# Patient Record
Sex: MALE | Race: WHITE | HISPANIC OR LATINO | Employment: OTHER | URBAN - METROPOLITAN AREA
[De-identification: names, ages, dates, MRNs, and addresses within clinical notes are randomized per-mention and may not be internally consistent; named-entity substitution may affect disease eponyms.]

---

## 2017-03-29 ENCOUNTER — CLINICAL SUPPORT (OUTPATIENT)
Dept: REHABILITATION | Facility: HOSPITAL | Age: 62
End: 2017-03-29
Attending: ORTHOPAEDIC SURGERY
Payer: MEDICARE

## 2017-03-29 DIAGNOSIS — R29.898 DECREASED ROM OF NECK: ICD-10-CM

## 2017-03-29 DIAGNOSIS — M62.89 QUADRICEP TIGHTNESS: ICD-10-CM

## 2017-03-29 DIAGNOSIS — M25.69 DECREASED ROM OF TRUNK AND BACK: ICD-10-CM

## 2017-03-29 DIAGNOSIS — M62.9 HAMSTRING TIGHTNESS OF BOTH LOWER EXTREMITIES: ICD-10-CM

## 2017-03-29 PROCEDURE — 97110 THERAPEUTIC EXERCISES: CPT | Mod: PN

## 2017-03-29 PROCEDURE — 97161 PT EVAL LOW COMPLEX 20 MIN: CPT | Mod: PN

## 2017-03-29 PROCEDURE — G8982 BODY POS GOAL STATUS: HCPCS | Mod: CJ,PN

## 2017-03-29 PROCEDURE — G8981 BODY POS CURRENT STATUS: HCPCS | Mod: CK,PN

## 2017-03-29 NOTE — PLAN OF CARE
TIME RECORD    Date: 03/29/2017    Start Time:  10:00  Stop Time:  10:52    PROCEDURES:    TIMED  Procedure Min.   TE 8                     UNTIMED  Procedure Min.   EVL 43         Total Timed Minutes:  8  Total Timed Units:  1  Total Untimed Units:  1  Charges Billed/# of units:  EVL-1, TE-1    OUTPATIENT PHYSICAL THERAPY   PATIENT EVALUATION  Onset Date: chronic  Primary Diagnosis:   1. Decreased ROM of neck     2. Decreased ROM of trunk and back     3. Quadricep tightness     4. Hamstring tightness of both lower extremities       Treatment Diagnosis: cervical and lumbar pain, B shoulder impingement  No past medical history on file. No Pmhx to report.  Precautions: standard  Prior Therapy: unsure.  Medications: Medhat Nunez has a current medication list which includes the following prescription(s): multivitamin.  History of Present Illness: cervical, lumbar, and shoulder pain.  Prior Level of Function: Independent  Social History: Lives in [facility]. Not employed.  Place of Residence (Steps/Adaptations): 6 steps to enter  Functional Deficits Leading to Referral/Nature of Injury: pain  Patient Therapy Goals: exercise, decrease pain    Subjective     Medhat Nunez states cervical fusion 2013 and R RCR  2014 (no lumbar sx). States pain in constant in B shoulders, cervical, and lumbar spine. Lumbar pain began 6 months ago, and cervical pain since 2013. States he has been exercising all this life, but now he is unable. Pain not dependent on time of day and denies N/T B UE/LE.     Pain:  Location: cervical <> lumbar spine, B shoulders  (B UT)  Description: constant  Activities Which Increase Pain: ambulation, sit <> stand, turning c driving  Activities Which Decrease Pain: ice, pain meds  Pain Scale: 3/10 at best 8/10 now  9/10 at worst    Objective     Posture: B rounded shoulders, R shoulder elevated  Palpation: TTP c CPA's T10-L4/5; TTP R thoracic and lumbar paraspinals, B QL c increased myofascial tone throughout.    Sensation: SILT B UE/LE  DTRs: NT  Range of Motion/Strength:     AROM: CERVICAL LUMBAR   Flexion 40 70 c pain   Extension 20 c pain 15 c pain   Right side bending 30 8 c pain   Left side bending 30 10 c pain   Right rotation 46 c pain 25% WNL   Left rotation 33 c pain 25% WNL     Shoulder  Right   Left  Pain/Dysfunction with Movement    AROM PROM MMT AROM PROM MMT    flexion WFL NT 4/5 WFL NT 4/5    abduction WFL NT 4-/5 WFL NT 4/5    Internal rotation WFL NT 5/5 WFL NT 5/5    ER at 0° abd WFL NT 4/5 WFL NT 4/5      Hip  Right   Left  Pain/Dysfunction with Movement    AROM PROM MMT AROM PROM MMT    Flexion * WFL 3/5 * WFL 3+/5 *limited by quad pain   Extension * NT 3/5 * NT 3/5 * AROM and MMT limited by muscle tightness   Abduction WFL WFL 4+/5 WFL WFL 4+/5 Quad pain c MMT B   Internal rotation WFL WFL 5/5 WFL WFL 5/5    External rotation WFL WFL 4+/5 WFL WFL 4+/5       Knee  Right   Left  Pain/Dysfunction with Movement    AROM PROM MMT AROM PROM MMT    Flexion WFL WFL 5/5 WFL WFL 5/5    Extension WFL WFL 5/5 WFL WFL 5/5      Ankle Right  Left  Pain/Dysfunction with Movement    AROM MMT AROM MMT    Plantarflexion WFL 5/5 WFL 5/5    Dorsiflexion WFL 5/5 WFL 5/5    Inversion WFL 5/5 WFL 5/5    Eversion WFL 5/5 WFL 5/5            Flexibility: Active SLR: R 10 L 20 (limited by B quad pain, - for LBP)           HS 90/90: R 25  L 20  Gait: Without AD  Analysis: Ambulates c R shoulder elevation, slight L lateral trunk lean, and decreased trunk rotation   Bed Mobility:Independent  Transfers: Independent  Special Tests:  Neer's: + B      Hawkin's Fredy: + L      Vert aa test: ODALIS due to pain/ cervical fusion      Ely's test: + B (rectus femoris tightness)  Other: na  Treatment: Issued HEP for LTR, HS stretches, and pec doorway stretch.     Assessment       Initial Assessment (Pertinent finding, problem list and factors affecting outcome): Pt is a 61 y/o M referred to PT for cervicalgia, B shoulder impingement, and LBP.  Reports h/o cervical fusion.Demonstrates decreased lumbar/cervical ROM, decreased hamstring and quad muscle length B, B hip weakness, and myofascial tightness. Pt will benefit from skilled PT to meet the above mentioned functional deficits and address the goals listed under plan.     History  Co-morbidities and personal factors that may impact the plan of care Examination  Body Structures and Functions, activity limitations and participation restrictions that may impact the plan of care Clinical Presentation   Decision Making/ Complexity Score   Co-morbidities:  none                Personal Factors: cervical fusion in 2013       Body Regions: cervical <> lumbar spine, B UE/LE    Body Systems: neuromuscular, musculoskeletal, gait/ mobility          Activity limitations: ambulation, bending, stairs/steps, carrying/lifting      Participation Restrictions: housework, stair negotiation, lifting heavy boxes         Stable c unchanging clinical characteristics FOTO lumbar spine: 58% limitation   [id number] CK  CJ    FOTO cervical and shoulders: TBA    *low complexity*               Rehab Potiential: good  Barriers to Rehab: none  Short Term Goals (4  Weeks): =  Long Term Goals (8 Weeks):   1. Pt will report current pain </= 6/10 to improve quality of sleep.  2. Pt. Will report FOTO lumbar spine </= 20-40% limitation to increase ease of carrying/lifting at home during household chores.  3. Pt will demonstrate >/= 4+/5 B shoulder MMT in above tested areas to increase ease of carrying/lifting during household chores.   4. Pt will demonstrate B ASLR >/= 20 degrees to improve tolerance to stair negotiation.   5. Pt will be I c HEP to promote wellness at home.    Plan     Certification Period: 3/29/17 to 5/29/17  Recommended Treatment Plan: 2 times per week for 6-8 weeks: Electrical Stimulation IFC/pre mod, Manual Therapy, Moist Heat/ Ice, Neuromuscular Re-ed, Patient Education, Self Care, Therapeutic Activites, Therapeutic  Exercise and Ultrasound/Phonophoresis  Other Recommendations: modalities prn, ASTYM prn, kinesiotape prn, Functional Dry Needling prn       Therapist: Minnie Ware PT    I CERTIFY THE NEED FOR THESE SERVICES FURNISHED UNDER THIS PLAN OF TREATMENT AND WHILE UNDER MY CARE    Physician's comments: ________________________________________________________________________________________________________________________________________________      Physician's Name: ___________________________________    I certify the need for these services furnished under this plan of treatment and while under my care.  Marcellus Zavala MD

## 2017-03-31 ENCOUNTER — CLINICAL SUPPORT (OUTPATIENT)
Dept: REHABILITATION | Facility: HOSPITAL | Age: 62
End: 2017-03-31
Attending: ORTHOPAEDIC SURGERY
Payer: MEDICARE

## 2017-03-31 DIAGNOSIS — R29.898 DECREASED ROM OF NECK: ICD-10-CM

## 2017-03-31 DIAGNOSIS — M62.9 HAMSTRING TIGHTNESS OF BOTH LOWER EXTREMITIES: ICD-10-CM

## 2017-03-31 DIAGNOSIS — M25.69 DECREASED ROM OF TRUNK AND BACK: ICD-10-CM

## 2017-03-31 DIAGNOSIS — M62.89 QUADRICEP TIGHTNESS: ICD-10-CM

## 2017-03-31 PROCEDURE — 97140 MANUAL THERAPY 1/> REGIONS: CPT | Mod: PN

## 2017-03-31 PROCEDURE — 97110 THERAPEUTIC EXERCISES: CPT | Mod: PN

## 2017-03-31 NOTE — PROGRESS NOTES
"TIME RECORD    Date:  03/31/2017    Start Time:  1100  Stop Time:  1200    PROCEDURES:    TIMED  Procedure Min.   MT 35   TE 15                 UNTIMED  Procedure Min.   Moist heat 10         Total Timed Minutes:  50  Total Timed Units:  3  Total Untimed Units:  0  Charges Billed/# of units:  2 MT, 1 TE      Progress/Current Status    Subjective:     Patient ID: Medhat Nunez is a 62 y.o. male.  Diagnosis:   1. Decreased ROM of neck     2. Decreased ROM of trunk and back     3. Quadricep tightness     4. Hamstring tightness of both lower extremities       Pain: Patient reports having pain all over.       Patien received moist heat to c-spine and full back x 10 minutes. Patient then received manual therapy consisting of STM to B cervical paraspinals with elongation, gentle suboccipital release, STM/MFR/stretch to B UT's then into alternating sidelying for STM/MFR to B QL's with stretching and STM/MFR to B lumbar paraspinals x 35 minutes.  Patient then instructed in and performed therex as outlined per log x 15 minutes.     Date 3/31/17   FOTO 2/5   Visit   5/9/17 2 of 12   POC 5/29/17   G Code 2 of 10   Cap Visit  Cap total   91.70  167.30   MHP 10'   MT 35'   UT Str. MT   Pec Str. Over half roll 3'    Snow [place] on roll x10   Chin Tuck 5"x10   LTR 5"x10   TA 5"x10   DNF --   Cervical Agusto 5"x5   Rot/sb   Scapular Ret 5"x10   Lats -   Rows -   Horizontal Abd -   Scaption -   Wall Slides -   Initials CS 1/6       Assessment:     Patient required tactile and verbal cues for TA activation.    Patient Education/Response:     Continue with current HEP.    Plans and Goals:   Short Term Goals (4  Weeks): = Long Term Goals (8 Weeks):   1. Pt will report current pain </= 6/10 to improve quality of sleep.  2. Pt. Will report FOTO lumbar spine </= 20-40% limitation to increase ease of carrying/lifting at home during household chores.  3. Pt will demonstrate >/= 4+/5 B shoulder MMT in above tested areas to increase ease of " carrying/lifting during household chores.   4. Pt will demonstrate B ASLR >/= 20 degrees to improve tolerance to stair negotiation.   5. Pt will be I c HEP to promote wellness at home.

## 2017-04-05 ENCOUNTER — CLINICAL SUPPORT (OUTPATIENT)
Dept: REHABILITATION | Facility: HOSPITAL | Age: 62
End: 2017-04-05
Attending: ORTHOPAEDIC SURGERY
Payer: MEDICARE

## 2017-04-05 DIAGNOSIS — R29.898 DECREASED ROM OF NECK: ICD-10-CM

## 2017-04-05 DIAGNOSIS — M62.89 QUADRICEP TIGHTNESS: ICD-10-CM

## 2017-04-05 DIAGNOSIS — M25.69 DECREASED ROM OF TRUNK AND BACK: ICD-10-CM

## 2017-04-05 DIAGNOSIS — M62.9 HAMSTRING TIGHTNESS OF BOTH LOWER EXTREMITIES: ICD-10-CM

## 2017-04-05 PROCEDURE — 97140 MANUAL THERAPY 1/> REGIONS: CPT | Mod: PN

## 2017-04-05 PROCEDURE — 97110 THERAPEUTIC EXERCISES: CPT | Mod: PN

## 2017-04-05 NOTE — PROGRESS NOTES
"TIME RECORD    Date:  04/05/2017    Start Time:  9:00  Stop Time:  10:02    PROCEDURES:    TIMED  Procedure Min.   MT  20'   Te 1:1 23   TE supervised 6 nc             UNTIMED  Procedure Min.    10         Total Timed Minutes:  43  Total Timed Units:  3  Total Untimed Units:  0  Charges Billed/# of units:  MT-1, Te-2      Progress/Current Status    Subjective:     Patient ID: Medhat Nunez is a 62 y.o. male.  Diagnosis:   1. Decreased ROM of neck     2. Decreased ROM of trunk and back     3. Quadricep tightness     4. Hamstring tightness of both lower extremities       Pain: 6 /10 States R lumbar pain > L. Reports he purchased a foam roller for HEP. Reports he performs HEP 2-3 x a day.     Objective:     MT x 20' consisting of  L s/l for R QL/ lumbar paraspinal STM + B hamstring and hip ADD stretching in supine. F/b TE 1:1 c PT x 23' f/b 6' L 5 on sci fit stepper supervised by PT tech. Ended c MH to thoracic and lumbar spine in supine x 10'    Date 4/5/17 3/31/17   FOTO 3/5 2/5   Visit   5/9/17 3/12 2 of 12   POC 5/29/17 5/29/17   G Code 3/10 2 of 10   Cap Visit  Cap total 93.82  261.12     91.70  167.30   MHP 10' 10'   MT 20' 35'   UT Str. 30" x 3 B - self MT   Pec Str. 3' full roll Over half roll 3'    Snow [place] on roll 20 x r x10   Chin Tuck 5" x 10 5"x10   LTR 10" x 5 B 5"x10   TA 5" x 12 5"x10   DNF - --   Cervical Agusto - 5"x5   Rot/sb   pec stretch in door 3 x 30"     Scapular Ret 5" x 12 5"x10   Lats  -   Rows  -   Horizontal Abd  -   Scaption  -   Wall Slides  -   Initials ED CS 1/6     Assessment:     Reported "feeling better" p rx. Required VC to decreased anterior pelvic tilt c seated scapular retraction.     Patient Education/Response:     Continue c HEP.      Plans and Goals:     Continue c POC. Progress HEP next rx.     Short Term Goals (4  Weeks): 4/29/17 = Long Term Goals (8 Weeks): 5/29/17  1. Pt will report current pain </= 6/10 to improve quality of sleep.  2. Pt. Will report FOTO lumbar " spine </= 20-40% limitation to increase ease of carrying/lifting at home during household chores.  3. Pt will demonstrate >/= 4+/5 B shoulder MMT in above tested areas to increase ease of carrying/lifting during household chores.   4. Pt will demonstrate B ASLR >/= 20 degrees to improve tolerance to stair negotiation.   5. Pt will be I c HEP to promote wellness at home.

## 2017-04-07 ENCOUNTER — CLINICAL SUPPORT (OUTPATIENT)
Dept: REHABILITATION | Facility: HOSPITAL | Age: 62
End: 2017-04-07
Attending: ORTHOPAEDIC SURGERY
Payer: MEDICARE

## 2017-04-07 DIAGNOSIS — M25.69 DECREASED ROM OF TRUNK AND BACK: ICD-10-CM

## 2017-04-07 DIAGNOSIS — M62.9 HAMSTRING TIGHTNESS OF BOTH LOWER EXTREMITIES: ICD-10-CM

## 2017-04-07 DIAGNOSIS — R29.898 DECREASED ROM OF NECK: ICD-10-CM

## 2017-04-07 DIAGNOSIS — M62.89 QUADRICEP TIGHTNESS: ICD-10-CM

## 2017-04-07 PROCEDURE — 97110 THERAPEUTIC EXERCISES: CPT | Mod: PN

## 2017-04-07 PROCEDURE — 97140 MANUAL THERAPY 1/> REGIONS: CPT | Mod: PN

## 2017-04-07 NOTE — PROGRESS NOTES
"TIME RECORD    Date:  04/07/2017    Start Time:  8:52  Stop Time:  9:55    PROCEDURES:    TIMED  Procedure Min.   bike 5 nc   MT 25   Te 1:1 23             UNTIMED  Procedure Min.   MH 10         Total Timed Minutes:  48  Total Timed Units:  4  Total Untimed Units:  0  Charges Billed/# of units:  MT-2, TE-2      Progress/Current Status    Subjective:     Patient ID: Medhat Nunez is a 62 y.o. male.  Diagnosis:   1. Decreased ROM of neck     2. Decreased ROM of trunk and back     3. Quadricep tightness     4. Hamstring tightness of both lower extremities       Pain: 5 /10 LBP and posterior cervical pain. States he feels better p performing HEP (from initial evaluation).      Objective:     Warm up x 5' on sci fit stepper L 5. F/b MT x 25' consisting of TRP release to B QL/glute med/min in sidelying each side + supine TRP release to suboccipitals, B SCMs, and myofascial release B UT. F/b Te 1:1 c PT per log below.  VC/TC for form of scapular retraction. Ended c MH to cervical and lumbar spine x 10' in supine.    *AUTH 12 visits 5/9/17*  Date 4/7/17 4/5/17 3/31/17   FOTO 4/5 3/5 2/5   Visit   5/9/17 4/12 3/12 2 of 12   POC 5/29/17 5/29/17 5/29/17   G Code 4/10 3/10 2 of 10   Cap Visit  Cap total 123.68  384.80 93.82  261.12     91.70  167.30   MHP  10' 10'   MT 25 20' 35'   UT Str. Self 30" x 2 B 30" x 3 B - self MT   Pec Str. 3' foam roll 3' full roll Over half roll 3'    Snow [place] on roll 20 x 20 x  x10   Open book AROM 12 B     Chin Tuck - 5" x 10 5"x10   LTR 10" x 5 B 10" x 5 B 5"x10   HS 90/90 flossing 12 B     TA 5" x 12 5" x 12 5"x10   DNF  - --   Cervical Agusto - - 5"x5   Rot/sb   pec stretch in door 3 x 30" 3 x 30"     Scapular Ret 5" x 10 5" x 12 5"x10   Lats   -   Rows   -   Horizontal Abd   -   Scaption   -   Wall Slides   -   Initials ED ED CS 1/6     Assessment:     Increased myofascial tone noted in R QL/glute med/min and Posterior C spine/B UT. Reported feeling "better" p interventions.    Patient " Education/Response:     Added foam roll pec stretch, scapular retraction, open book AROM, and HS 90/90 flossing to HEP. Pt demonstrated and verbalized understanding.       Plans and Goals:     Continue c POC. Progress HEP next rx.     Short Term Goals (4  Weeks): 4/29/17 = Long Term Goals (8 Weeks): 5/29/17  1. Pt will report current pain </= 6/10 to improve quality of sleep.  2. Pt. Will report FOTO lumbar spine </= 20-40% limitation to increase ease of carrying/lifting at home during household chores.  3. Pt will demonstrate >/= 4+/5 B shoulder MMT in above tested areas to increase ease of carrying/lifting during household chores.   4. Pt will demonstrate B ASLR >/= 20 degrees to improve tolerance to stair negotiation.   5. Pt will be I c HEP to promote wellness at home.

## 2017-04-10 ENCOUNTER — CLINICAL SUPPORT (OUTPATIENT)
Dept: REHABILITATION | Facility: HOSPITAL | Age: 62
End: 2017-04-10
Attending: ORTHOPAEDIC SURGERY
Payer: MEDICARE

## 2017-04-10 DIAGNOSIS — M62.9 HAMSTRING TIGHTNESS OF BOTH LOWER EXTREMITIES: ICD-10-CM

## 2017-04-10 DIAGNOSIS — R29.898 DECREASED ROM OF NECK: ICD-10-CM

## 2017-04-10 DIAGNOSIS — M62.89 QUADRICEP TIGHTNESS: ICD-10-CM

## 2017-04-10 DIAGNOSIS — M25.69 DECREASED ROM OF TRUNK AND BACK: ICD-10-CM

## 2017-04-10 PROCEDURE — 97110 THERAPEUTIC EXERCISES: CPT | Mod: PN

## 2017-04-10 PROCEDURE — 97140 MANUAL THERAPY 1/> REGIONS: CPT | Mod: PN

## 2017-04-10 NOTE — PROGRESS NOTES
"TIME RECORD    Date:  04/10/2017    Start Time:  9:55  Stop Time:  11:01    PROCEDURES:    TIMED  Procedure Min.   MT 17   TE  35                 UNTIMED  Procedure Min.   MH 10         Total Timed Minutes:  52  Total Timed Units:  3  Total Untimed Units:  0  Charges Billed/# of units:  MT-1, TE-2      Progress/Current Status    Subjective:     Patient ID: Medhat Nunez is a 62 y.o. male.  Diagnosis:   1. Decreased ROM of neck     2. Decreased ROM of trunk and back     3. Quadricep tightness     4. Hamstring tightness of both lower extremities       Pain: " I feel better, but still have pain" c/o B LBP this AM, but no # given. Reports that when he has pain in neck,shoulders, or back that the pain does not "stay as long" as before he started PT.     Objective:     MT x 17' in sidelying and alternate sidelying for STM lumbar and thoracic paraspinals, B QL + supine suboccipital and B scalene TRP release. F/b TE 1:1 c PT per log below. VC/TC to decrease ant pelvic tilt c scapular retraction in sitting. VC for log rolling vs multi segmental flexion c bed mobility. FOTO lumbar spine = 54%  Limitation. Ended c MH in supine to cervical and lumbar spine x 10'.    *AUTH 12 visits 5/9/17*  Date 4/10/17 4/7/17 4/5/17 3/31/17   FOTO issued 4/5 3/5 2/5   auth Visit   5/9/17 5/12 4/12 3/12 2 of 12   POC 5/29/17 5/29/17 5/29/17 5/29/17   G Code 5/10 4/10 3/10 2 of 10   Cap Visit  Cap total 93.82  478.62 123.68  384.80 93.82  261.12     91.70  167.30   MHP 10' 10' 10' 10'   MT 17' 25 20' 35'   UT Str. NT Self 30" x 2 B 30" x 3 B - self MT   Pec Str. 3'  3' foam roll 3' full roll Over half roll 3'    Snow [place] on roll 20 x 20 x 20 x  x10   Open book AROM 12 B 12 B     Chin Tuck held - 5" x 10 5"x10   EOM hip flexor stretch 1' B      LTR 10" x 5 B 10" x 5 B 10" x 5 B 5"x10   Hip ADD str 2 x 30" B      HS 90/90 flossing 15 B 12 B     TA 5" x 12 5" x 12 5" x 12 5"x10   DNF -  - --   Cervical Agusto held - - 5"x5   Rot/sb   pec stretch " "in door nt 3 x 30" 3 x 30"     Scapular Ret 5" x 10 5" x 10 5" x 12 5"x10   Lats    -   Rows    -   Horizontal Abd    -   Scaption    -   Wall Slides    -   Initials ED ED ED CS 1/6     Assessment:     Improved FOTO lumbar score limitation from 58% at initial evaluation to 54% limitation currently.     Patient Education/Response:     Continue c HEP.      Plans and Goals:     Continue c POC.     Short Term Goals (4  Weeks): 4/29/17 = Long Term Goals (8 Weeks): 5/29/17  1. Pt will report current pain </= 6/10 to improve quality of sleep.  2. Pt. Will report FOTO lumbar spine </= 20-40% limitation to increase ease of carrying/lifting at home during household chores.  3. Pt will demonstrate >/= 4+/5 B shoulder MMT in above tested areas to increase ease of carrying/lifting during household chores.   4. Pt will demonstrate B ASLR >/= 20 degrees to improve tolerance to stair negotiation.   5. Pt will be I c HEP to promote wellness at home.    "

## 2017-04-12 ENCOUNTER — CLINICAL SUPPORT (OUTPATIENT)
Dept: REHABILITATION | Facility: HOSPITAL | Age: 62
End: 2017-04-12
Attending: ORTHOPAEDIC SURGERY
Payer: MEDICARE

## 2017-04-12 DIAGNOSIS — M62.89 QUADRICEP TIGHTNESS: ICD-10-CM

## 2017-04-12 DIAGNOSIS — M25.69 DECREASED ROM OF TRUNK AND BACK: ICD-10-CM

## 2017-04-12 DIAGNOSIS — R29.898 DECREASED ROM OF NECK: ICD-10-CM

## 2017-04-12 DIAGNOSIS — M62.9 HAMSTRING TIGHTNESS OF BOTH LOWER EXTREMITIES: ICD-10-CM

## 2017-04-12 PROCEDURE — 97110 THERAPEUTIC EXERCISES: CPT | Mod: PN

## 2017-04-12 PROCEDURE — 97140 MANUAL THERAPY 1/> REGIONS: CPT | Mod: PN

## 2017-04-12 NOTE — PROGRESS NOTES
"TIME RECORD    Date:  04/12/2017    Start Time:  9:00  Stop Time:  10:05    PROCEDURES:    TIMED  Procedure Min.   bike 5' nc   MT 15   TE 1:1  45             UNTIMED  Procedure Min.             Total Timed Minutes:  60  Total Timed Units:  4  Total Untimed Units:  0  Charges Billed/# of units:  MT-1, TE -3      Progress/Current Status    Subjective:     Patient ID: Medhat Nunez is a 62 y.o. male.  Diagnosis:   1. Decreased ROM of neck     2. Decreased ROM of trunk and back     3. Quadricep tightness     4. Hamstring tightness of both lower extremities       Pain: 8/10 R lateral trunk/ lumbar spine     Objective:     Warm up on bike x 5' L 5.0 f/b MT x 15' in L s/l c cupping for myofascial decompression to R QL and thoracic paraspinals + Kinesiotaping in sitting (c L lateral trunk flexion + R shoulder flexion 180 degrees) c 2 - vertical strips applied c tape off tension to R QL at lateral border + 2 horizontal strips c 100% tension ( taping performed c pt in stretch as mentioned above ). F/b TE 1:1 c PT per log below. Ended c Mh to cervical and lumbar spine x 10' p.     *AUTH 12 visits 5/9/17*  Date 4/12/17 4/10/17 4/7/17 4/5/17 3/31/17   FOTO 6/10 issued 4/5 3/5 2/5   auth Visit   5/9/17 6/12 5/12 4/12 3/12 2 of 12   POC 5/29/17 5/29/17 5/29/17 5/29/17 5/29/17   G Code  5/10 4/10 3/10 2 of 10   Cap Visit  Cap total  93.82  478.62 123.68  384.80 93.82  261.12     91.70  167.30   MHP  10' 10' 10' 10'   MT 15'  17' 25 20' 35'   UT Str.  NT Self 30" x 2 B 30" x 3 B - self MT   Pec Str. 3' 3'  3' foam roll 3' full roll Over half roll 3'    Snow [place] on roll 25 x  20 x 20 x 20 x  x10   Open book AROM 12 B  12 B 12 B     Chin Tuck -  held - 5" x 10 5"x10   EOM hip flexor stretch 1' B  1' B      LTR 10" x 5 B  10" x 5 B 10" x 5 B 10" x 5 B 5"x10   Hip ADD str 1' B  2 x 30" B      HS 90/90 flossing 15 B  15 B 12 B     TA 5" x 15 5" x 12 5" x 12 5" x 12 5"x10   DNF  -  - --   Cervical Agusto - held - - 5"x5   Rot/sb   pec " "stretch in door  nt 3 x 30" 3 x 30"     Scapular Ret 5"x 12 5" x 10 5" x 10 5" x 12 5"x10   Lats     -   Rows     -   Horizontal Abd     -   Scaption     -   Wall Slides     -   Initials ED ED ED ED CS 1/6       Assessment:     Pt. Reported "I feel a little better" p therapy interventions. Educated and verbalized understanding regarding wear use of K tape.     Patient Education/Response:     Continue c HEP.      Plans and Goals:     Continue c POC.     Short Term Goals (4  Weeks): 4/29/17 = Long Term Goals (8 Weeks): 5/29/17  1. Pt will report current pain </= 6/10 to improve quality of sleep.  2. Pt. Will report FOTO lumbar spine </= 20-40% limitation to increase ease of carrying/lifting at home during household chores.  3. Pt will demonstrate >/= 4+/5 B shoulder MMT in above tested areas to increase ease of carrying/lifting during household chores.   4. Pt will demonstrate B ASLR >/= 20 degrees to improve tolerance to stair negotiation.   5. Pt will be I c HEP to promote wellness at home.  "

## 2017-04-19 ENCOUNTER — CLINICAL SUPPORT (OUTPATIENT)
Dept: REHABILITATION | Facility: HOSPITAL | Age: 62
End: 2017-04-19
Attending: ORTHOPAEDIC SURGERY
Payer: MEDICARE

## 2017-04-19 DIAGNOSIS — M62.9 HAMSTRING TIGHTNESS OF BOTH LOWER EXTREMITIES: ICD-10-CM

## 2017-04-19 DIAGNOSIS — M62.89 QUADRICEP TIGHTNESS: ICD-10-CM

## 2017-04-19 DIAGNOSIS — M25.69 DECREASED ROM OF TRUNK AND BACK: ICD-10-CM

## 2017-04-19 DIAGNOSIS — R29.898 DECREASED ROM OF NECK: ICD-10-CM

## 2017-04-19 PROCEDURE — 97140 MANUAL THERAPY 1/> REGIONS: CPT | Mod: PN

## 2017-04-19 PROCEDURE — 97110 THERAPEUTIC EXERCISES: CPT | Mod: PN

## 2017-04-19 NOTE — PROGRESS NOTES
"TIME RECORD    Date:  04/19/2017    Start Time:  11:05 am   Stop Time:  11:59 am     PROCEDURES:    TIMED  Procedure Min.   TE supervised 14' NC   TE 28'   MT 12'             UNTIMED  Procedure Min.             Total Timed Minutes:  40'  Total Timed Units:  3  Total Untimed Units:  0  Charges Billed/# of units:  3 ( 2 TE, 1 MT)       Progress/Current Status    Subjective:     Patient ID: Medhat Nunez is a 62 y.o. male.  Diagnosis:   1. Decreased ROM of neck     2. Decreased ROM of trunk and back     3. Quadricep tightness     4. Hamstring tightness of both lower extremities       Pain: 7 /10  Pt c/o experiencing 7/10 pain in (R) lumbar region prior to therapy session. Pt stated that this past Friday he went to primary care MD was having increased pain so was sent to ER. Pt stated that he received and injection in (B) lumbar region and was given pain medicine at ER.     Objective:     Warm up on bike x 5' supervised by PT. Pt participated in therex per log below supervised by PT x 9' and 1:1 with PT x 28'. Pt received manual therapy consisting of pt in (L) SL for STM to (R) lumbar and thoracic paraspinal muscles x 12'.   *AUTH 12 visits 5/9/17*  Date 4/19/17 4/12/17 4/10/17 4/7/17 4/5/17 3/31/17   FOTO 7/10 6/10 issued 4/5 3/5 2/5   auth Visit   5/9/17 7/12 6/12 5/12 4/12 3/12 2 of 12   POC 5/29/17 5/29/17 5/29/17 5/29/17 5/29/17 5/29/17   G Code 7/10  5/10 4/10 3/10 2 of 10   Cap Visit  Cap total 93.82  698.24 125.80  604.42 93.82  478.62 123.68  384.80 93.82  261.12     91.70  167.30   MHP -  10' 10' 10' 10'   MT 12' 15'  17' 25 20' 35'   UT Str. 30"x3 B  NT Self 30" x 2 B 30" x 3 B - self MT   Pec Str. 3' on foam roll 3' 3'  3' foam roll 3' full roll Over half roll 3'    [place] on roll held 25 x  20 x 20 x 20 x  x10   [place] on roll x10        Open book AROM x15 B 12 B  12 B 12 B     Chin Tuck - -  held - 5" x 10 5"x10   EOM hip flexor stretch 1' 1' B  1' B      LTR 10"x5 B 10" x 5 B  10" x 5 B 10" x 5 B " "10" x 5 B 5"x10   Hip ADD str standing 2x30" B 1' B  2 x 30" B      HS 90/90 flossing - 15 B  15 B 12 B     TA 5"x15 5" x 15 5" x 12 5" x 12 5" x 12 5"x10   DNF -  -  - --   Cervical Agusto - - held - - 5"x5   Rot/sb   pec stretch in door -  nt 3 x 30" 3 x 30"     Scapular Ret 5"x12 5"x 12 5" x 10 5" x 10 5" x 12 5"x10   Lats -     -   Rows -     -   Horizontal Abd -     -   Scaption -     -   Wall Slides -     -   Initials CK ED ED ED ED CS 1/6       Assessment:     Pt was able to tolerate therapy session without any c/o increased pain after therapy session. Pt required VC from therapist to perform stretches within pain free range.     Patient Education/Response:     Continue with HEP     Plans and Goals:     Continue c POC.     Short Term Goals (4  Weeks): 4/29/17 = Long Term Goals (8 Weeks): 5/29/17  1. Pt will report current pain </= 6/10 to improve quality of sleep.  2. Pt. Will report FOTO lumbar spine </= 20-40% limitation to increase ease of carrying/lifting at home during household chores.  3. Pt will demonstrate >/= 4+/5 B shoulder MMT in above tested areas to increase ease of carrying/lifting during household chores.   4. Pt will demonstrate B ASLR >/= 20 degrees to improve tolerance to stair negotiation.   5. Pt will be I c HEP to promote wellness at home.    "

## 2017-04-21 ENCOUNTER — CLINICAL SUPPORT (OUTPATIENT)
Dept: REHABILITATION | Facility: HOSPITAL | Age: 62
End: 2017-04-21
Attending: ORTHOPAEDIC SURGERY
Payer: MEDICARE

## 2017-04-21 DIAGNOSIS — M25.69 DECREASED ROM OF TRUNK AND BACK: ICD-10-CM

## 2017-04-21 DIAGNOSIS — M62.89 QUADRICEP TIGHTNESS: ICD-10-CM

## 2017-04-21 DIAGNOSIS — R29.898 DECREASED ROM OF NECK: ICD-10-CM

## 2017-04-21 DIAGNOSIS — M62.9 HAMSTRING TIGHTNESS OF BOTH LOWER EXTREMITIES: ICD-10-CM

## 2017-04-21 PROCEDURE — 97140 MANUAL THERAPY 1/> REGIONS: CPT | Mod: PN

## 2017-04-21 PROCEDURE — 97110 THERAPEUTIC EXERCISES: CPT | Mod: PN

## 2017-04-21 NOTE — PROGRESS NOTES
"TIME RECORD    Date:  04/21/2017    Start Time:  11:00  Stop Time:  12:00     PROCEDURES:    TIMED  Procedure Min.   TE supervised 5' NC   TE 40'   MT 15'             UNTIMED  Procedure Min.             Total Timed Minutes:  55'  Total Timed Units:  4  Total Untimed Units:  0  Charges Billed/# of units:  4 ( 3 TE, 1 MT)       Progress/Current Status    Subjective:     Patient ID: Medhat Nunez is a 62 y.o. male.  Diagnosis:   1. Decreased ROM of neck     2. Decreased ROM of trunk and back     3. Quadricep tightness     4. Hamstring tightness of both lower extremities       Pain: 5-6 /10  Pt c/o experiencing 5-6/10 pain in R lower back.  Also reports neck pain.  Described pain as pulling/tightness.  Would like to do more exercises for lower back today.    Objective:     Warm up on bike x 5' supervised by PT. Pt participated in therex per log below 1:1 with PT x 40'. Pt received manual therapy consisting of pt in (L) SL for STM to (R) lumbar and thoracic paraspinal muscles and then in supine for STM/MFR to B cervical paraspinals x 15'.   *AUTH 12 visits 5/9/17*  Date 4/21/17 4/19/17 4/12/17 4/10/17 4/7/17 4/5/17 3/31/17   FOTO 8/10 7/10 6/10 issued 4/5 3/5 2/5   auth Visit   5/9/17 8/12 7/12 6/12 5/12 4/12 3/12 2 of 12   POC 5/29/17 5/29/17 5/29/17 5/29/17 5/29/17 5/29/17 5/29/17   G Code 8/10 7/10  5/10 4/10 3/10 2 of 10   Cap Visit  Cap total 125.80  824.04 93.82  698.24 125.80  604.42 93.82  478.62 123.68  384.80 93.82  261.12     91.70  167.30   MHP  -  10' 10' 10' 10'   MT 15' 12' 15'  17' 25 20' 35'   UT Str. NT 30"x3 B  NT Self 30" x 2 B 30" x 3 B - self MT   Pec Str. NT 3' on foam roll 3' 3'  3' foam roll 3' full roll Over half roll 3'    Snow [place] on roll NT held 25 x  20 x 20 x 20 x  x10   Flies on roll NT x10        Open book AROM NT x15 B 12 B  12 B 12 B     Chin Tuck  - -  held - 5" x 10 5"x10   EOM hip flexor stretch 1' B 1' 1' B  1' B      LTR 10x5" B 10"x5 B 10" x 5 B  10" x 5 B 10" x 5 B 10" x 5 B " "5"x10   Hip ADD str 2x30" supine standing 2x30" B 1' B  2 x 30" B      HS 90/90 flossing 20 B - 15 B  15 B 12 B     Seated lumbar flex/rot stretch 3' w/SB         TA 5" x 20 5"x15 5" x 15 5" x 12 5" x 12 5" x 12 5"x10   TA w/ march 2 x 10         DNF  -  -  - --   Cervical Agusto  - - held - - 5"x5   Rot/sb   pec stretch in door  -  nt 3 x 30" 3 x 30"     Scapular Ret NT 5"x12 5"x 12 5" x 10 5" x 10 5" x 12 5"x10   Lats 2x10 rtb -     -   Rows 2x10 rtb -     -   Horizontal Abd  -     -   Scaption  -     -   Wall Slides  -     -   Initials RS CK ED ED ED ED CS 1/6       Assessment:     Pt tolerated treatment well with addition of lumbar flex/rot stretch and core exercises.  After lumbar flexion/rotation stretch, pt reported decreased tightness and improvement in pain.  Held on most neck exercises today secondary to pt's request to work more on lower back.  Significant tightness present in R UT.  After STM to B cervical paraspinals and B UT, decreased c/o pain and improved cervical ROM noted.      Patient Education/Response:     Continue with HEP     Plans and Goals:     Continue c POC.     Short Term Goals (4  Weeks): 4/29/17 = Long Term Goals (8 Weeks): 5/29/17  1. Pt will report current pain </= 6/10 to improve quality of sleep.  2. Pt. Will report FOTO lumbar spine </= 20-40% limitation to increase ease of carrying/lifting at home during household chores.  3. Pt will demonstrate >/= 4+/5 B shoulder MMT in above tested areas to increase ease of carrying/lifting during household chores.   4. Pt will demonstrate B ASLR >/= 20 degrees to improve tolerance to stair negotiation.   5. Pt will be I c HEP to promote wellness at home.    "

## 2017-04-26 ENCOUNTER — CLINICAL SUPPORT (OUTPATIENT)
Dept: REHABILITATION | Facility: HOSPITAL | Age: 62
End: 2017-04-26
Attending: ORTHOPAEDIC SURGERY
Payer: MEDICARE

## 2017-04-26 DIAGNOSIS — M62.9 HAMSTRING TIGHTNESS OF BOTH LOWER EXTREMITIES: ICD-10-CM

## 2017-04-26 DIAGNOSIS — M62.89 QUADRICEP TIGHTNESS: ICD-10-CM

## 2017-04-26 DIAGNOSIS — R29.898 DECREASED ROM OF NECK: ICD-10-CM

## 2017-04-26 DIAGNOSIS — M25.69 DECREASED ROM OF TRUNK AND BACK: ICD-10-CM

## 2017-04-26 PROCEDURE — 97110 THERAPEUTIC EXERCISES: CPT | Mod: PN

## 2017-04-26 PROCEDURE — 97140 MANUAL THERAPY 1/> REGIONS: CPT | Mod: PN

## 2017-04-26 NOTE — PROGRESS NOTES
"TIME RECORD    Date:  04/26/2017    Start Time:  9:00  Stop Time:  10:00     PROCEDURES:    TIMED  Procedure Min.   TE supervised 35' NC   TE 15'   MT 12'             UNTIMED  Procedure Min.             Total Timed Minutes:  27'  Total Timed Units:  2  Total Untimed Units:  0  Charges Billed/# of units:  2 (1 TE, 1 MT)       Progress/Current Status    Subjective:     Patient ID: Medhat Nunez is a 62 y.o. male.  Diagnosis:   1. Decreased ROM of neck     2. Decreased ROM of trunk and back     3. Quadricep tightness     4. Hamstring tightness of both lower extremities       Pain: 5-6 /10  Pt c/o experiencing 5-6/10 pain in R lower back.  Also reports min neck pain.  States back hurts more today.    Objective:     Warm up on bike x 5' supervised by PT. Pt participated in therex per log below 1:1 with PT x 15' and supervised by PT x 30'. Pt received manual therapy consisting of pt in (L) SL for STM to (R) lumbar and thoracic paraspinal muscles x 12'. **FOTO next visit**  *AUTH 12 visits 5/9/17*  Date 4/26/17 4/21/17 4/19/17 4/12/17 4/10/17 4/7/17 4/5/17 3/31/17   FOTO 9/10 8/10 7/10 6/10 issued 4/5 3/5 2/5   auth Visit   5/9/17 9/12 8/12 7/12 6/12 5/12 4/12 3/12 2 of 12   POC 5/29/17 5/29/17 5/29/17 5/29/17 5/29/17 5/29/17 5/29/17 5/29/17   G Code 9/10 8/10 7/10  5/10 4/10 3/10 2 of 10   Cap Visit  Cap total 61.84  885.88 125.80  824.04 93.82  698.24 125.80  604.42 93.82  478.62 123.68  384.80 93.82  261.12     91.70  167.30   MHP   -  10' 10' 10' 10'   MT 12' 15' 12' 15'  17' 25 20' 35'   UT Str. NT NT 30"x3 B  NT Self 30" x 2 B 30" x 3 B - self MT   Pec Str. NT NT 3' on foam roll 3' 3'  3' foam roll 3' full roll Over half roll 3'    Snow [place] on roll NT NT held 25 x  20 x 20 x 20 x  x10   Flies on roll NT NT x10        Open book AROM NT NT x15 B 12 B  12 B 12 B     Chin Tuck   - -  held - 5" x 10 5"x10   EOM hip flexor stretch 1' B 1' B 1' 1' B  1' B      LTR 3x20" B 10x5" B 10"x5 B 10" x 5 B  10" x 5 B 10" x 5 B " "10" x 5 B 5"x10   Hip ADD str 3x30" supine 2x30" supine standing 2x30" B 1' B  2 x 30" B      HS 90/90 flossing 20 B 20 B - 15 B  15 B 12 B     Piriformis stretch 3x30' B          DKTC 30 w/SB          Seated lumbar flex/rot stretch 3' w/SB 3' w/SB         TA 5"x20 5" x 20 5"x15 5" x 15 5" x 12 5" x 12 5" x 12 5"x10   TA w/ march 2 x 10 2 x 10         DNF   -  -  - --   Cervical Agusto   - - held - - 5"x5   Rot/sb   pec stretch in door   -  nt 3 x 30" 3 x 30"     Scapular Ret NT NT 5"x12 5"x 12 5" x 10 5" x 10 5" x 12 5"x10   Lats OOT 2x10 rtb -     -   Rows OOT 2x10 rtb -     -   Horizontal Abd   -     -   Scaption   -     -   Wall Slides   -     -   Initials RS RS CK ED ED ED ED CS 1/6       Assessment:     Pt tolerated treatment well with addition of piriformis stretch and DKTC.  Pt with significant tightness in B piriformis (R worse than L).  After STM/MFR to lower back, sacral crest and piriformis, decreased pain noted.    Patient Education/Response:     Continue with HEP     Plans and Goals:     Continue c POC.     Short Term Goals (4  Weeks): 4/29/17 = Long Term Goals (8 Weeks): 5/29/17  1. Pt will report current pain </= 6/10 to improve quality of sleep.  2. Pt. Will report FOTO lumbar spine </= 20-40% limitation to increase ease of carrying/lifting at home during household chores.  3. Pt will demonstrate >/= 4+/5 B shoulder MMT in above tested areas to increase ease of carrying/lifting during household chores.   4. Pt will demonstrate B ASLR >/= 20 degrees to improve tolerance to stair negotiation.   5. Pt will be I c HEP to promote wellness at home.    "

## 2017-04-28 ENCOUNTER — CLINICAL SUPPORT (OUTPATIENT)
Dept: REHABILITATION | Facility: HOSPITAL | Age: 62
End: 2017-04-28
Attending: ORTHOPAEDIC SURGERY
Payer: MEDICARE

## 2017-04-28 DIAGNOSIS — R29.898 DECREASED ROM OF NECK: ICD-10-CM

## 2017-04-28 DIAGNOSIS — M62.89 QUADRICEP TIGHTNESS: ICD-10-CM

## 2017-04-28 DIAGNOSIS — M62.9 HAMSTRING TIGHTNESS OF BOTH LOWER EXTREMITIES: ICD-10-CM

## 2017-04-28 DIAGNOSIS — M25.69 DECREASED ROM OF TRUNK AND BACK: ICD-10-CM

## 2017-04-28 PROCEDURE — G8979 MOBILITY GOAL STATUS: HCPCS | Mod: CJ,PN

## 2017-04-28 PROCEDURE — 97140 MANUAL THERAPY 1/> REGIONS: CPT | Mod: PN

## 2017-04-28 PROCEDURE — 97110 THERAPEUTIC EXERCISES: CPT | Mod: PN

## 2017-04-28 PROCEDURE — G8978 MOBILITY CURRENT STATUS: HCPCS | Mod: CL,PN

## 2017-04-28 NOTE — PROGRESS NOTES
"TIME RECORD    Date:  04/28/2017    Start Time:  1100  Stop Time:  1205    PROCEDURES:    TIMED  Procedure Min.   Manual therapy 20   Therex 40                 UNTIMED  Procedure Min.   Bike 5         Total Timed Minutes:  60  Total Timed Units:  4  Total Untimed Units:  0  Charges Billed/# of units:  4 MTx1, TEx3      Progress/Current Status    Subjective:     Patient ID: Medhat Nunez is a 62 y.o. male.  Diagnosis:   1. Decreased ROM of neck     2. Decreased ROM of trunk and back     3. Quadricep tightness     4. Hamstring tightness of both lower extremities       Pain: 5-6 /10  Pt c/o experiencing "8/10" pain in R lower back.  Also reports "5-6/10" neck pain.  States back hurts more today.    Objective:     Warm up on bike x 5' supervised. Pt participated in therex per log below 1:1 with PTA x 40'. Pt received manual therapy including STM with elongation cervical paraspinals, and B UT with manual stretch in supine, to  (L) SL for STM to (R) lumbar and thoracic paraspinal muscles and QL x 20'.  Added instruction and performed sitting QL stretch   *AUTH 12 visits 5/9/17*  Date 4/28/17 4/26/17 4/21/17 4/19/17 4/12/17 4/10/17 4/7/17 4/5/17 3/31/17   FOTO 10/10 9/10 8/10 7/10 6/10 issued 4/5 3/5 2/5   auth Visit   5/9/17 10/12 9/12 8/12 7/12 6/12 5/12 4/12 3/12 2 of 12   POC 5/29/17 5/29/17 5/29/17 5/29/17 5/29/17 5/29/17 5/29/17 5/29/17 5/29/17   G Code 10/10 9/10 8/10 7/10  5/10 4/10 3/10 2 of 10   Cap Visit  Cap total   125.80  1011.68 61.84  885.88 125.80  824.04 93.82  698.24 125.80  604.42 93.82  478.62 123.68  384.80 93.82  261.12     91.70  167.30   MHP    -  10' 10' 10' 10'   MT 20' 12' 15' 12' 15'  17' 25 20' 35'   UT Str. 30"x3 B NT NT 30"x3 B  NT Self 30" x 2 B 30" x 3 B - self MT   Pec Str. 3' on foam roll NT NT 3' on foam roll 3' 3'  3' foam roll 3' full roll Over half roll 3'    Snow [place] on roll NT NT NT held 25 x  20 x 20 x 20 x  x10   Flies on roll NT NT NT x10        Open book AROM 2x10 NT NT " "x15 B 12 B  12 B 12 B     Chin Tuck 5"x10   - -  held - 5" x 10 5"x10   EOM hip flexor stretch  1' B 1' B 1' 1' B  1' B      LTR 20"x3 3x20" B 10x5" B 10"x5 B 10" x 5 B  10" x 5 B 10" x 5 B 10" x 5 B 5"x10   Hip ADD str 30"x3 supine 3x30" supine 2x30" supine standing 2x30" B 1' B  2 x 30" B      HS 90/90 flossing 30 B 20 B 20 B - 15 B  15 B 12 B     Piriformis stretch 30"x3 B 3x30' B          DKTC 30X w/SB 30 w/SB          Seated lumbar flex/rot stretch 4' w/SB 3' w/SB 3' w/SB         TA 5"x20 5"x20 5" x 20 5"x15 5" x 15 5" x 12 5" x 12 5" x 12 5"x10   TA w/ march 1' 2 x 10 2 x 10         DNF    -  -  - --   Cervical Agusot    - - held - - 5"x5   Rot/sb   pec stretch in door    -  nt 3 x 30" 3 x 30"     Scapular Ret 5" x12 NT NT 5"x12 5"x 12 5" x 10 5" x 10 5" x 12 5"x10   Lats  OOT 2x10 rtb -     -   Rows  OOT 2x10 rtb -     -   Horizontal Abd    -     -   Scaption    -     -   Wall Slides    -     -   Initials DH 1/6 RS RS CK ED ED ED ED CS 1/6       Assessment:     Significant muscle tension noted R QL compared to left with manual therapy.  Patient able to complete all therex without increased complaint of pain or difficulty.  "Better now"  Not specific to scale.    Patient Education/Response:     Instructed in and patient performed sitting B QL stretch.  Demonstrated understanding.    Plans and Goals:     Continue c POC.     Short Term Goals (4  Weeks): 4/29/17 = Long Term Goals (8 Weeks): 5/29/17  1. Pt will report current pain </= 6/10 to improve quality of sleep.  2. Pt. Will report FOTO lumbar spine </= 20-40% limitation to increase ease of carrying/lifting at home during household chores.  3. Pt will demonstrate >/= 4+/5 B shoulder MMT in above tested areas to increase ease of carrying/lifting during household chores.   4. Pt will demonstrate B ASLR >/= 20 degrees to improve tolerance to stair negotiation.   5. Pt will be I c HEP to promote wellness at home.      "

## 2017-04-28 NOTE — PROGRESS NOTES
Functional Limitation reporting via FOTO lumbar survey:     FOTO lumbar survey score 4/28/17: 67% limited ( 60%< 80% limited)   FOTO lumbar survey score on initial evaluation: 58% limited  FOTO lumbar goal score: 20%<40% limited

## 2017-05-01 ENCOUNTER — CLINICAL SUPPORT (OUTPATIENT)
Dept: REHABILITATION | Facility: HOSPITAL | Age: 62
End: 2017-05-01
Attending: ORTHOPAEDIC SURGERY
Payer: MEDICARE

## 2017-05-01 DIAGNOSIS — M25.69 DECREASED ROM OF TRUNK AND BACK: ICD-10-CM

## 2017-05-01 DIAGNOSIS — R29.898 DECREASED ROM OF NECK: ICD-10-CM

## 2017-05-01 DIAGNOSIS — M62.89 QUADRICEP TIGHTNESS: ICD-10-CM

## 2017-05-01 DIAGNOSIS — M62.9 HAMSTRING TIGHTNESS OF BOTH LOWER EXTREMITIES: ICD-10-CM

## 2017-05-01 PROCEDURE — 97110 THERAPEUTIC EXERCISES: CPT | Mod: PN

## 2017-05-01 PROCEDURE — 97140 MANUAL THERAPY 1/> REGIONS: CPT | Mod: PN

## 2017-05-01 NOTE — PROGRESS NOTES
"TIME RECORD    Date:  05/01/2017    Start Time:  1000  Stop Time:  1105    PROCEDURES:    TIMED  Procedure Min.   Therex supervised 30  NC   Therex 10   MT 20             UNTIMED  Procedure Min.   Bike 5 NC         Total Timed Minutes: 30  Total Timed Units: 2  Total Untimed Units:  0  Charges Billed/# of units:  1 TE, 1 MT    Progress/Current Status    Subjective:     Patient ID: Medhat Nunez is a 62 y.o. male.  Diagnosis:   1. Decreased ROM of neck     2. Decreased ROM of trunk and back     3. Quadricep tightness     4. Hamstring tightness of both lower extremities       Pain: 5-6 /10  Pt c/o experiencing "6/10" pain in R lower back and "5/10" neck pain.  States back hurts more today.    Objective:     Pt participated in therex per log below with supervision x 30 minuters and 1:1 with PTA x 10 minutes . Pt received manual therapy including STM with elongation cervical paraspinals, and B UT with manual stretch in supine, to  (L) SL for STM to (R) lumbar and thoracic paraspinal muscles and QL x 20'.    *AUTH 12 visits 5/9/17*  Date 5/1/17 4/28/17 4/26/17 4/21/17 4/19/17 4/12/17 4/10/17 4/7/17 4/5/17 3/31/17   FOTO Done 10/10 9/10 8/10 7/10 6/10 issued 4/5 3/5 2/5   auth Visit   5/9/17 11/12 10/12 9/12 8/12 7/12 6/12 5/12 4/12 3/12 2 of 12   POC 5/29/17 5/29/17 5/29/17 5/29/17 5/29/17 5/29/17 5/29/17 5/29/17 5/29/17 5/29/17   G Code 1/10 10/10 9/10 8/10 7/10  5/10 4/10 3/10 2 of 10   Cap Visit  Cap total 61.84  1073.52   125.80  1011.68 61.84  885.88 125.80  824.04 93.82  698.24 125.80  604.42 93.82  478.62 123.68  384.80 93.82  261.12     91.70  167.30   MHP     -  10' 10' 10' 10'   MT 20' 20' 12' 15' 12' 15'  17' 25 20' 35'   UT Str. 30"x3 B 30"x3 B NT NT 30"x3 B  NT Self 30" x 2 B 30" x 3 B - self MT   Pec Str. 3' on foam roll 3' on foam roll NT NT 3' on foam roll 3' 3'  3' foam roll 3' full roll Over half roll 3'    Snow [place] on roll NT NT NT NT held 25 x  20 x 20 x 20 x  x10   Flies on roll NT NT NT NT x10  " "      Open book AROM 2x10 B 2x10 NT NT x15 B 12 B  12 B 12 B     Chin Tuck 5"x10 5"x10   - -  held - 5" x 10 5"x10   EOM hip flexor stretch   1' B 1' B 1' 1' B  1' B      LTR 20"x3 B 20"x3 3x20" B 10x5" B 10"x5 B 10" x 5 B  10" x 5 B 10" x 5 B 10" x 5 B 5"x10   Hip ADD str 30"x3  supine 30"x3 supine 3x30" supine 2x30" supine standing 2x30" B 1' B  2 x 30" B      HS 90/90 flossing 30x B 30 B 20 B 20 B - 15 B  15 B 12 B     Piriformis stretch 30"x3 B 30"x3 B 3x30' B          DKTC 30x w/PB 30X w/SB 30 w/SB          Seated lumbar flex/rot stretch 4' w/PB 4' w/SB 3' w/SB 3' w/SB         TA 5"x20 5"x20 5"x20 5" x 20 5"x15 5" x 15 5" x 12 5" x 12 5" x 12 5"x10   TA w/ march 1' 1' 2 x 10 2 x 10         DNF -    -  -  - --   Cervical Agusto -    - - held - - 5"x5   Rot/sb   pec stretch in door -    -  nt 3 x 30" 3 x 30"     Scapular Ret 5"x12 5" x12 NT NT 5"x12 5"x 12 5" x 10 5" x 10 5" x 12 5"x10   Lats RTB 2x10  OOT 2x10 rtb -     -   Rows RTB  2x10  OOT 2x10 rtb -     -   Horizontal Abd -    -     -   Scaption -    -     -   Wall Slides -    -     -   Initials CS 2/6 DH 1/6 RS RS CK ED ED ED ED CS 1/6       Assessment:     Patient reported decreased pain after session.    Patient Education/Response:     Continue with HEP as tolerated.    Plans and Goals:     Continue c POC.     Short Term Goals (4  Weeks): 4/29/17 = Long Term Goals (8 Weeks): 5/29/17  1. Pt will report current pain </= 6/10 to improve quality of sleep.  2. Pt. Will report FOTO lumbar spine </= 20-40% limitation to increase ease of carrying/lifting at home during household chores.  3. Pt will demonstrate >/= 4+/5 B shoulder MMT in above tested areas to increase ease of carrying/lifting during household chores.   4. Pt will demonstrate B ASLR >/= 20 degrees to improve tolerance to stair negotiation.   5. Pt will be I c HEP to promote wellness at home.      "

## 2017-05-03 ENCOUNTER — CLINICAL SUPPORT (OUTPATIENT)
Dept: REHABILITATION | Facility: HOSPITAL | Age: 62
End: 2017-05-03
Attending: ORTHOPAEDIC SURGERY
Payer: MEDICARE

## 2017-05-03 DIAGNOSIS — R29.898 DECREASED ROM OF NECK: ICD-10-CM

## 2017-05-03 DIAGNOSIS — M62.89 QUADRICEP TIGHTNESS: ICD-10-CM

## 2017-05-03 DIAGNOSIS — M62.9 HAMSTRING TIGHTNESS OF BOTH LOWER EXTREMITIES: ICD-10-CM

## 2017-05-03 DIAGNOSIS — M25.69 DECREASED ROM OF TRUNK AND BACK: ICD-10-CM

## 2017-05-03 PROCEDURE — 97140 MANUAL THERAPY 1/> REGIONS: CPT | Mod: PN

## 2017-05-03 PROCEDURE — 97110 THERAPEUTIC EXERCISES: CPT | Mod: PN

## 2017-05-03 NOTE — PROGRESS NOTES
"TIME RECORD    Date:  05/03/2017    Start Time:  0955  Stop Time:  1055    PROCEDURES:    TIMED  Procedure Min.   MT 20   TE 40                 UNTIMED  Procedure Min.             Total Timed Minutes:  60  Total Timed Units:  4  Total Untimed Units:  0  Charges Billed/# of units:  4 (1MT, 3 TE)      Progress/Current Status    Subjective:     Patient ID: Medhat Nunez is a 62 y.o. male.  Diagnosis:   1. Decreased ROM of neck     2. Decreased ROM of trunk and back     3. Quadricep tightness     4. Hamstring tightness of both lower extremities       Pain: 3 /10  Pt stated that his shoulder is much better and he doesn't have problems in his shoulders anymore but his main problems are still neck and back.    Objective:     Session initiated with objective mearsures x x 15'.   Pt received manual therapy including STM with elongation cervical paraspinals, and B UT with manual stretch in supine, to  (L) SL for STM to (R) lumbar and thoracic paraspinal muscles and QL x 20'.  Pt participated in therex per log below with supervision x 25 minuters and 1:1 with PT.    Date 5/3/17 5/1/17 4/28/17 4/26/17 4/21/17 4/19/17 4/12/17 4/10/17 4/7/17 4/5/17 3/31/17   FOTO At d/c Done 10/10 9/10 8/10 7/10 6/10 issued 4/5 3/5 2/5   auth Visit   5/9/17 12/12  auth measures done 11/12 10/12 9/12 8/12 7/12 6/12 5/12 4/12 3/12 2 of 12   POC 5/29/17 5/29/17 5/29/17 5/29/17 5/29/17 5/29/17 5/29/17 5/29/17 5/29/17 5/29/17 5/29/17   G Code 2/10 1/10 10/10 9/10 8/10 7/10  5/10 4/10 3/10 2 of 10   Cap Visit  Cap total  61.84  1073.52   125.80  1011.68 61.84  885.88 125.80  824.04 93.82  698.24 125.80  604.42 93.82  478.62 123.68  384.80 93.82  261.12     91.70  167.30   MHP --     -  10' 10' 10' 10'   MT 20' 20' 20' 12' 15' 12' 15'  17' 25 20' 35'   UT Str. 30"x3 B 30"x3 B 30"x3 B NT NT 30"x3 B  NT Self 30" x 2 B 30" x 3 B - self MT   Pec Str. 3' on foam roll 3' on foam roll 3' on foam roll NT NT 3' on foam roll 3' 3'  3' foam roll 3' full roll " "Over half roll 3'    Snow [place] on roll -- NT NT NT NT held 25 x  20 x 20 x 20 x  x10   Flies on roll -- NT NT NT NT x10        Open book AROM 2x10 B 2x10 B 2x10 NT NT x15 B 12 B  12 B 12 B     Chin Tuck 5"x10 5"x10 5"x10   - -  held - 5" x 10 5"x10   EOM hip flexor stretch --   1' B 1' B 1' 1' B  1' B      LTR 20"x3 B 20"x3 B 20"x3 3x20" B 10x5" B 10"x5 B 10" x 5 B  10" x 5 B 10" x 5 B 10" x 5 B 5"x10   Hip ADD str 30"x3  supine 30"x3  supine 30"x3 supine 3x30" supine 2x30" supine standing 2x30" B 1' B  2 x 30" B      HS 90/90 flossing 30x B 30x B 30 B 20 B 20 B - 15 B  15 B 12 B     Piriformis stretch 30x3 B 30"x3 B 30"x3 B 3x30' B          DKTC 30x w/PB 30x w/PB 30X w/SB 30 w/SB          Seated lumbar flex/rot stretch Next 4' w/PB 4' w/SB 3' w/SB 3' w/SB         TA 5"x20 5"x20 5"x20 5"x20 5" x 20 5"x15 5" x 15 5" x 12 5" x 12 5" x 12 5"x10   TA w/ march 1' 1' 1' 2 x 10 2 x 10         DNF  -    -  -  - --   Cervical Agusto  -    - - held - - 5"x5   Rot/sb   pec stretch in door  -    -  nt 3 x 30" 3 x 30"     Scapular Ret OOT 5"x12 5" x12 NT NT 5"x12 5"x 12 5" x 10 5" x 10 5" x 12 5"x10   Lats OOT RTB 2x10  OOT 2x10 rtb -     -   Rows OOT RTB  2x10  OOT 2x10 rtb -     -   Horizontal Abd -- -    -     -   Scaption -- -    -     -   Wall Slides -- -    -     -   Initials FS CS 2/6 DH 1/6 RS RS CK ED ED ED ED CS 1/6     AROM: CERVICAL LUMBAR   Flexion 50 75 c pain   Extension 25 c pain 10 c pain   Right side bending 25 10 c pain   Left side bending 30 12 c pain   Right rotation 35  25% WNL   Left rotation 45  25% WNL      Shoulder   Right     Left   Pain/Dysfunction with Movement     AROM PROM MMT AROM PROM MMT     flexion WFL NT 4/5 WFL NT 4/5     abduction WFL NT 4-/5 WFL NT 4/5     Internal rotation WFL NT 5/5 WFL NT 5/5     ER at 0° abd WFL NT 4/5 WFL NT 4/5        Hip   Right     Left   Pain/Dysfunction with Movement     AROM PROM MMT AROM PROM MMT     Flexion WFL WFL 4/5 * WFL 4/5 Pain in groin B   Extension * NT " 3+/5 * NT 3+/5 * AROM and MMT limited by muscle tightness   Abduction WFL WFL 4+/5 WFL WFL 4+/5 Quad pain c MMT B   Internal rotation WFL WFL 5/5 WFL WFL 5/5     External rotation WFL WFL 4+/5 WFL WFL 4+/5     Knee   Right     Left   Pain/Dysfunction with Movement     AROM PROM MMT AROM PROM MMT     Flexion WFL WFL 5/5 WFL WFL 5/5     Extension WFL WFL 5/5 WFL WFL 5/5        Ankle Right   Left   Pain/Dysfunction with Movement     AROM MMT AROM MMT     Plantarflexion WFL 5/5 WFL 5/5     Dorsiflexion WFL 5/5 WFL 5/5     Inversion WFL 5/5 WFL 5/5     Eversion WFL 5/5 WFL 5/5            Assessment:     Pt ROM in his neck has improved and strength in B LEs has improved but pain is still present at most end ranges in neck and with most overpressure to low back.     Patient Education/Response:     CONt HEP    Plans and Goals:     Continue c POC.     Short Term Goals (4  Weeks): 4/29/17 = Long Term Goals (8 Weeks): 5/29/17  1. Pt will report current pain </= 6/10 to improve quality of sleep.  2. Pt. Will report FOTO lumbar spine </= 20-40% limitation to increase ease of carrying/lifting at home during household chores.  3. Pt will demonstrate >/= 4+/5 B shoulder MMT in above tested areas to increase ease of carrying/lifting during household chores.   4. Pt will demonstrate B ASLR >/= 20 degrees to improve tolerance to stair negotiation.   5. Pt will be I c HEP to promote wellness at home.

## 2017-05-10 ENCOUNTER — CLINICAL SUPPORT (OUTPATIENT)
Dept: REHABILITATION | Facility: HOSPITAL | Age: 62
End: 2017-05-10
Attending: ORTHOPAEDIC SURGERY
Payer: MEDICARE

## 2017-05-10 DIAGNOSIS — M25.69 DECREASED ROM OF TRUNK AND BACK: ICD-10-CM

## 2017-05-10 DIAGNOSIS — R29.898 DECREASED ROM OF NECK: ICD-10-CM

## 2017-05-10 DIAGNOSIS — M62.89 QUADRICEP TIGHTNESS: ICD-10-CM

## 2017-05-10 DIAGNOSIS — M62.9 HAMSTRING TIGHTNESS OF BOTH LOWER EXTREMITIES: ICD-10-CM

## 2017-05-10 PROCEDURE — 97110 THERAPEUTIC EXERCISES: CPT | Mod: PN

## 2017-05-10 PROCEDURE — 97140 MANUAL THERAPY 1/> REGIONS: CPT | Mod: PN

## 2017-05-10 NOTE — PROGRESS NOTES
"TIME RECORD    Date:  05/10/2017    Start Time:  1100  Stop Time:  1203    PROCEDURES:    TIMED  Procedure Min.   MT 23   TE 40                 UNTIMED  Procedure Min.             Total Timed Minutes:  63  Total Timed Units:  4  Total Untimed Units:  0  Charges Billed/# of units:  4 (1MT, 3 TE)      Progress/Current Status    Subjective:     Patient ID: Medhat Nunez is a 62 y.o. male.  Diagnosis:   1. Decreased ROM of neck     2. Decreased ROM of trunk and back     3. Quadricep tightness     4. Hamstring tightness of both lower extremities       Pain: Patient reports 7/10 pain neck and LB today.    Objective:      Pt received manual therapy including STM with elongation cervical paraspinals, and B UT stm/mfr with manual stretch in supine then to  (L) SL for STM to (R) lumbar and thoracic paraspinal muscles and QL x 23'.  Pt participated in therex per log below 1:1 with PTA x   Date 5/10/17 5/3/17 5/1/17 4/28/17 4/26/17 4/21/17 4/19/17 4/12/17 4/10/17 4/7/17 4/5/17 3/31/17   FOTO - At d/c Done 10/10 9/10 8/10 7/10 6/10 issued 4/5 3/5 2/5   auth Visit   7/9/17 13/24 12/12  auth measures done 11/12 10/12 9/12 8/12 7/12 6/12 5/12 4/12 3/12 2 of 12   POC 5/29/17 5/29/17 5/29/17 5/29/17 5/29/17 5/29/17 5/29/17 5/29/17 5/29/17 5/29/17 5/29/17 5/29/17   G Code 3/10 2/10 1/10 10/10 9/10 8/10 7/10  5/10 4/10 3/10 2 of 10   Cap Visit  Cap total 125.80  1325.12 125.80 61.84  1073.52   125.80  1011.68 61.84  885.88 125.80  824.04 93.82  698.24 125.80  604.42 93.82  478.62 123.68  384.80 93.82  261.12     91.70  167.30   MHP  --     -  10' 10' 10' 10'   MT 23' 20' 20' 20' 12' 15' 12' 15'  17' 25 20' 35'   UT Str. 30"x3 B 30"x3 B 30"x3 B 30"x3 B NT NT 30"x3 B  NT Self 30" x 2 B 30" x 3 B - self MT   Pec Str. 3' on foam roll 3' on foam roll 3' on foam roll 3' on foam roll NT NT 3' on foam roll 3' 3'  3' foam roll 3' full roll Over half roll 3'    Snow [place] on roll - -- NT NT NT NT held 25 x  20 x 20 x 20 x  x10   Flies on roll " "- -- NT NT NT NT x10        Open book AROM 2x10 B 2x10 B 2x10 B 2x10 NT NT x15 B 12 B  12 B 12 B     Chin Tuck 5"x12 5"x10 5"x10 5"x10   - -  held - 5" x 10 5"x10   EOM hip flexor stretch  --   1' B 1' B 1' 1' B  1' B      LTR 20"x3 B 20"x3 B 20"x3 B 20"x3 3x20" B 10x5" B 10"x5 B 10" x 5 B  10" x 5 B 10" x 5 B 10" x 5 B 5"x10   Hip ADD str 30"x3 supine 30"x3  supine 30"x3  supine 30"x3 supine 3x30" supine 2x30" supine standing 2x30" B 1' B  2 x 30" B      HS 90/90 flossing 30x B 30x B 30x B 30 B 20 B 20 B - 15 B  15 B 12 B     Piriformis stretch 30"x3 B 30x3 B 30"x3 B 30"x3 B 3x30' B          DKTC 30x w/PB 30x w/PB 30x w/PB 30X w/SB 30 w/SB          Seated lumbar flex/rot stretch 4' w/PB Next 4' w/PB 4' w/SB 3' w/SB 3' w/SB         TA 5"x20 5"x20 5"x20 5"x20 5"x20 5" x 20 5"x15 5" x 15 5" x 12 5" x 12 5" x 12 5"x10   TA w/ march 1' 1' 1' 1' 2 x 10 2 x 10         DNF -  -    -  -  - --   Cervical Agusto -  -    - - held - - 5"x5   Rot/sb   pec stretch in door -  -    -  nt 3 x 30" 3 x 30"     Scapular Ret 5"x12 OOT 5"x12 5" x12 NT NT 5"x12 5"x 12 5" x 10 5" x 10 5" x 12 5"x10   Lats RTC  2x12 OOT RTB 2x10  OOT 2x10 rtb -     -   Rows RTC  2x12 OOT RTB  2x10  OOT 2x10 rtb -     -   Horizontal Abd - -- -    -     -   Scaption - -- -    -     -   Wall Slides - -- -    -     -   Initials CS 1/6 FS CS 2/6 DH 1/6 RS RS CK ED ED ED ED CS 1/6        Assessment:     Patient reported feeling better after session.    Patient Education/Response:     CONT HEP    Plans and Goals:     Continue c POC.     Short Term Goals (4  Weeks): 4/29/17 = Long Term Goals (8 Weeks): 5/29/17  1. Pt will report current pain </= 6/10 to improve quality of sleep.  2. Pt. Will report FOTO lumbar spine </= 20-40% limitation to increase ease of carrying/lifting at home during household chores.  3. Pt will demonstrate >/= 4+/5 B shoulder MMT in above tested areas to increase ease of carrying/lifting during household chores.   4. Pt will demonstrate B ASLR " >/= 20 degrees to improve tolerance to stair negotiation.   5. Pt will be I c HEP to promote wellness at home.

## 2017-05-12 ENCOUNTER — CLINICAL SUPPORT (OUTPATIENT)
Dept: REHABILITATION | Facility: HOSPITAL | Age: 62
End: 2017-05-12
Attending: ORTHOPAEDIC SURGERY
Payer: MEDICARE

## 2017-05-12 DIAGNOSIS — R29.898 DECREASED ROM OF NECK: ICD-10-CM

## 2017-05-12 DIAGNOSIS — M62.9 HAMSTRING TIGHTNESS OF BOTH LOWER EXTREMITIES: ICD-10-CM

## 2017-05-12 DIAGNOSIS — M25.69 DECREASED ROM OF TRUNK AND BACK: ICD-10-CM

## 2017-05-12 DIAGNOSIS — M62.89 QUADRICEP TIGHTNESS: ICD-10-CM

## 2017-05-12 PROCEDURE — 97140 MANUAL THERAPY 1/> REGIONS: CPT | Mod: PN

## 2017-05-12 PROCEDURE — 97110 THERAPEUTIC EXERCISES: CPT | Mod: PN

## 2017-05-12 NOTE — PROGRESS NOTES
"TIME RECORD    Date:  05/12/2017    Start Time:  1055  Stop Time:  1200  PROCEDURES:    TIMED  Procedure Min.   MT 25   TE 40                 UNTIMED  Procedure Min.             Total Timed Minutes:  65  Total Timed Units:  4  Total Untimed Units:  0  Charges Billed/# of units:  4 (1MT, 3 TE)      Progress/Current Status    Subjective:     Patient ID: Medhat Nunez is a 62 y.o. male.  Diagnosis:   1. Decreased ROM of neck     2. Decreased ROM of trunk and back     3. Quadricep tightness     4. Hamstring tightness of both lower extremities       Pain: Patient reports improvement with neck and LB pain.    Objective:      Pt received manual therapy including STM with elongation cervical paraspinals, TFM to suboccipital mms and B UT stm/mfr with manual stretch in supine then to  (L) SL for STM/MFR to (R) lumbar and thoracic paraspinal muscles and QL, QL stretch x 25'.  Pt participated in therex per log below 1:1 with PTA x 40 minutes.  Date 5/12/17 5/10/17 5/3/17 5/1/17 4/28/17 4/26/17 4/21/17 4/19/17 4/12/17 4/10/17 4/7/17 4/5/17 3/31/17   FOTO - - At d/c Done 10/10 9/10 8/10 7/10 6/10 issued 4/5 3/5 2/5   auth Visit   7/9/17 14/24 13/24 12/12  auth measures done 11/12 10/12 9/12 8/12 7/12 6/12 5/12 4/12 3/12 2 of 12   POC 5/29/17 5/29/17 5/29/17 5/29/17 5/29/17 5/29/17 5/29/17 5/29/17 5/29/17 5/29/17 5/29/17 5/29/17 5/29/17   G Code 4/10 3/10 2/10 1/10 10/10 9/10 8/10 7/10  5/10 4/10 3/10 2 of 10   Cap Visit  Cap total 125.80  1450.92 125.80  1325.12 125.80 61.84  1073.52   125.80  1011.68 61.84  885.88 125.80  824.04 93.82  698.24 125.80  604.42 93.82  478.62 123.68  384.80 93.82  261.12     91.70  167.30   MHP -  --     -  10' 10' 10' 10'   MT 25' 23' 20' 20' 20' 12' 15' 12' 15'  17' 25 20' 35'   UT Str. 30"x3 B 30"x3 B 30"x3 B 30"x3 B 30"x3 B NT NT 30"x3 B  NT Self 30" x 2 B 30" x 3 B - self MT   Pec Str. 3' on    Foam roll 3' on foam roll 3' on foam roll 3' on foam roll 3' on foam roll NT NT 3' on foam roll 3' " "3'  3' foam roll 3' full roll Over half roll 3'    Snow [place] on roll - - -- NT NT NT NT held 25 x  20 x 20 x 20 x  x10   Flies on roll  - -- NT NT NT NT x10        Open book AROM 2x10 B 2x10 B 2x10 B 2x10 B 2x10 NT NT x15 B 12 B  12 B 12 B     Chin Tuck 5"x12 5"x12 5"x10 5"x10 5"x10   - -  held - 5" x 10 5"x10   EOM hip flexor stretch -  --   1' B 1' B 1' 1' B  1' B      LTR 30"x3 B 20"x3 B 20"x3 B 20"x3 B 20"x3 3x20" B 10x5" B 10"x5 B 10" x 5 B  10" x 5 B 10" x 5 B 10" x 5 B 5"x10   Hip ADD str 30"x3  supine 30"x3 supine 30"x3  supine 30"x3  supine 30"x3 supine 3x30" supine 2x30" supine standing 2x30" B 1' B  2 x 30" B      HS 90/90 flossing 30x B 30x B 30x B 30x B 30 B 20 B 20 B - 15 B  15 B 12 B     Piriformis stretch 30"x3 B 30"x3 B 30x3 B 30"x3 B 30"x3 B 3x30' B          DKTC 30x w/PB 30x w/PB 30x w/PB 30x w/PB 30X w/SB 30 w/SB          Seated lumbar flex/rot stretch 4' w/PB 4' w/PB Next 4' w/PB 4' w/SB 3' w/SB 3' w/SB         TA 5"x20 5"x20 5"x20 5"x20 5"x20 5"x20 5" x 20 5"x15 5" x 15 5" x 12 5" x 12 5" x 12 5"x10   TA w/ march 1' 1' 1' 1' 1' 2 x 10 2 x 10         DNF - -  -    -  -  - --   Cervical Agusto - -  -    - - held - - 5"x5   Rot/sb   pec stretch in door - -  -    -  nt 3 x 30" 3 x 30"     Scapular Ret 5"x12 5"x12 OOT 5"x12 5" x12 NT NT 5"x12 5"x 12 5" x 10 5" x 10 5" x 12 5"x10   Lats RTC  2x12 RTC  2x12 OOT RTB 2x10  OOT 2x10 rtb -     -   Rows RTC  2x12 RTC  2x12 OOT RTB  2x10  OOT 2x10 rtb -     -   Horizontal Abd - - -- -    -     -   Scaption - - -- -    -     -   Wall Slides - - -- -    -     -   Initials CS 2/6 CS 1/6 FS CS 2/6 DH 1/6 RS RS CK ED ED ED ED CS 1/6        Assessment:     Patient reported experiencing numbness in hands with Lats and rows but dissipated once completed.    Patient Education/Response:     CONT HEP    Plans and Goals:     Continue c POC.     Short Term Goals (4  Weeks): 4/29/17 = Long Term Goals (8 Weeks): 5/29/17  1. Pt will report current pain </= 6/10 to improve " quality of sleep.  2. Pt. Will report FOTO lumbar spine </= 20-40% limitation to increase ease of carrying/lifting at home during household chores.  3. Pt will demonstrate >/= 4+/5 B shoulder MMT in above tested areas to increase ease of carrying/lifting during household chores.   4. Pt will demonstrate B ASLR >/= 20 degrees to improve tolerance to stair negotiation.   5. Pt will be I c HEP to promote wellness at home.

## 2017-05-17 ENCOUNTER — CLINICAL SUPPORT (OUTPATIENT)
Dept: REHABILITATION | Facility: HOSPITAL | Age: 62
End: 2017-05-17
Attending: ORTHOPAEDIC SURGERY
Payer: MEDICARE

## 2017-05-17 DIAGNOSIS — R29.898 DECREASED ROM OF NECK: ICD-10-CM

## 2017-05-17 DIAGNOSIS — M62.89 QUADRICEP TIGHTNESS: ICD-10-CM

## 2017-05-17 DIAGNOSIS — M25.69 DECREASED ROM OF TRUNK AND BACK: ICD-10-CM

## 2017-05-17 DIAGNOSIS — M62.9 HAMSTRING TIGHTNESS OF BOTH LOWER EXTREMITIES: ICD-10-CM

## 2017-05-17 PROCEDURE — 97110 THERAPEUTIC EXERCISES: CPT | Mod: PN

## 2017-05-17 PROCEDURE — 97140 MANUAL THERAPY 1/> REGIONS: CPT | Mod: PN

## 2017-05-17 NOTE — PROGRESS NOTES
"TIME RECORD    Date:  05/17/2017    Start Time:  1000  Stop Time:  1100  PROCEDURES:    TIMED  Procedure Min.   MT 15   TE 20   TE supervised 25             UNTIMED  Procedure Min.             Total Timed Minutes:  35  Total Timed Units:  2  Total Untimed Units:  0  Charges Billed/# of units:  2 (1MT, 1 TE)      Progress/Current Status    Subjective:     Patient ID: Medhat Nunez is a 62 y.o. male.  Diagnosis:   1. Decreased ROM of neck     2. Decreased ROM of trunk and back     3. Quadricep tightness     4. Hamstring tightness of both lower extremities       Pain: Patient reports LBP > neck pain today.      Objective:      Pt received manual therapy in (L) SL for STM/MFR to (R) lumbar and thoracic paraspinal muscles and QL, QL stretch x 15'.  Pt participated in therex per log below 1:1 with PT x 20 minutes then supervised x 25'.  .   Date 5/17/17 5/12/17 5/10/17 5/3/17 5/1/17 4/28/17 4/26/17 4/21/17 4/19/17 4/12/17 4/10/17 4/7/17 4/5/17 3/31/17   FOTO  - - At d/c Done 10/10 9/10 8/10 7/10 6/10 issued 4/5 3/5 2/5   auth Visit   7/9/17 15/24 14/24 13/24 12/12  auth measures done 11/12 10/12 9/12 8/12 7/12 6/12 5/12 4/12 3/12 2 of 12   POC 5/29/17 5/29/17 5/29/17 5/29/17 5/29/17 5/29/17 5/29/17 5/29/17 5/29/17 5/29/17 5/29/17 5/29/17 5/29/17 5/29/17   G Code 5/10 4/10 3/10 2/10 1/10 10/10 9/10 8/10 7/10  5/10 4/10 3/10 2 of 10   Cap Visit  Cap total 61.84  1512.76 125.80  1450.92 125.80  1325.12 125.80 61.84  1073.52   125.80  1011.68 61.84  885.88 125.80  824.04 93.82  698.24 125.80  604.42 93.82  478.62 123.68  384.80 93.82  261.12     91.70  167.30   MHP  -  --     -  10' 10' 10' 10'   MT 15' 25' 23' 20' 20' 20' 12' 15' 12' 15'  17' 25 20' 35'   UT Str. 30"x3 B 30"x3 B 30"x3 B 30"x3 B 30"x3 B 30"x3 B NT NT 30"x3 B  NT Self 30" x 2 B 30" x 3 B - self MT   Levator Str 30"x3 B                Pec Str. oot 3' on    Foam roll 3' on foam roll 3' on foam roll 3' on foam roll 3' on foam roll NT NT 3' on foam roll 3' 3'  " "3' foam roll 3' full roll Over half roll 3'    [place] on roll  - - -- NT NT NT NT held 25 x  20 x 20 x 20 x  x10   Flies on roll   - -- NT NT NT NT x10        Open book AROM oot 2x10 B 2x10 B 2x10 B 2x10 B 2x10 NT NT x15 B 12 B  12 B 12 B     Chin Tuck oot 5"x12 5"x12 5"x10 5"x10 5"x10   - -  held - 5" x 10 5"x10   EOM hip flexor stretch  -  --   1' B 1' B 1' 1' B  1' B      LTR 30"x3 L only 30"x3 B 20"x3 B 20"x3 B 20"x3 B 20"x3 3x20" B 10x5" B 10"x5 B 10" x 5 B  10" x 5 B 10" x 5 B 10" x 5 B 5"x10   Hip ADD str 30"x3 supine 30"x3  supine 30"x3 supine 30"x3  supine 30"x3  supine 30"x3 supine 3x30" supine 2x30" supine standing 2x30" B 1' B  2 x 30" B      HS 90/90 flossing 30x B 30x B 30x B 30x B 30x B 30 B 20 B 20 B - 15 B  15 B 12 B     Piriformis stretch 30"x3 B 30"x3 B 30"x3 B 30x3 B 30"x3 B 30"x3 B 3x30' B          DKTC 30x w/SB 30x w/PB 30x w/PB 30x w/PB 30x w/PB 30X w/SB 30 w/SB          Seated lumbar flex/rot stretch 4' w/SB 4' w/PB 4' w/PB Next 4' w/PB 4' w/SB 3' w/SB 3' w/SB         TA 5"x20  5"x20 5"x20 5"x20 5"x20 5"x20 5"x20 5" x 20 5"x15 5" x 15 5" x 12 5" x 12 5" x 12 5"x10   TA w/ march 1' 1' 1' 1' 1' 1' 2 x 10 2 x 10         Bridges  3"x15 - -  -    -  -  - --   Prone press up  X 10 with DAT x1'                Prone glut set 5"x20                Cat/cow 5"x10                Cervical Agusto    - -  -    - - held - - 5"x5   Rot/sb   pec stretch in door  - -  -    -  nt 3 x 30" 3 x 30"     Scapular Ret oot 5"x12 5"x12 OOT 5"x12 5" x12 NT NT 5"x12 5"x 12 5" x 10 5" x 10 5" x 12 5"x10   Lats RTC 2x15 sit on SB RTC  2x12 RTC  2x12 OOT RTB 2x10  OOT 2x10 rtb -     -   Rows RTC 2x15   Sit on SB RTC  2x12 RTC  2x12 OOT RTB  2x10  OOT 2x10 rtb -     -   Horizontal Abd  - - -- -    -     -   Scaption  - - -- -    -     -   Wall Slides  - - -- -    -     -   Initials RB CS 2/6 CS 1/6 FS CS 2/6 DH 1/6 RS RS CK ED ED ED ED CS 1/6        Assessment:     Worked more on lower back today secondary to pt's " "complaints.  Patient tolerated treatment well but increased R lower back pain during LTR to the R.  Discontinued exercise, and instructed pt to only perform LTR to the L.  Pt "felt good" with DAT, however increased pain with prone press ups.  Added cat/cow to improve mobility.  After treatment, decreased c/o pain.    Patient Education/Response:     CONT HEP    Plans and Goals:     Continue c POC.     Short Term Goals (4  Weeks): 4/29/17 = Long Term Goals (8 Weeks): 5/29/17  1. Pt will report current pain </= 6/10 to improve quality of sleep.  2. Pt. Will report FOTO lumbar spine </= 20-40% limitation to increase ease of carrying/lifting at home during household chores.  3. Pt will demonstrate >/= 4+/5 B shoulder MMT in above tested areas to increase ease of carrying/lifting during household chores.   4. Pt will demonstrate B ASLR >/= 20 degrees to improve tolerance to stair negotiation.   5. Pt will be I c HEP to promote wellness at home.        "

## 2017-05-24 ENCOUNTER — CLINICAL SUPPORT (OUTPATIENT)
Dept: REHABILITATION | Facility: HOSPITAL | Age: 62
End: 2017-05-24
Attending: ORTHOPAEDIC SURGERY
Payer: MEDICARE

## 2017-05-24 DIAGNOSIS — M62.89 QUADRICEP TIGHTNESS: ICD-10-CM

## 2017-05-24 DIAGNOSIS — R29.898 DECREASED ROM OF NECK: ICD-10-CM

## 2017-05-24 DIAGNOSIS — M25.69 DECREASED ROM OF TRUNK AND BACK: ICD-10-CM

## 2017-05-24 DIAGNOSIS — M62.9 HAMSTRING TIGHTNESS OF BOTH LOWER EXTREMITIES: ICD-10-CM

## 2017-05-24 PROCEDURE — 97140 MANUAL THERAPY 1/> REGIONS: CPT | Mod: PN

## 2017-05-24 PROCEDURE — 97110 THERAPEUTIC EXERCISES: CPT | Mod: PN

## 2017-05-26 ENCOUNTER — CLINICAL SUPPORT (OUTPATIENT)
Dept: REHABILITATION | Facility: HOSPITAL | Age: 62
End: 2017-05-26
Attending: ORTHOPAEDIC SURGERY
Payer: MEDICARE

## 2017-05-26 DIAGNOSIS — M62.9 HAMSTRING TIGHTNESS OF BOTH LOWER EXTREMITIES: ICD-10-CM

## 2017-05-26 DIAGNOSIS — M62.89 QUADRICEP TIGHTNESS: ICD-10-CM

## 2017-05-26 DIAGNOSIS — M25.69 DECREASED ROM OF TRUNK AND BACK: ICD-10-CM

## 2017-05-26 DIAGNOSIS — R29.898 DECREASED ROM OF NECK: ICD-10-CM

## 2017-05-26 PROCEDURE — 97110 THERAPEUTIC EXERCISES: CPT | Mod: PN

## 2017-05-26 PROCEDURE — 97140 MANUAL THERAPY 1/> REGIONS: CPT | Mod: PN

## 2017-05-26 NOTE — PROGRESS NOTES
"TIME RECORD    Date:  05/26/2017    Start Time:  1005  Stop Time:  1100    PROCEDURES:    TIMED  Procedure Min.   Manual therapy 25   Therex 30         Total Timed Minutes:  55  Total Timed Units:  4  Total Untimed Units:  0  Charges Billed/# of units:  4  MTx2, TEx2      Progress/Current Status    Subjective:     Patient ID: Medhat Nunez is a 62 y.o. male.  Diagnosis:   1. Decreased ROM of neck     2. Decreased ROM of trunk and back     3. Quadricep tightness     4. Hamstring tightness of both lower extremities       Pain: 7-8 /10  Patient reports today his neck and his back are really bothering him.    Objective:     Pt received manual therapy in (L) SL for STM/MFR to (R) lumbar and thoracic paraspinal muscles and QL, QL stretch, to supine for STM with elongation to B Cervical paraspinals, gentle sub occipital release, manual stretch to B UT with STM/MFR to same, B upper thoracic release x 25'.  Pt participated in therex per log below 1:1 with PTA x 30 minutes  .   Date 5/26/17 5/17/17 5/12/17 5/10/17 5/3/17 5/1/17 4/28/17 4/26/17 4/21/17 4/19/17 4/12/17 4/10/17 4/7/17 4/5/17 3/31/17   FOTO   - - At d/c Done 10/10 9/10 8/10 7/10 6/10 issued 4/5 3/5 2/5   auth Visit   7/9/17 16/24 15/24 14/24 13/24 12/12  auth measures done 11/12 10/12 9/12 8/12 7/12 6/12 5/12 4/12 3/12 2 of 12   POC 5/29/17 5/29/17 5/29/17 5/29/17 5/29/17 5/29/17 5/29/17 5/29/17 5/29/17 5/29/17 5/29/17 5/29/17 5/29/17 5/29/17 5/29/17   G Code 6/10 5/10 4/10 3/10 2/10 1/10 10/10 9/10 8/10 7/10  5/10 4/10 3/10 2 of 10   Cap Visit  Cap total   123.68  1636.44 61.84  1512.76 125.80  1450.92 125.80  1325.12 125.80 61.84  1073.52   125.80  1011.68 61.84  885.88 125.80  824.04 93.82  698.24 125.80  604.42 93.82  478.62 123.68  384.80 93.82  261.12     91.70  167.30   [facility]   -  --     -  10' 10' 10' 10'   MT 25' 15' 25' 23' 20' 20' 20' 12' 15' 12' 15'  17' 25 20' 35'   UT Str. 30"x3 B 30"x3 B 30"x3 B 30"x3 B 30"x3 B 30"x3 B 30"x3 B NT NT 30"x3 B  NT " "Self 30" x 2 B 30" x 3 B - self MT   Levator Str 30"x3 B 30"x3 B                Pec Str.  oot 3' on    Foam roll 3' on foam roll 3' on foam roll 3' on foam roll 3' on foam roll NT NT 3' on foam roll 3' 3'  3' foam roll 3' full roll Over half roll 3'    Snow [place] on roll   - - -- NT NT NT NT held 25 x  20 x 20 x 20 x  x10   Flies on roll    - -- NT NT NT NT x10        Open book AROM 2x10 B oot 2x10 B 2x10 B 2x10 B 2x10 B 2x10 NT NT x15 B 12 B  12 B 12 B     Chin Tuck 5"x10 oot 5"x12 5"x12 5"x10 5"x10 5"x10   - -  held - 5" x 10 5"x10   EOM hip flexor stretch   -  --   1' B 1' B 1' 1' B  1' B      LTR 30"x3 B 30"x3 L only 30"x3 B 20"x3 B 20"x3 B 20"x3 B 20"x3 3x20" B 10x5" B 10"x5 B 10" x 5 B  10" x 5 B 10" x 5 B 10" x 5 B 5"x10   Hip ADD str 30"x3   supine 30"x3 supine 30"x3  supine 30"x3 supine 30"x3  supine 30"x3  supine 30"x3 supine 3x30" supine 2x30" supine standing 2x30" B 1' B  2 x 30" B      HS 90/90 flossing oot 30x B 30x B 30x B 30x B 30x B 30 B 20 B 20 B - 15 B  15 B 12 B     Piriformis stretch 30"x3 B 30"x3 B 30"x3 B 30"x3 B 30x3 B 30"x3 B 30"x3 B 3x30' B          DKTC 30x w/RPB 30x w/SB 30x w/PB 30x w/PB 30x w/PB 30x w/PB 30X w/SB 30 w/SB          Seated lumbar flex/rot stretch oot 4' w/SB 4' w/PB 4' w/PB Next 4' w/PB 4' w/SB 3' w/SB 3' w/SB         TA 10"x10 5"x20  5"x20 5"x20 5"x20 5"x20 5"x20 5"x20 5" x 20 5"x15 5" x 15 5" x 12 5" x 12 5" x 12 5"x10   TA w/ march 1' 1' 1' 1' 1' 1' 2 x 10 2 x 10         Bridges  3"x15 3"x15 - -  -    -  -  - --   Prone press up  x10 then  DAT x 1' X 10 with DAT x1'                Prone glut set 5"x20 5"x20                Cat/cow 5"x10 5"x10                Cervical Agusto   --  - -  -    - - held - - 5"x5   Rot/sb   pec stretch in door --  - -  -    -  nt 3 x 30" 3 x 30"     Scapular Ret -- oot 5"x12 5"x12 OOT 5"x12 5" x12 NT NT 5"x12 5"x 12 5" x 10 5" x 10 5" x 12 5"x10   Lats oot RTC 2x15 sit on SB RTC  2x12 RTC  2x12 OOT RTB 2x10  OOT 2x10 rtb -     -   Rows oot RTC " 2x15   Sit on SB RTC  2x12 RTC  2x12 OOT RTB  2x10  OOT 2x10 rtb -     -   Horizontal Abd --  - - -- -    -     -   Scaption --  - - -- -    -     -   Wall Slides --  - - -- -    -     -   Initials DH 1/6 RB CS 2/6 CS 1/6 FS CS 2/6 DH 1/6 RS RS CK ED ED ED ED CS 1/6          Assessment:     Patient required verbal instruction to perform therex in pain free range.  Able to complete all as noted. TE slightly limited due to increased time with MT today.  Reports some relief after treatment, not specific to scale.    Patient Education/Response:     Patient educated to continue HEP.  Verbalized understanding.    Plans and Goals:     Continue PT per POC.     Short Term Goals (4  Weeks): 4/29/17 = Long Term Goals (8 Weeks): 5/29/17  1. Pt will report current pain </= 6/10 to improve quality of sleep.  2. Pt. Will report FOTO lumbar spine </= 20-40% limitation to increase ease of carrying/lifting at home during household chores.  3. Pt will demonstrate >/= 4+/5 B shoulder MMT in above tested areas to increase ease of carrying/lifting during household chores.   4. Pt will demonstrate B ASLR >/= 20 degrees to improve tolerance to stair negotiation.   5. Pt will be I c HEP to promote wellness at home.

## 2017-06-02 ENCOUNTER — CLINICAL SUPPORT (OUTPATIENT)
Dept: REHABILITATION | Facility: HOSPITAL | Age: 62
End: 2017-06-02
Attending: ORTHOPAEDIC SURGERY
Payer: MEDICARE

## 2017-06-02 DIAGNOSIS — M62.9 HAMSTRING TIGHTNESS OF BOTH LOWER EXTREMITIES: ICD-10-CM

## 2017-06-02 DIAGNOSIS — M62.89 QUADRICEP TIGHTNESS: ICD-10-CM

## 2017-06-02 DIAGNOSIS — R29.898 DECREASED ROM OF NECK: ICD-10-CM

## 2017-06-02 DIAGNOSIS — M25.69 DECREASED ROM OF TRUNK AND BACK: ICD-10-CM

## 2017-06-02 NOTE — PROGRESS NOTES
TIME RECORD    Date:  06/06/2017    Start Time:  900  Stop Time:  1000    PROCEDURES:    TIMED  Procedure Min.   Manual therpay 20   Therex 10   Therex (supervised) 25         Total Timed Minutes:  30  Total Timed Units:  2  Total Untimed Units:  0  Charges Billed/# of units:  0 No charge      Progress/Current Status    Subjective:     Patient ID: Medhat Nunez is a 62 y.o. male.  Diagnosis:   1. Decreased ROM of neck     2. Decreased ROM of trunk and back     3. Quadricep tightness     4. Hamstring tightness of both lower extremities       Pain: 3-4/10  Patient states complaint of low back and neck pain today.    Objective:     Pt received manual therapy in (L) SL for STM/MFR to (R) lumbar and thoracic paraspinal muscles and QL, QL stretch, to supine for STM with elongation to B Cervical paraspinals, gentle sub occipital release, manual stretch to B UT with STM/MFR to same, B upper thoracic release x 20'.  Pt participated in therex per log below 1:1 with PTA x 10 minutes, additional 25 minutes with supervision.  .   Date 6/2/17 5/26/17 5/17/17 5/12/17 5/10/17 5/3/17 5/1/17 4/28/17 4/26/17 4/21/17 4/19/17 4/12/17 4/10/17 4/7/17 4/5/17 3/31/17   FOTO    - - At d/c Done 10/10 9/10 8/10 7/10 6/10 issued 4/5 3/5 2/5   auth Visit   7/9/17 17/24 16/24 15/24 14/24 13/24 12/12  auth measures done 11/12 10/12 9/12 8/12 7/12 6/12 5/12 4/12 3/12 2 of 12   POC  5/29/17 5/29/17 5/29/17 5/29/17 5/29/17 5/29/17 5/29/17 5/29/17 5/29/17 5/29/17 5/29/17 5/29/17 5/29/17 5/29/17 5/29/17   G Code 7/10 6/10 5/10 4/10 3/10 2/10 1/10 10/10 9/10 8/10 7/10  5/10 4/10 3/10 2 of 10   Cap Visit  Cap total --  1636.44   123.68  1636.44 61.84  1512.76 125.80  1450.92 125.80  1325.12 125.80 61.84  1073.52   125.80  1011.68 61.84  885.88 125.80  824.04 93.82  698.24 125.80  604.42 93.82  478.62 123.68  384.80 93.82  261.12     91.70  167.30   [facility]    -  --     -  10' 10' 10' 10'   MT 20 25' 15' 25' 23' 20' 20' 20' 12' 15' 12' 15'  17' 25 20' 35'  "  UT Str. 30"x3 B 30"x3 B 30"x3 B 30"x3 B 30"x3 B 30"x3 B 30"x3 B 30"x3 B NT NT 30"x3 B  NT Self 30" x 2 B 30" x 3 B - self MT   Levator Str 30"x3 B 30"x3 B 30"x3 B                Pec Str. --  oot 3' on    Foam roll 3' on foam roll 3' on foam roll 3' on foam roll 3' on foam roll NT NT 3' on foam roll 3' 3'  3' foam roll 3' full roll Over half roll 3'    [place] on roll --   - - -- NT NT NT NT held 25 x  20 x 20 x 20 x  x10   Flies on roll --    - -- NT NT NT NT x10        Open book AROM 2x10 B 2x10 B oot 2x10 B 2x10 B 2x10 B 2x10 B 2x10 NT NT x15 B 12 B  12 B 12 B     [place] Sit 5"x10 5"x10 oot 5"x12 5"x12 5"x10 5"x10 5"x10   - -  held - 5" x 10 5"x10   EOM hip flexor stretch    -  --   1' B 1' B 1' 1' B  1' B      LTR 30"x3 B 30"x3 B 30"x3 L only 30"x3 B 20"x3 B 20"x3 B 20"x3 B 20"x3 3x20" B 10x5" B 10"x5 B 10" x 5 B  10" x 5 B 10" x 5 B 10" x 5 B 5"x10   Hip ADD str NT 30"x3   supine 30"x3 supine 30"x3  supine 30"x3 supine 30"x3  supine 30"x3  supine 30"x3 supine 3x30" supine 2x30" supine standing 2x30" B 1' B  2 x 30" B      HS 90/90 flossing oot oot 30x B 30x B 30x B 30x B 30x B 30 B 20 B 20 B - 15 B  15 B 12 B     Piriformis stretch 30"x3 30"x3 B 30"x3 B 30"x3 B 30"x3 B 30x3 B 30"x3 B 30"x3 B 3x30' B          DKTC RPB 30x w/RPB 30x w/SB 30x w/PB 30x w/PB 30x w/PB 30x w/PB 30X w/SB 30 w/SB          Seated lumbar flex/rot stretch oot oot 4' w/SB 4' w/PB 4' w/PB Next 4' w/PB 4' w/SB 3' w/SB 3' w/SB         TA 10"x10 10"x10 5"x20  5"x20 5"x20 5"x20 5"x20 5"x20 5"x20 5" x 20 5"x15 5" x 15 5" x 12 5" x 12 5" x 12 5"x10   TA w/ march 1' 1' 1' 1' 1' 1' 2 x 10 2 x 10         Bridges  3"x15 3"x15 3"x15 - -  -    -  -  - --   Prone press up  x10 then  DAT x 1' x10 then  DAT x 1' X 10 with DAT x1'                Prone glut set 5"x20 5"x20 5"x20                Cat/cow 5"X10 5"x10 5"x10                Cervical Agusto   --- --  - -  -    - - held - - 5"x5   Rot/sb   pec stretch in door -- --  - -  -    -  nt 3 x 30" 3 " "x 30"     Scapular Ret  -- oot 5"x12 5"x12 OOT 5"x12 5" x12 NT NT 5"x12 5"x 12 5" x 10 5" x 10 5" x 12 5"x10   Lats rtc 2X15 0N sb oot RTC 2x15 sit on SB RTC  2x12 RTC  2x12 OOT RTB 2x10  OOT 2x10 rtb -     -   Rows RTC 2x15  On SB oot RTC 2x15   Sit on SB RTC  2x12 RTC  2x12 OOT RTB  2x10  OOT 2x10 rtb -     -   Horizontal Abd -- --  - - -- -    -     -   Scaption -- --  - - -- -    -     -   Wall Slides -- --  - - -- -    -     -   Initials DH 2/6 DH 1/6 RB CS 2/6 CS 1/6 FS CS 2/6 DH 1/6 RS RS CK ED ED ED ED CS 1/6     Assessment:    Patient able to tolerate manual therapy with minimal facial grimaces, myofascial restrictions noted R sacral border.  Able to tolerate therex with reports of "better now" after treatment.    Patient Education/Response:     Patient educated to continue HEP.  Verbalized understanding.    Plans and Goals:     Continue PT per POC, progress as able.    Short Term Goals (4  Weeks): 4/29/17 = Long Term Goals (8 Weeks): 5/29/17  1. Pt will report current pain </= 6/10 to improve quality of sleep.  2. Pt. Will report FOTO lumbar spine </= 20-40% limitation to increase ease of carrying/lifting at home during household chores.  3. Pt will demonstrate >/= 4+/5 B shoulder MMT in above tested areas to increase ease of carrying/lifting during household chores.   4. Pt will demonstrate B ASLR >/= 20 degrees to improve tolerance to stair negotiation.   5. Pt will be I c HEP to promote wellness at home.        "

## 2017-06-07 ENCOUNTER — CLINICAL SUPPORT (OUTPATIENT)
Dept: REHABILITATION | Facility: HOSPITAL | Age: 62
End: 2017-06-07
Attending: ORTHOPAEDIC SURGERY
Payer: MEDICARE

## 2017-06-07 DIAGNOSIS — M62.9 HAMSTRING TIGHTNESS OF BOTH LOWER EXTREMITIES: ICD-10-CM

## 2017-06-07 DIAGNOSIS — M25.69 DECREASED ROM OF TRUNK AND BACK: ICD-10-CM

## 2017-06-07 DIAGNOSIS — R29.898 DECREASED ROM OF NECK: ICD-10-CM

## 2017-06-07 DIAGNOSIS — M62.89 QUADRICEP TIGHTNESS: ICD-10-CM

## 2017-06-07 PROCEDURE — 97140 MANUAL THERAPY 1/> REGIONS: CPT | Mod: PN

## 2017-06-07 PROCEDURE — 97110 THERAPEUTIC EXERCISES: CPT | Mod: PN

## 2017-06-07 PROCEDURE — G8978 MOBILITY CURRENT STATUS: HCPCS | Mod: CL,PN

## 2017-06-07 PROCEDURE — G8979 MOBILITY GOAL STATUS: HCPCS | Mod: CJ,PN

## 2017-06-07 NOTE — PLAN OF CARE
"TIME RECORD    Date: 06/07/2017    Start Time:  ***  Stop Time:  ***    PROCEDURES:    TIMED  Procedure Time Min.    Start:  Stop:     Start:  Stop:     Start:  Stop:     Start:  Stop:          UNTIMED  Procedure Time Min.    Start:  Stop:     Start:  Stop:      Total Timed Minutes:  ***  Total Timed Units:  ***  Total Untimed Units:  ***  Charges Billed/# of units:  ***    PHYSICAL THERAPY UPDATED PLAN OF TREATMENT    Patient name: Medhat Nunez  Onset Date:  chronic  SOC Date:  329/2017  Primary Diagnosis:    1. Decreased ROM of neck     2. Decreased ROM of trunk and back     3. Quadricep tightness     4. Hamstring tightness of both lower extremities       Treatment Diagnosis:  Cervical and lumbar spine pain, bilateral shoulder impingement   Certification Period:  5/29/2017 to 6/29/2017  Precautions:  standard  Visits from SOC:  19  Functional Level Prior to SOC:  independent    Subjective: ***    Objective: ***    Date 6/7/17 6/2/17 5/26/17 5/17/17 5/12/17 5/10/17 5/3/17 5/1/17 4/28/17 4/26/17 4/21/17 4/19/17 4/12/17 4/10/17 4/7/17 4/5/17 3/31/17   FOTO     - - At d/c Done 10/10 9/10 8/10 7/10 6/10 issued 4/5 3/5 2/5   auth Visit   7/9/17 18/24 17/24 16/24 15/24 14/24 13/24 12/12  auth measures done 11/12 10/12 9/12 8/12 7/12 6/12 5/12 4/12 3/12 2 of 12   POC 6/29/17 5/29/17 5/29/17 5/29/17 5/29/17 5/29/17 5/29/17 5/29/17 5/29/17 5/29/17 5/29/17 5/29/17 5/29/17 5/29/17 5/29/17 5/29/17   G Code 1/10 7/10 6/10 5/10 4/10 3/10 2/10 1/10 10/10 9/10 8/10 7/10  5/10 4/10 3/10 2 of 10   Cap Visit  Cap total ***  *** --  1636.44   123.68  1636.44 61.84  1512.76 125.80  1450.92 125.80  1325.12 125.80 61.84  1073.52   125.80  1011.68 61.84  885.88 125.80  824.04 93.82  698.24 125.80  604.42 93.82  478.62 123.68  384.80 93.82  261.12     91.70  167.30   [facility]     -  --     -  10' 10' 10' 10'   MT  20 25' 15' 25' 23' 20' 20' 20' 12' 15' 12' 15'  17' 25 20' 35'   UT Str.  30"x3 B 30"x3 B 30"x3 B 30"x3 B 30"x3 B 30"x3 B 30"x3 " "B 30"x3 B NT NT 30"x3 B  NT Self 30" x 2 B 30" x 3 B - self MT   Levator Str  30"x3 B 30"x3 B 30"x3 B                Pec Str.  --  oot 3' on    Foam roll 3' on foam roll 3' on foam roll 3' on foam roll 3' on foam roll NT NT 3' on foam roll 3' 3'  3' foam roll 3' full roll Over half roll 3'    Snow Zinc on roll  --   - - -- NT NT NT NT held 25 x  20 x 20 x 20 x  x10   Flies on roll  --    - -- NT NT NT NT x10        Open book AROM  2x10 B 2x10 B oot 2x10 B 2x10 B 2x10 B 2x10 B 2x10 NT NT x15 B 12 B  12 B 12 B     Chin Tuck  Sit 5"x10 5"x10 oot 5"x12 5"x12 5"x10 5"x10 5"x10   - -  held - 5" x 10 5"x10   EOM hip flexor stretch     -  --   1' B 1' B 1' 1' B  1' B      LTR  30"x3 B 30"x3 B 30"x3 L only 30"x3 B 20"x3 B 20"x3 B 20"x3 B 20"x3 3x20" B 10x5" B 10"x5 B 10" x 5 B  10" x 5 B 10" x 5 B 10" x 5 B 5"x10   Hip ADD str  NT 30"x3   supine 30"x3 supine 30"x3  supine 30"x3 supine 30"x3  supine 30"x3  supine 30"x3 supine 3x30" supine 2x30" supine standing 2x30" B 1' B  2 x 30" B      HS 90/90 flossing  oot oot 30x B 30x B 30x B 30x B 30x B 30 B 20 B 20 B - 15 B  15 B 12 B     Piriformis stretch  30"x3 30"x3 B 30"x3 B 30"x3 B 30"x3 B 30x3 B 30"x3 B 30"x3 B 3x30' B          DKTC  RPB 30x w/RPB 30x w/SB 30x w/PB 30x w/PB 30x w/PB 30x w/PB 30X w/SB 30 w/SB          Seated lumbar flex/rot stretch  oot oot 4' w/SB 4' w/PB 4' w/PB Next 4' w/PB 4' w/SB 3' w/SB 3' w/SB         TA  10"x10 10"x10 5"x20  5"x20 5"x20 5"x20 5"x20 5"x20 5"x20 5" x 20 5"x15 5" x 15 5" x 12 5" x 12 5" x 12 5"x10   TA w/ march 1' 1' 1' 1' 1' 1' 2 x 10 2 x 10         Bridges   3"x15 3"x15 3"x15 - -  -    -  -  - --   Prone press up   x10 then  DAT x 1' x10 then  DAT x 1' X 10 with DAT x1'                Prone glut set  5"x20 5"x20 5"x20                Cat/cow  5"X10 5"x10 5"x10                Cervical Agusto    --- --  - -  -    - - held - - 5"x5   Rot/sb   pec stretch in door  -- --  - -  -    -  nt 3 x 30" 3 x 30"     Scapular Ret   -- oot 5"x12 5"x12 " "OOT 5"x12 5" x12 NT NT 5"x12 5"x 12 5" x 10 5" x 10 5" x 12 5"x10   Lats  rtc 2X15 0N sb oot RTC 2x15 sit on SB RTC  2x12 RTC  2x12 OOT RTB 2x10  OOT 2x10 rtb -     -   Rows  RTC 2x15  On SB oot RTC 2x15   Sit on SB RTC  2x12 RTC  2x12 OOT RTB  2x10  OOT 2x10 rtb -     -   Horizontal Abd  -- --  - - -- -    -     -   Scaption  -- --  - - -- -    -     -   Wall Slides  -- --  - - -- -    -     -   Initials MNB DH 2/6 DH 1/6 RB CS 2/6 CS 1/6 FS CS 2/6 DH 1/6 RS RS CK ED ED ED ED CS 1/6       Updated Assessment:  ***        Short Term Goals (4  Weeks): 4/29/17 = Long Term Goals (8 Weeks): 5/29/17  1. Pt will report current pain </= 6/10 to improve quality of sleep.  2. Pt. Will report FOTO lumbar spine </= 20-40% limitation to increase ease of carrying/lifting at home during household chores.  3. Pt will demonstrate >/= 4+/5 B shoulder MMT in above tested areas to increase ease of carrying/lifting during household chores.   4. Pt will demonstrate B ASLR >/= 20 degrees to improve tolerance to stair negotiation.   5. Pt will be I c HEP to promote wellness at home.    Reasons for Recertification of Therapy:   Patient required updated POC    Certification Period: 05/29/2017 to 06/29/2017  Recommended Treatment Plan: *** times per week for *** weeks: {[place] PLAN:21542}  Other Recommendations: ***    Therapist's Name: Reshma Gautam, PT, DPT   Date: 06/07/2017    I CERTIFY THE NEED FOR THESE SERVICES FURNISHED UNDER THIS PLAN OF TREATMENT AND WHILE UNDER MY CARE    Physician's comments: ________________________________________________________________________________________________________________________________________________      Physician's Name: ___________________________________  "

## 2017-06-07 NOTE — PLAN OF CARE
TIME RECORD    Date: 06/07/2017    Start Time:  09:50 am   Stop Time:  10:45 am     PROCEDURES:    TIMED  Procedure Time Min.   MT Start:09:50 am   Stop:10:05 am  15   TE Start:10:05 am   Stop:10:45 am  40      Total Timed Minutes:  55  Total Timed Units:  4  Total Untimed Units:  0  Charges Billed/# of units:  1 MT, 3 TE    PHYSICAL THERAPY UPDATED PLAN OF TREATMENT    Patient name: Medhat Nunez  Onset Date:  chronic  SOC Date:  329/2017  Primary Diagnosis:    1. Decreased ROM of neck     2. Decreased ROM of trunk and back     3. Quadricep tightness     4. Hamstring tightness of both lower extremities       Treatment Diagnosis:  Cervical and lumbar spine pain, bilateral shoulder impingement   Certification Period:  5/29/2017 to 6/29/2017  Precautions:  standard  Visits from SOC:  19  Functional Level Prior to SOC:  independent    Subjective: Patient reports 4/10 pain at right low back primarily with patient stating overall less pain since starting therapy but still having difficulty putting shoes on and when turning to look while driving in car. Patient has no follow up appointment scheduled with MD at this time. Patient would like to finish the approved visits for physical therapy.     Objective: Patient received STM/MFR to right thoracolumbar PSM and QL musculature while lying in left sidelying with pillow between bilateral LE's as well as manual stretching to right QL musculature. Then, patient completed therex per log as below 1:1 with PT x 40 minutes with patient able to complete without any complaints of increase in pain however increase stretch and pulling noticed at right low back with most right trunk rotation therex. PT reviewed patient to complete all exercises in pain free range with patient demonstrating understanding. Reassessment and updated POC completed.     Date 6/7/17 6/2/17 5/26/17 5/17/17 5/12/17 5/10/17 5/3/17 5/1/17 4/28/17 4/26/17 4/21/17 4/19/17 4/12/17 4/10/17 4/7/17 4/5/17 3/31/17  "  FOTO     - - At d/c Done 10/10 9/10 8/10 7/10 6/10 issued 4/5 3/5 2/5   auth Visit   7/9/17 18/24 17/24 16/24 15/24 14/24 13/24 12/12  auth measures done 11/12 10/12 9/12 8/12 7/12 6/12 5/12 4/12 3/12 2 of 12   POC 6/29/17  5/29/17 5/29/17 5/29/17 5/29/17 5/29/17 5/29/17 5/29/17 5/29/17 5/29/17 5/29/17 5/29/17 5/29/17 5/29/17 5/29/17 5/29/17   G Code 1/10 7/10 6/10 5/10 4/10 3/10 2/10 1/10 10/10 9/10 8/10 7/10  5/10 4/10 3/10 2 of 10   Cap Visit  Cap total 125.80  1762.24 --  1636.44   123.68  1636.44 61.84  1512.76 125.80  1450.92 125.80  1325.12 125.80 61.84  1073.52   125.80  1011.68 61.84  885.88 125.80  824.04 93.82  698.24 125.80  604.42 93.82  478.62 123.68  384.80 93.82  261.12     91.70  167.30   [facility] --    -  --     -  10' 10' 10' 10'   MT 15' 20 25' 15' 25' 23' 20' 20' 20' 12' 15' 12' 15'  17' 25 20' 35'   UT Str. 30"x3 B 30"x3 B 30"x3 B 30"x3 B 30"x3 B 30"x3 B 30"x3 B 30"x3 B 30"x3 B NT NT 30"x3 B  NT Self 30" x 2 B 30" x 3 B - self MT   Levator Str 30"x3 B 30"x3 B 30"x3 B 30"x3 B                Open book AROM 2x10 B 2x10 B 2x10 B oot 2x10 B 2x10 B 2x10 B 2x10 B 2x10 NT NT x15 B 12 B  12 B 12 B     Chin Tuck Sit 5"x10 Sit 5"x10 5"x10 oot 5"x12 5"x12 5"x10 5"x10 5"x10   - -  held - 5" x 10 5"x10   LTR 30"x3 B 30"x3 B 30"x3 B 30"x3 L only 30"x3 B 20"x3 B 20"x3 B 20"x3 B 20"x3 3x20" B 10x5" B 10"x5 B 10" x 5 B  10" x 5 B 10" x 5 B 10" x 5 B 5"x10   LTR w/theraball 5"x10                   Hip ADD str -- NT 30"x3   supine 30"x3 supine 30"x3  supine 30"x3 supine 30"x3  supine 30"x3  supine 30"x3 supine 3x30" supine 2x30" supine standing 2x30" B 1' B  2 x 30" B      HS 90/90 flossing -- oot oot 30x B 30x B 30x B 30x B 30x B 30 B 20 B 20 B - 15 B  15 B 12 B     Piriformis stretch 30"x3 30"x3 30"x3 B 30"x3 B 30"x3 B 30"x3 B 30x3 B 30"x3 B 30"x3 B 3x30' B          DKTC -- RPB 30x w/RPB 30x w/SB 30x w/PB 30x w/PB 30x w/PB 30x w/PB 30X w/SB 30 w/SB          Seated lumbar flex/rot stretch -- oot oot 4' w/SB 4' w/PB " "4' w/PB Next 4' w/PB 4' w/SB 3' w/SB 3' w/SB         TA 10"x10 10"x10 10"x10 5"x20  5"x20 5"x20 5"x20 5"x20 5"x20 5"x20 5" x 20 5"x15 5" x 15 5" x 12 5" x 12 5" x 12 5"x10   TA w/ march 1' 1' 1' 1' 1' 1' 1' 2 x 10 2 x 10         Bridges  On theraball  2x10 3"x15 3"x15 3"x15 - -  -    -  -  - --   Prone press up  -- x10 then  DAT x 1' x10 then  DAT x 1' X 10 with DAT x1'                Prone glut set -- 5"x20 5"x20 5"x20                Cat/cow -- 5"X10 5"x10 5"x10                Lats RTB 2x10 Theraball rtc 2X15 0N sb oot RTC 2x15 sit on SB RTC  2x12 RTC  2x12 OOT RTB 2x10  OOT 2x10 rtb -     -   Rows RTB 2x10 Theraball RTC 2x15  On SB oot RTC 2x15   Sit on SB RTC  2x12 RTC  2x12 OOT RTB  2x10  OOT 2x10 rtb -     -   UBE 2'/2' 120  Fwd/bwd                   Horizontal Abd -- -- --  - - -- -    -     -   Scaption -- -- --  - - -- -    -     -   Wall Slides -- -- --  - - -- -    -     -   Initials MNB DH 2/6 DH 1/6 RB CS 2/6 CS 1/6 FS CS 2/6 DH 1/6 RS RS CK ED ED ED ED CS 1/6     Bilateral UE MMT grossly 4+/5     Updated Assessment: Patient has progressed well with physical therapy services with improved lumbar and cervical spine active range of motion however still limited especially with right lumbar spine rotation with increased reports of tightness and some pain. Patient with overall less tissue tension to palpation however still present at right lumbar spine with increased tenderness. Patient with score of 67% on Lumbar spine Foto placing patient in 60-80% impaired, limited, or restricted category placing patient in higher level of disability compared to at initial evaluation. PT to focus on maximizing independence with HEP, decrease manual therapy, and promote independence with strengthening and stretching therex.  This patient would benefit from continued skilled PT services 1-2 visits a week x 4 weeks to maximize patient's independence with HEP, improve patient's ability to complete functional mobility and " ADLs, increase strength, decrease pain, and promote good postural awareness.     Short Term Goals (4  Weeks): 4/29/17 = Long Term Goals (8 Weeks): 5/29/17  1. Pt will report current pain </= 6/10 to improve quality of sleep.  MET 6/7/17  2. Pt. Will report FOTO lumbar spine </= 20-40% limitation to increase ease of carrying/lifting at home during household chores. Ongoing as of 6/7/17  3. Pt will demonstrate >/= 4+/5 B shoulder MMT in above tested areas to increase ease of carrying/lifting during household chores.  Met 6/7/17  4. Pt will demonstrate B ASLR >/= 20 degrees to improve tolerance to stair negotiation. Ongoing as of 6/7/17  5. Pt will be I c HEP to promote wellness at home. Ongoing as of 6/7/17    New Long Term goals (4 weeks) 6/29/17  1. Patient will be able to report improved ability to put shoes/sock on without any complaints of increase in low back pain 75% of the time.   2. Patient will be able to drive and complete all necessary movements with driving including turning without any complaints of increase in pain 75% of the time.   3. Patient will be able to improve FOTO lumbar spine score of </= 20-40% limitation to increase ease of carrying/lifting at home during household chores.   4. There will be less tissue tension upon palpation throughout lumbar spine musculature.     Reasons for Recertification of Therapy:   Patient required updated POC    Certification Period: 05/29/2017 to 06/29/2017  Recommended Treatment Plan: 1-2 times per week for 4 weeks: Cervical/Lumbar Traction, Electrical Stimulation PRN, Group Therapy, Manual Therapy, Moist Heat/ Ice, Neuromuscular Re-ed, Patient Education, Self Care, Therapeutic Activites and Therapeutic Exercise  Other Recommendations: functional dry needling PRN, kinesiotape PRN     Therapist's Name: Reshma Gautam, PT, DPT   Date: 06/07/2017    I CERTIFY THE NEED FOR THESE SERVICES FURNISHED UNDER THIS PLAN OF TREATMENT AND WHILE UNDER MY CARE    Physician's  comments: ________________________________________________________________________________________________________________________________________________      Physician's Name: ___________________________________

## 2017-06-09 ENCOUNTER — CLINICAL SUPPORT (OUTPATIENT)
Dept: REHABILITATION | Facility: HOSPITAL | Age: 62
End: 2017-06-09
Attending: ORTHOPAEDIC SURGERY
Payer: MEDICARE

## 2017-06-09 DIAGNOSIS — M62.9 HAMSTRING TIGHTNESS OF BOTH LOWER EXTREMITIES: ICD-10-CM

## 2017-06-09 DIAGNOSIS — M25.69 DECREASED ROM OF TRUNK AND BACK: ICD-10-CM

## 2017-06-09 DIAGNOSIS — M62.89 QUADRICEP TIGHTNESS: ICD-10-CM

## 2017-06-09 DIAGNOSIS — R29.898 DECREASED ROM OF NECK: ICD-10-CM

## 2017-06-09 PROCEDURE — 97110 THERAPEUTIC EXERCISES: CPT | Mod: PN

## 2017-06-09 PROCEDURE — 97140 MANUAL THERAPY 1/> REGIONS: CPT | Mod: PN

## 2017-06-09 NOTE — PROGRESS NOTES
"TIME RECORD    Date:  06/09/2017    Start Time:  8:05  Stop Time:  9:00    PROCEDURES:    TIMED  Procedure Min.   MT 15   TE 40                 UNTIMED  Procedure Min.             Total Timed Minutes:  55  Total Timed Units:  4  Total Untimed Units:  0  Charges Billed/# of units:  3TE, 1 MT      Progress/Current Status    Subjective:     Patient ID: Medhat Nunez is a 62 y.o. male.  Diagnosis:   1. Decreased ROM of neck     2. Decreased ROM of trunk and back     3. Quadricep tightness     4. Hamstring tightness of both lower extremities       Pain: pt states he has some  Objective:     Patient initiated session with 15 min of STM/MFR to right thoracolumbar PSM and QL musculature in left sidelying with pillow between bilateral LE's as well as manual stretching to right QL musculature. Then, patient completed therex per log as below 1:1 with PTA x 40 minutes total.   Date 6/9/17 6/7/17 6/2/17 5/26/17 5/17/17 5/12/17 5/10/17 5/3/17 5/1/17 4/28/17 4/26/17 4/21/17 4/19/17 4/12/17 4/10/17 4/7/17 4/5/17 3/31/17   FOTO          - - At d/c Done 10/10 9/10 8/10 7/10 6/10 issued 4/5 3/5 2/5   auth Visit   7/9/17 19/24 18/24 17/24 16/24 15/24 14/24 13/24 12/12  auth measures done 11/12 10/12 9/12 8/12 7/12 6/12 5/12 4/12 3/12 2 of 12   POC 6/29/17 6/29/17 5/29/17 5/29/17 5/29/17 5/29/17 5/29/17 5/29/17 5/29/17 5/29/17 5/29/17 5/29/17 5/29/17 5/29/17 5/29/17 5/29/17 5/29/17   G Code 2/10 1/10 7/10 6/10 5/10 4/10 3/10 2/10 1/10 10/10 9/10 8/10 7/10   5/10 4/10 3/10 2 of 10   Cap Visit  Cap total 125.80  1888.04 125.80  1762.24 --  1636.44   123.68  1636.44 61.84  1512.76 125.80  1450.92 125.80  1325.12 125.80 61.84  1073.52   125.80  1011.68 61.84  885.88 125.80  824.04 93.82  698.24 125.80  604.42 93.82  478.62 123.68  384.80 93.82  261.12   91.70  167.30   [facility] - --       -   --         -   10' 10' 10' 10'   MT  15' 20 25' 15' 25' 23' 20' 20' 20' 12' 15' 12' 15'  17' 25 20' 35'   UT Str. MT 30"x3 B 30"x3 B 30"x3 B 30"x3 B " "30"x3 B 30"x3 B 30"x3 B 30"x3 B 30"x3 B NT NT 30"x3 B   NT Self 30" x 2 B 30" x 3 B - self MT   Levator Str MT 30"x3 B 30"x3 B 30"x3 B 30"x3 B                             Open book AROM 2x10 B 2x10 B 2x10 B 2x10 B oot 2x10 B 2x10 B 2x10 B 2x10 B 2x10 NT NT x15 B 12 B  12 B 12 B       Chin Tuck Sit 5"x10 Sit 5"x10 Sit 5"x10 5"x10 oot 5"x12 5"x12 5"x10 5"x10 5"x10     - -  held - 5" x 10 5"x10   LTR 30"x3 B 30"x3 B 30"x3 B 30"x3 B 30"x3 L only 30"x3 B 20"x3 B 20"x3 B 20"x3 B 20"x3 3x20" B 10x5" B 10"x5 B 10" x 5 B  10" x 5 B 10" x 5 B 10" x 5 B 5"x10   LTR w/theraball 5"x10 5"x10                                   Hip ADD str - -- NT 30"x3   supine 30"x3 supine 30"x3  supine 30"x3 supine 30"x3  supine 30"x3  supine 30"x3 supine 3x30" supine 2x30" supine standing 2x30" B 1' B  2 x 30" B         HS 90/90 flossing - -- oot oot 30x B 30x B 30x B 30x B 30x B 30 B 20 B 20 B - 15 B  15 B 12 B       Piriformis stretch 30"x3 30"x3 30"x3 30"x3 B 30"x3 B 30"x3 B 30"x3 B 30x3 B 30"x3 B 30"x3 B 3x30' B                 DKTC - -- RPB 30x w/RPB 30x w/SB 30x w/PB 30x w/PB 30x w/PB 30x w/PB 30X w/SB 30 w/SB                 Seated lumbar flex/rot stretch - -- oot oot 4' w/SB 4' w/PB 4' w/PB Next 4' w/PB 4' w/SB 3' w/SB 3' w/SB               TA 10"x10 10"x10 10"x10 10"x10 5"x20  5"x20 5"x20 5"x20 5"x20 5"x20 5"x20 5" x 20 5"x15 5" x 15 5" x 12 5" x 12 5" x 12 5"x10   TA w/ march 1' 1' 1' 1' 1' 1' 1' 2 x 10 2 x 10               Bridges  On theraball  2x10 On theraball  2x10 3"x15 3"x15 3"x15 - -   -       -   -   - --   Prone press up  - -- x10 then  DAT x 1' x10 then  DAT x 1' X 10 with DAT x1'                             Prone glut set - -- 5"x20 5"x20 5"x20                             Cat/cow - -- 5"X10 5"x10 5"x10                             Lats RTB 2x10 Theraball RTB 2x10 Theraball rtc 2X15 0N sb oot RTC 2x15 sit on SB RTC  2x12 RTC  2x12 OOT RTB 2x10   OOT 2x10 rtb -         -   Rows RTB 2x10 Theraball RTB 2x10 Theraball RTC " 2x15  On SB oot RTC 2x15   Sit on SB RTC  2x12 RTC  2x12 OOT RTB  2x10   OOT 2x10 rtb -         -   UBE NT 2'/2' 120  Fwd/bwd                                   Horizontal Abd - -- -- --   - - -- -       -         -   Scaption - -- -- --   - - -- -       -         -   Wall Slides - -- -- --   - - -- -       -         -   Initials JA 1/6 MNB DH 2/6 DH 1/6 RB CS 2/6 CS 1/6 FS CS 2/6 DH 1/6 RS RS CK ED ED ED ED CS 1/6          Assessment:   Pt reports no new complaints of pain post session today. Minimal manual provided today in attempt decrease.     Patient Education/Response:     Cont HEP    Plans and Goals:     Cont to progress PT as able  Short Term Goals (4  Weeks): 4/29/17 = Long Term Goals (8 Weeks): 5/29/17  1. Pt will report current pain </= 6/10 to improve quality of sleep.  MET 6/7/17  2. Pt. Will report FOTO lumbar spine </= 20-40% limitation to increase ease of carrying/lifting at home during household chores. Ongoing as of 6/7/17  3. Pt will demonstrate >/= 4+/5 B shoulder MMT in above tested areas to increase ease of carrying/lifting during household chores.  Met 6/7/17  4. Pt will demonstrate B ASLR >/= 20 degrees to improve tolerance to stair negotiation. Ongoing as of 6/7/17  5. Pt will be I c HEP to promote wellness at home. Ongoing as of 6/7/17     New Long Term goals (4 weeks) 6/29/17  1. Patient will be able to report improved ability to put shoes/sock on without any complaints of increase in low back pain 75% of the time.   2. Patient will be able to drive and complete all necessary movements with driving including turning without any complaints of increase in pain 75% of the time.   3. Patient will be able to improve FOTO lumbar spine score of </= 20-40% limitation to increase ease of carrying/lifting at home during household chores.   4. There will be less tissue tension upon palpation throughout lumbar spine musculature.

## 2017-06-14 ENCOUNTER — CLINICAL SUPPORT (OUTPATIENT)
Dept: REHABILITATION | Facility: HOSPITAL | Age: 62
End: 2017-06-14
Attending: ORTHOPAEDIC SURGERY
Payer: MEDICARE

## 2017-06-14 DIAGNOSIS — M62.9 HAMSTRING TIGHTNESS OF BOTH LOWER EXTREMITIES: ICD-10-CM

## 2017-06-14 DIAGNOSIS — R29.898 DECREASED ROM OF NECK: ICD-10-CM

## 2017-06-14 DIAGNOSIS — M62.89 QUADRICEP TIGHTNESS: ICD-10-CM

## 2017-06-14 DIAGNOSIS — M25.69 DECREASED ROM OF TRUNK AND BACK: ICD-10-CM

## 2017-06-14 PROCEDURE — 97110 THERAPEUTIC EXERCISES: CPT | Mod: KX,PN

## 2017-06-14 PROCEDURE — 97140 MANUAL THERAPY 1/> REGIONS: CPT | Mod: KX,PN

## 2017-06-14 NOTE — PROGRESS NOTES
"TIME RECORD    Date:  06/14/2017    Start Time:  900  Stop Time:  1000    PROCEDURES:    TIMED  Procedure Min.   Manual therapy 15   Therex 20   Therex (supervised) 25               Total Timed Minutes:  35  Total Timed Units:  2  Total Untimed Units:  0  Charges Billed/# of units:  2  MTx1, TEx1      Progress/Current Status    Subjective:     Patient ID: Medhat Nunez is a 62 y.o. male.  Diagnosis:   1. Decreased ROM of neck     2. Decreased ROM of trunk and back     3. Quadricep tightness     4. Hamstring tightness of both lower extremities       Pain: 4 /10  States pain low back and neck "the same"    Objective:     Patient initiated session with 15 min of STM/MFR to right thoracolumbar PSM and QL musculature in left sidelying with pillow between bilateral LE's as well as manual stretching to right QL musculature.  To supine for STM to cervical paraspinals with elongation, B UT stretch with STM/MFR to same. Patient completed therex per log as below 1:1 with PTA x 20  minutes, additional 25 minutes with supervision.     Date 6/14/17 6/9/17 6/7/17 6/2/17 5/26/17 5/17/17 5/12/17 5/10/17 5/3/17 5/1/17 4/28/17 4/26/17 4/21/17 4/19/17 4/12/17 4/10/17 4/7/17 4/5/17 3/31/17   FOTO           - - At d/c Done 10/10 9/10 8/10 7/10 6/10 issued 4/5 3/5 2/5   auth Visit   7/9/17 20/24 19/24 18/24 17/24 16/24 15/24 14/24 13/24 12/12  auth measures done 11/12 10/12 9/12 8/12 7/12 6/12 5/12 4/12 3/12 2 of 12   POC 6/29/17 6/29/17 6/29/17 5/29/17 5/29/17 5/29/17 5/29/17 5/29/17 5/29/17 5/29/17 5/29/17 5/29/17 5/29/17 5/29/17 5/29/17 5/29/17 5/29/17 5/29/17   G Code 3/10 2/10 1/10 7/10 6/10 5/10 4/10 3/10 2/10 1/10 10/10 9/10 8/10 7/10   5/10 4/10 3/10 2 of 10   Cap Visit  Cap total  61.84KX  1949.88 125.80  1888.04 125.80  1762.24 --  1636.44   123.68  1636.44 61.84  1512.76 125.80  1450.92 125.80  1325.12 125.80 61.84  1073.52   125.80  1011.68 61.84  885.88 125.80  824.04 93.82  698.24 125.80  604.42 93.82  478.62 " "123.68  384.80 93.82  261.12   91.70  167.30   MHP  - --       -   --         -   10' 10' 10' 10'   MT 15'  15' 20 25' 15' 25' 23' 20' 20' 20' 12' 15' 12' 15'  17' 25 20' 35'   UT Str. MT/self MT 30"x3 B 30"x3 B 30"x3 B 30"x3 B 30"x3 B 30"x3 B 30"x3 B 30"x3 B 30"x3 B NT NT 30"x3 B   NT Self 30" x 2 B 30" x 3 B - self MT   Levator Str 30"x3 MT 30"x3 B 30"x3 B 30"x3 B 30"x3 B                             Open book AROM 2x10 B 2x10 B 2x10 B 2x10 B 2x10 B oot 2x10 B 2x10 B 2x10 B 2x10 B 2x10 NT NT x15 B 12 B  12 B 12 B       Chin Tuck Sit 5"x10 Sit 5"x10 Sit 5"x10 Sit 5"x10 5"x10 oot 5"x12 5"x12 5"x10 5"x10 5"x10     - -  held - 5" x 10 5"x10   LTR 30"x3  30"x3 B 30"x3 B 30"x3 B 30"x3 B 30"x3 L only 30"x3 B 20"x3 B 20"x3 B 20"x3 B 20"x3 3x20" B 10x5" B 10"x5 B 10" x 5 B  10" x 5 B 10" x 5 B 10" x 5 B 5"x10   LTR w/theraball 5"x10 5"x10 5"x10                                   Hip ADD str -- - -- NT 30"x3   supine 30"x3 supine 30"x3  supine 30"x3 supine 30"x3  supine 30"x3  supine 30"x3 supine 3x30" supine 2x30" supine standing 2x30" B 1' B  2 x 30" B         HS 90/90 flossing  - -- oot oot 30x B 30x B 30x B 30x B 30x B 30 B 20 B 20 B - 15 B  15 B 12 B       Piriformis stretch 30"x3 30"x3 30"x3 30"x3 30"x3 B 30"x3 B 30"x3 B 30"x3 B 30x3 B 30"x3 B 30"x3 B 3x30' B                 DKTC RPB x 1' - -- RPB 30x w/RPB 30x w/SB 30x w/PB 30x w/PB 30x w/PB 30x w/PB 30X w/SB 30 w/SB                 Seated lumbar flex/rot stretch  - -- oot oot 4' w/SB 4' w/PB 4' w/PB Next 4' w/PB 4' w/SB 3' w/SB 3' w/SB               TA 10"x10 10"x10 10"x10 10"x10 10"x10 5"x20  5"x20 5"x20 5"x20 5"x20 5"x20 5"x20 5" x 20 5"x15 5" x 15 5" x 12 5" x 12 5" x 12 5"x10   TA w/ march --  1'     1' 1' 1' 1' 1' 1' 2 x 10 2 x 10               Bridges  On theraball  2x10 On theraball  2x10 On theraball  2x10 3"x15 3"x15 3"x15 - -   -       -   -   - --   Prone press up   - -- x10 then  ADT x 1' x10 then  DAT x 1' X 10 with DAT x1'                           " "  Prone glut set  - -- 5"x20 5"x20 5"x20                             Cat/cow  - -- 5"X10 5"x10 5"x10                             Lats GTB 2x10 theraball RTB 2x10 Theraball RTB 2x10 Theraball rtc 2X15 0N sb oot RTC 2x15 sit on SB RTC  2x12 RTC  2x12 OOT RTB 2x10   OOT 2x10 rtb -         -   Rows GTB  2x10  theraball RTB 2x10 Theraball RTB 2x10 Theraball RTC 2x15  On SB oot RTC 2x15   Sit on SB RTC  2x12 RTC  2x12 OOT RTB  2x10   OOT 2x10 rtb -         -   UBE 2'/2' 120  Fwd/bkw NT 2'/2' 120  Fwd/bwd                                   Horizontal Abd -- - -- -- --   - - -- -       -         -   Scaption -- - -- -- --   - - -- -       -         -   Wall Slides  - -- -- --   - - -- -       -         -   Initials DH 2/6 JA 1/6 MNB DH 2/6 DH 1/6 RB CS 2/6 CS 1/6 FS CS 2/6 DH 1/6 RS RS CK ED ED ED ED CS 1/6       Assessment:    Patient able to increase activity with added reps as noted.  Continued complaint of neck pain after treatment, not specific to scale.    Patient Education/Response:     Patient educated to continue HEP.  Verbalized understanding.    Plans and Goals:     Cont to progress PT as able.    Short Term Goals (4  Weeks): 4/29/17 = Long Term Goals (8 Weeks): 5/29/17  1. Pt will report current pain </= 6/10 to improve quality of sleep.  MET 6/7/17  2. Pt. Will report FOTO lumbar spine </= 20-40% limitation to increase ease of carrying/lifting at home during household chores. Ongoing as of 6/7/17  3. Pt will demonstrate >/= 4+/5 B shoulder MMT in above tested areas to increase ease of carrying/lifting during household chores.  Met 6/7/17  4. Pt will demonstrate B ASLR >/= 20 degrees to improve tolerance to stair negotiation. Ongoing as of 6/7/17  5. Pt will be I c HEP to promote wellness at home. Ongoing as of 6/7/17     New Long Term goals (4 weeks) 6/29/17  1. Patient will be able to report improved ability to put shoes/sock on without any complaints of increase in low back pain 75% of the time.   2. Patient " will be able to drive and complete all necessary movements with driving including turning without any complaints of increase in pain 75% of the time.   3. Patient will be able to improve FOTO lumbar spine score of </= 20-40% limitation to increase ease of carrying/lifting at home during household chores.   4. There will be less tissue tension upon palpation throughout lumbar spine musculature.

## 2017-06-16 ENCOUNTER — CLINICAL SUPPORT (OUTPATIENT)
Dept: REHABILITATION | Facility: HOSPITAL | Age: 62
End: 2017-06-16
Attending: ORTHOPAEDIC SURGERY
Payer: MEDICARE

## 2017-06-16 DIAGNOSIS — R29.898 DECREASED ROM OF NECK: ICD-10-CM

## 2017-06-16 DIAGNOSIS — M25.69 DECREASED ROM OF TRUNK AND BACK: ICD-10-CM

## 2017-06-16 DIAGNOSIS — M62.89 QUADRICEP TIGHTNESS: ICD-10-CM

## 2017-06-16 DIAGNOSIS — M62.9 HAMSTRING TIGHTNESS OF BOTH LOWER EXTREMITIES: ICD-10-CM

## 2017-06-16 PROCEDURE — 97110 THERAPEUTIC EXERCISES: CPT | Mod: KX,PN

## 2017-06-16 PROCEDURE — 97140 MANUAL THERAPY 1/> REGIONS: CPT | Mod: PN

## 2017-06-16 NOTE — PROGRESS NOTES
TIME RECORD    Date:  06/16/2017    Start Time:  830  Stop Time:  930    PROCEDURES:    TIMED  Procedure Min.   Manual therapy 15   Therex 10   Therex (supervised) 30               Total Timed Minutes:  25  Total Timed Units:  2  Total Untimed Units:  0  Charges Billed/# of units:  2  MTx1, TEx1      Progress/Current Status    Subjective:     Patient ID: Medhat Nunez is a 62 y.o. male.  Diagnosis:   1. Decreased ROM of neck     2. Decreased ROM of trunk and back     3. Quadricep tightness     4. Hamstring tightness of both lower extremities       Pain: 7 /10  Pt states that he woke up this morning with increased pain/stiffness in lower back/glutes and also in B hands.  Reports that he used to have that all the time but since he has been coming to therapy, that pain has gone away.  Isn't sure why he woke up this morning with it.      Objective:     Patient initiated session with 15 min of STM/MFR to B thoracolumbar PSM, QL musculature, piriformis in B sidelying with pillow between bilateral LE's.  Patient completed therex per log as below 1:1 with PT x 10 minutes, additional 30 minutes with supervision, including bike to further decrease LE/back tightness.     Date 6/16/17 6/14/17 6/9/17 6/7/17 6/2/17 5/26/17 5/17/17 5/12/17 5/10/17 5/3/17 5/1/17 4/28/17 4/26/17 4/21/17 4/19/17 4/12/17 4/10/17 4/7/17 4/5/17 3/31/17   FOTO            - - At d/c Done 10/10 9/10 8/10 7/10 6/10 issued 4/5 3/5 2/5   auth Visit   7/9/17 21/24 20/24 19/24 18/24 17/24 16/24 15/24 14/24 13/24 12/12  auth measures done 11/12 10/12 9/12 8/12 7/12 6/12 5/12 4/12 3/12 2 of 12   POC 6/29/17 6/29/17 6/29/17 6/29/17   5/29/17 5/29/17 5/29/17 5/29/17 5/29/17 5/29/17 5/29/17 5/29/17 5/29/17 5/29/17 5/29/17 5/29/17 5/29/17 5/29/17 5/29/17   G Code 4/10 3/10 2/10 1/10 7/10 6/10 5/10 4/10 3/10 2/10 1/10 10/10 9/10 8/10 7/10   5/10 4/10 3/10 2 of 10   Cap Visit  Cap total 61.84 KX  2011.72  61.84KX  1949.88 125.80  1888.04 125.80  1762.24 --  1636.44    "123.68  1636.44 61.84  1512.76 125.80  1450.92 125.80  1325.12 125.80 61.84  1073.52   125.80  1011.68 61.84  885.88 125.80  824.04 93.82  698.24 125.80  604.42 93.82  478.62 123.68  384.80 93.82  261.12   91.70  167.30   MHP   - --       -   --         -   10' 10' 10' 10'   MT 15' 15'  15' 20 25' 15' 25' 23' 20' 20' 20' 12' 15' 12' 15'  17' 25 20' 35'   UT Str. 30"x3 B MT/self MT 30"x3 B 30"x3 B 30"x3 B 30"x3 B 30"x3 B 30"x3 B 30"x3 B 30"x3 B 30"x3 B NT NT 30"x3 B   NT Self 30" x 2 B 30" x 3 B - self MT   Levator Str 30"x3 B 30"x3 MT 30"x3 B 30"x3 B 30"x3 B 30"x3 B                             Open book AROM oot 2x10 B 2x10 B 2x10 B 2x10 B 2x10 B oot 2x10 B 2x10 B 2x10 B 2x10 B 2x10 NT NT x15 B 12 B  12 B 12 B       Chin Tuck oot Sit 5"x10 Sit 5"x10 Sit 5"x10 Sit 5"x10 5"x10 oot 5"x12 5"x12 5"x10 5"x10 5"x10     - -  held - 5" x 10 5"x10   LTR 30"x3 30"x3  30"x3 B 30"x3 B 30"x3 B 30"x3 B 30"x3 L only 30"x3 B 20"x3 B 20"x3 B 20"x3 B 20"x3 3x20" B 10x5" B 10"x5 B 10" x 5 B  10" x 5 B 10" x 5 B 10" x 5 B 5"x10   LTR w/theraball 5"x10 5"x10 5"x10 5"x10                                   Hip ADD str  -- - -- NT 30"x3   supine 30"x3 supine 30"x3  supine 30"x3 supine 30"x3  supine 30"x3  supine 30"x3 supine 3x30" supine 2x30" supine standing 2x30" B 1' B  2 x 30" B         HS 90/90 flossing   - -- oot oot 30x B 30x B 30x B 30x B 30x B 30 B 20 B 20 B - 15 B  15 B 12 B       Piriformis stretch 30"x3 B 30"x3 30"x3 30"x3 30"x3 30"x3 B 30"x3 B 30"x3 B 30"x3 B 30x3 B 30"x3 B 30"x3 B 3x30' B                 DKTC RPBx1' RPB x 1' - -- RPB 30x w/RPB 30x w/SB 30x w/PB 30x w/PB 30x w/PB 30x w/PB 30X w/SB 30 w/SB                 Seated lumbar flex/rot stretch   - -- oot oot 4' w/SB 4' w/PB 4' w/PB Next 4' w/PB 4' w/SB 3' w/SB 3' w/SB               TA 10"x10 10"x10 10"x10 10"x10 10"x10 10"x10 5"x20  5"x20 5"x20 5"x20 5"x20 5"x20 5"x20 5" x 20 5"x15 5" x 15 5" x 12 5" x 12 5" x 12 5"x10   TA w/ march  --  1'     1' 1' 1' 1' 1' 1' 2 x 10 2 " "x 10               Bridges  On theraball  2x12 On theraball  2x10 On theraball  2x10 On theraball  2x10 3"x15 3"x15 3"x15 - -   -       -   -   - --   Prone press up    - -- x10 then  DAT x 1' x10 then  DAT x 1' X 10 with DAT x1'                             Prone glut set   - -- 5"x20 5"x20 5"x20                             Cat/cow   - -- 5"X10 5"x10 5"x10                             Lats GTB   2x10 theraball GTB 2x10 theraball RTB 2x10 Theraball RTB 2x10 Theraball rtc 2X15 0N sb oot RTC 2x15 sit on SB RTC  2x12 RTC  2x12 OOT RTB 2x10   OOT 2x10 rtb -         -   Rows GTB   2x10  theraball GTB  2x10  theraball RTB 2x10 Theraball RTB 2x10 Theraball RTC 2x15  On SB oot RTC 2x15   Sit on SB RTC  2x12 RTC  2x12 OOT RTB  2x10   OOT 2x10 rtb -         -   UBE 2'/2' 120  Fwd/bkw 2'/2' 120  Fwd/bkw NT 2'/2' 120  Fwd/bwd                                   Bike 5'                      Horizontal Abd  -- - -- -- --   - - -- -       -         -   Scaption  -- - -- -- --   - - -- -       -         -   Wall Slides   - -- -- --   - - -- -       -         -   Initials RB DH 2/6 JA 1/6 MNB DH 2/6 DH 1/6 RB CS 2/6 CS 1/6 FS CS 2/6 DH 1/6 RS RS CK ED ED ED ED CS 1/6       Assessment:     Tightness present in B piriformis, QL and lumbar paraspinals.  Worked more on lower back today secondary to pt's increased c/o LBP.  After treatment, significant decrease in pain noted at 3-4/10.  Re-assured pt that sometimes he will have "flare-ups" but that is okay and that pain should go away if he continues with his HEP.      Patient Education/Response:     Patient educated to continue HEP.  Verbalized understanding.    Plans and Goals:     Cont to progress PT as able.    Short Term Goals (4  Weeks): 4/29/17 = Long Term Goals (8 Weeks): 5/29/17  1. Pt will report current pain </= 6/10 to improve quality of sleep.  MET 6/7/17  2. Pt. Will report FOTO lumbar spine </= 20-40% limitation to increase ease of carrying/lifting at home during household " chores. Ongoing as of 6/7/17  3. Pt will demonstrate >/= 4+/5 B shoulder MMT in above tested areas to increase ease of carrying/lifting during household chores.  Met 6/7/17  4. Pt will demonstrate B ASLR >/= 20 degrees to improve tolerance to stair negotiation. Ongoing as of 6/7/17  5. Pt will be I c HEP to promote wellness at home. Ongoing as of 6/7/17     New Long Term goals (4 weeks) 6/29/17  1. Patient will be able to report improved ability to put shoes/sock on without any complaints of increase in low back pain 75% of the time.   2. Patient will be able to drive and complete all necessary movements with driving including turning without any complaints of increase in pain 75% of the time.   3. Patient will be able to improve FOTO lumbar spine score of </= 20-40% limitation to increase ease of carrying/lifting at home during household chores.   4. There will be less tissue tension upon palpation throughout lumbar spine musculature.

## 2017-06-21 ENCOUNTER — CLINICAL SUPPORT (OUTPATIENT)
Dept: REHABILITATION | Facility: HOSPITAL | Age: 62
End: 2017-06-21
Attending: ORTHOPAEDIC SURGERY
Payer: MEDICARE

## 2017-06-21 DIAGNOSIS — M62.9 HAMSTRING TIGHTNESS OF BOTH LOWER EXTREMITIES: ICD-10-CM

## 2017-06-21 DIAGNOSIS — M62.89 QUADRICEP TIGHTNESS: ICD-10-CM

## 2017-06-21 DIAGNOSIS — M25.69 DECREASED ROM OF TRUNK AND BACK: ICD-10-CM

## 2017-06-21 DIAGNOSIS — R29.898 DECREASED ROM OF NECK: ICD-10-CM

## 2017-06-21 PROCEDURE — 97110 THERAPEUTIC EXERCISES: CPT | Mod: KX,PN

## 2017-06-21 PROCEDURE — 97140 MANUAL THERAPY 1/> REGIONS: CPT | Mod: KX,PN

## 2017-06-21 NOTE — PROGRESS NOTES
TIME RECORD    Date:  06/21/2017    Start Time:  1000  Stop Time:  1105    PROCEDURES:    TIMED  Procedure Min.   Manual therapy 20   Therex 15   Therex (supervised) 30               Total Timed Minutes:  35'  Total Timed Units:  2  Total Untimed Units:  0  Charges Billed/# of units:  2  MTx1, TEx1      Progress/Current Status    Subjective:     Patient ID: Medhat Nunez is a 62 y.o. male.  Diagnosis:   1. Decreased ROM of neck     2. Decreased ROM of trunk and back     3. Quadricep tightness     4. Hamstring tightness of both lower extremities       Pain:   Patient reports stiffness and pain mid/lower back.    Objective:     Patient received MT x20 min for STM/MFR to B thoracolumbar PSM and QL musculature, B QL stretching  in B sidelying with pillow between bilateral LE's.  Patient performed therex per log as below 1:1 with PTA x 15 minutes and therex with supervision x 30 minutes.    Date 6/21/17 6/16/17 6/14/17 6/9/17 6/7/17 6/2/17 5/26/17 5/17/17 5/12/17 5/10/17 5/3/17 5/1/17 4/28/17 4/26/17 4/21/17 4/19/17 4/12/17 4/10/17 4/7/17 4/5/17 3/31/17   FOTO             - - At d/c Done 10/10 9/10 8/10 7/10 6/10 issued 4/5 3/5 2/5   auth Visit   7/9/17 22/24 21/24 20/24 19/24 18/24 17/24 16/24 15/24 14/24 13/24 12/12  auth measures done 11/12 10/12 9/12 8/12 7/12 6/12 5/12 4/12 3/12 2 of 12   POC 6/29/17 6/29/17 6/29/17 6/29/17 6/29/17 5/29/17 5/29/17 5/29/17 5/29/17 5/29/17 5/29/17 5/29/17 5/29/17 5/29/17 5/29/17 5/29/17 5/29/17 5/29/17 5/29/17 5/29/17   G Code 5/10 4/10 3/10 2/10 1/10 7/10 6/10 5/10 4/10 3/10 2/10 1/10 10/10 9/10 8/10 7/10   5/10 4/10 3/10 2 of 10   Cap Visit  Cap total 61.84 KX  2073.56  61.84 KX  2011.72  61.84KX  1949.88 125.80  1888.04 125.80  1762.24 --  1636.44   123.68  1636.44 61.84  1512.76 125.80  1450.92 125.80  1325.12 125.80 61.84  1073.52   125.80  1011.68 61.84  885.88 125.80  824.04 93.82  698.24 125.80  604.42 93.82  478.62 123.68  384.80 93.82  261.12   91.70  167.30   MHP    -  "--       -   --         -   10' 10' 10' 10'   MT 20' 15' 15'  15' 20 25' 15' 25' 23' 20' 20' 20' 12' 15' 12' 15'  17' 25 20' 35'   UT Str. 30"x3 B 30"x3 B MT/self MT 30"x3 B 30"x3 B 30"x3 B 30"x3 B 30"x3 B 30"x3 B 30"x3 B 30"x3 B 30"x3 B NT NT 30"x3 B   NT Self 30" x 2 B 30" x 3 B - self MT   Levator Str 30"x3 B 30"x3 B 30"x3 MT 30"x3 B 30"x3 B 30"x3 B 30"x3 B                             Open book AROM  oot 2x10 B 2x10 B 2x10 B 2x10 B 2x10 B oot 2x10 B 2x10 B 2x10 B 2x10 B 2x10 NT NT x15 B 12 B  12 B 12 B       Chin Tuck 5"x10 oot Sit 5"x10 Sit 5"x10 Sit 5"x10 Sit 5"x10 5"x10 oot 5"x12 5"x12 5"x10 5"x10 5"x10     - -  held - 5" x 10 5"x10   LTR 30"x3 30"x3 30"x3  30"x3 B 30"x3 B 30"x3 B 30"x3 B 30"x3 L only 30"x3 B 20"x3 B 20"x3 B 20"x3 B 20"x3 3x20" B 10x5" B 10"x5 B 10" x 5 B  10" x 5 B 10" x 5 B 10" x 5 B 5"x10   LTR w/theraball 5"x10 5"x10 5"x10 5"x10 5"x10                                   Hip ADD str   -- - -- NT 30"x3   supine 30"x3 supine 30"x3  supine 30"x3 supine 30"x3  supine 30"x3  supine 30"x3 supine 3x30" supine 2x30" supine standing 2x30" B 1' B  2 x 30" B         HS 90/90 flossing    - -- oot oot 30x B 30x B 30x B 30x B 30x B 30 B 20 B 20 B - 15 B  15 B 12 B       Piriformis stretch 30"x3 B 30"x3 B 30"x3 30"x3 30"x3 30"x3 30"x3 B 30"x3 B 30"x3 B 30"x3 B 30x3 B 30"x3 B 30"x3 B 3x30' B                 DKTC RPBx1' RPBx1' RPB x 1' - -- RPB 30x w/RPB 30x w/SB 30x w/PB 30x w/PB 30x w/PB 30x w/PB 30X w/SB 30 w/SB                 Seated lumbar flex/rot stretch    - -- oot oot 4' w/SB 4' w/PB 4' w/PB Next 4' w/PB 4' w/SB 3' w/SB 3' w/SB               TA 10"x10 10"x10 10"x10 10"x10 10"x10 10"x10 10"x10 5"x20  5"x20 5"x20 5"x20 5"x20 5"x20 5"x20 5" x 20 5"x15 5" x 15 5" x 12 5" x 12 5" x 12 5"x10   TA w/ march   --  1'     1' 1' 1' 1' 1' 1' 2 x 10 2 x 10               Bridges  On PB  2x12 On theraball  2x12 On theraball  2x10 On theraball  2x10 On theraball  2x10 3"x15 3"x15 3"x15 - -   -       -   -   - -- " "  Prone press up     - -- x10 then  DAT x 1' x10 then  DAT x 1' X 10 with DAT x1'                             Prone glut set    - -- 5"x20 5"x20 5"x20                             Cat/cow    - -- 5"X10 5"x10 5"x10                             Lats GTB  2x15   theraball GTB   2x10 theraball GTB 2x10 theraball RTB 2x10 Theraball RTB 2x10 Theraball rtc 2X15 0N sb oot RTC 2x15 sit on SB RTC  2x12 RTC  2x12 OOT RTB 2x10   OOT 2x10 rtb -         -   Rows GTB  2x15   theraball GTB   2x10  theraball GTB  2x10  theraball RTB 2x10 Theraball RTB 2x10 Theraball RTC 2x15  On SB oot RTC 2x15   Sit on SB RTC  2x12 RTC  2x12 OOT RTB  2x10   OOT 2x10 rtb -         -   UBE 2'/2' 120  Fwd/bkw  2'/2' 120  Fwd/bkw 2'/2' 120  Fwd/bkw NT 2'/2' 120  Fwd/bwd                                   Bike L2  8' 5'                      Horizontal Abd -  -- - -- -- --   - - -- -       -         -   Scaption -  -- - -- -- --   - - -- -       -         -   Wall Slides -   - -- -- --   - - -- -       -         -   Initials CS 1/6 RB DH 2/6 JA 1/6 MNB DH 2/6 DH 1/6 RB CS 2/6 CS 1/6 FS CS 2/6 DH 1/6 RS RS CK ED ED ED ED CS 1/6       Assessment:     Patient reports feeling better after session.    Patient Education/Response:     Patient educated to continue HEP.  Verbalized understanding.    Plans and Goals:     Cont to progress PT as able.    Short Term Goals (4  Weeks): 4/29/17 = Long Term Goals (8 Weeks): 5/29/17  1. Pt will report current pain </= 6/10 to improve quality of sleep.  MET 6/7/17  2. Pt. Will report FOTO lumbar spine </= 20-40% limitation to increase ease of carrying/lifting at home during household chores. Ongoing as of 6/7/17  3. Pt will demonstrate >/= 4+/5 B shoulder MMT in above tested areas to increase ease of carrying/lifting during household chores.  Met 6/7/17  4. Pt will demonstrate B ASLR >/= 20 degrees to improve tolerance to stair negotiation. Ongoing as of 6/7/17  5. Pt will be I c HEP to promote wellness at home. Ongoing as of " 6/7/17     New Long Term goals (4 weeks) 6/29/17  1. Patient will be able to report improved ability to put shoes/sock on without any complaints of increase in low back pain 75% of the time.   2. Patient will be able to drive and complete all necessary movements with driving including turning without any complaints of increase in pain 75% of the time.   3. Patient will be able to improve FOTO lumbar spine score of </= 20-40% limitation to increase ease of carrying/lifting at home during household chores.   4. There will be less tissue tension upon palpation throughout lumbar spine musculature.

## 2017-06-23 ENCOUNTER — CLINICAL SUPPORT (OUTPATIENT)
Dept: REHABILITATION | Facility: HOSPITAL | Age: 62
End: 2017-06-23
Attending: ORTHOPAEDIC SURGERY
Payer: MEDICARE

## 2017-06-23 DIAGNOSIS — M25.69 DECREASED ROM OF TRUNK AND BACK: ICD-10-CM

## 2017-06-23 DIAGNOSIS — M62.9 HAMSTRING TIGHTNESS OF BOTH LOWER EXTREMITIES: ICD-10-CM

## 2017-06-23 DIAGNOSIS — M62.89 QUADRICEP TIGHTNESS: ICD-10-CM

## 2017-06-23 DIAGNOSIS — R29.898 DECREASED ROM OF NECK: ICD-10-CM

## 2017-06-23 PROCEDURE — G8980 MOBILITY D/C STATUS: HCPCS | Mod: CL,PN

## 2017-06-23 PROCEDURE — 97140 MANUAL THERAPY 1/> REGIONS: CPT | Mod: KX,PN

## 2017-06-23 PROCEDURE — G8979 MOBILITY GOAL STATUS: HCPCS | Mod: CK,PN

## 2017-06-23 PROCEDURE — 97110 THERAPEUTIC EXERCISES: CPT | Mod: PN

## 2017-06-23 NOTE — PROGRESS NOTES
TIME RECORD    Date:  06/23/2017    Start Time:  831  Stop Time:  930    PROCEDURES:    TIMED  Procedure Min.   Manual therapy 25   Therex 30   Therex (supervised) 0               Total Timed Minutes:  55'  Total Timed Units:  4  Total Untimed Units:  0  Charges Billed/# of units:  4  MTx2, TEx2      Progress/Current Status    Subjective:     Patient ID: Medhat Nunez is a 62 y.o. male.  Diagnosis:   1. Decreased ROM of neck     2. Decreased ROM of trunk and back     3. Quadricep tightness     4. Hamstring tightness of both lower extremities       Pain:   Pt states that some days are better than others.  Pt reports 3/10 back and neck pain today.  Is I with HEP and does have gym membership.      Objective:     Patient received MT x25 min for STM/MFR to B thoracolumbar PSM and QL musculature, B QL stretching  in B sidelying with pillow between bilateral LE's and then in supine for STM/MFR to cervical paraspinals.  Patient performed therex per log as below 1:1 with PT x 30 minutes.      Date 6/23/17 6/21/17 6/16/17 6/14/17 6/9/17 6/7/17 6/2/17 5/26/17 5/17/17 5/12/17 5/10/17 5/3/17 5/1/17 4/28/17 4/26/17 4/21/17 4/19/17 4/12/17 4/10/17 4/7/17 4/5/17 3/31/17   FOTO              - - At d/c Done 10/10 9/10 8/10 7/10 6/10 issued 4/5 3/5 2/5   auth Visit   7/9/17 23/24 22/24 21/24 20/24 19/24 18/24 17/24 16/24 15/24 14/24 13/24 12/12  auth measures done 11/12 10/12 9/12 8/12 7/12 6/12 5/12 4/12 3/12 2 of 12   POC 6/29/17 6/29/17 6/29/17 6/29/17 6/29/17 6/29/17 5/29/17 5/29/17 5/29/17 5/29/17 5/29/17 5/29/17 5/29/17 5/29/17 5/29/17 5/29/17 5/29/17 5/29/17 5/29/17 5/29/17 5/29/17   G Code 6/10 5/10 4/10 3/10 2/10 1/10 7/10 6/10 5/10 4/10 3/10 2/10 1/10 10/10 9/10 8/10 7/10   5/10 4/10 3/10 2 of 10   Cap Visit  Cap total 123.68KX  2197.24 61.84 KX  2073.56  61.84 KX  2011.72  61.84KX  1949.88 125.80  1888.04 125.80  1762.24 --  1636.44   123.68  1636.44 61.84  1512.76 125.80  1450.92 125.80  1325.12 125.80 61.84  1073.52    "125.80  1011.68 61.84  885.88 125.80  824.04 93.82  698.24 125.80  604.42 93.82  478.62 123.68  384.80 93.82  261.12   91.70  167.30   MHP     - --       -   --         -   10' 10' 10' 10'   MT 25' 20' 15' 15'  15' 20 25' 15' 25' 23' 20' 20' 20' 12' 15' 12' 15'  17' 25 20' 35'   UT Str. 30"x3 B 30"x3 B 30"x3 B MT/self MT 30"x3 B 30"x3 B 30"x3 B 30"x3 B 30"x3 B 30"x3 B 30"x3 B 30"x3 B 30"x3 B NT NT 30"x3 B   NT Self 30" x 2 B 30" x 3 B - self MT   Levator Str 30"x3 B 30"x3 B 30"x3 B 30"x3 MT 30"x3 B 30"x3 B 30"x3 B 30"x3 B                             Open book AROM   oot 2x10 B 2x10 B 2x10 B 2x10 B 2x10 B oot 2x10 B 2x10 B 2x10 B 2x10 B 2x10 NT NT x15 B 12 B  12 B 12 B       [place] 5"x10 sit 5"x10 oot Sit 5"x10 Sit 5"x10 Sit 5"x10 Sit 5"x10 5"x10 oot 5"x12 5"x12 5"x10 5"x10 5"x10     - -  held - 5" x 10 5"x10   LTR 30"x3 B 30"x3 30"x3 30"x3  30"x3 B 30"x3 B 30"x3 B 30"x3 B 30"x3 L only 30"x3 B 20"x3 B 20"x3 B 20"x3 B 20"x3 3x20" B 10x5" B 10"x5 B 10" x 5 B  10" x 5 B 10" x 5 B 10" x 5 B 5"x10   LTR w/theraball 5"x10 5"x10 5"x10 5"x10 5"x10 5"x10                                   Hip ADD str    -- - -- NT 30"x3   supine 30"x3 supine 30"x3  supine 30"x3 supine 30"x3  supine 30"x3  supine 30"x3 supine 3x30" supine 2x30" supine standing 2x30" B 1' B  2 x 30" B         HS 90/90 flossing     - -- oot oot 30x B 30x B 30x B 30x B 30x B 30 B 20 B 20 B - 15 B  15 B 12 B       Piriformis stretch 30"x3 B 30"x3 B 30"x3 B 30"x3 30"x3 30"x3 30"x3 30"x3 B 30"x3 B 30"x3 B 30"x3 B 30x3 B 30"x3 B 30"x3 B 3x30' B                 DKTC RPBx1' RPBx1' RPBx1' RPB x 1' - -- RPB 30x w/RPB 30x w/SB 30x w/PB 30x w/PB 30x w/PB 30x w/PB 30X w/SB 30 w/SB                 Seated lumbar flex/rot stretch     - -- oot oot 4' w/SB 4' w/PB 4' w/PB Next 4' w/PB 4' w/SB 3' w/SB 3' w/SB               TA 10"x10 10"x10 10"x10 10"x10 10"x10 10"x10 10"x10 10"x10 5"x20  5"x20 5"x20 5"x20 5"x20 5"x20 5"x20 5" x 20 5"x15 5" x 15 5" x 12 5" x 12 5" x 12 5"x10 " "  TA w/ march    --  1'     1' 1' 1' 1' 1' 1' 2 x 10 2 x 10               Bridges  On PB 2x12 On PB  2x12 On theraball  2x12 On theraball  2x10 On theraball  2x10 On theraball  2x10 3"x15 3"x15 3"x15 - -   -       -   -   - --   Prone press up      - -- x10 then  DAT x 1' x10 then  DAT x 1' X 10 with DAT x1'                             Prone glut set     - -- 5"x20 5"x20 5"x20                             Cat/cow     - -- 5"X10 5"x10 5"x10                             Lats BTB  2x15 theraball GTB  2x15   theraball GTB   2x10 theraball GTB 2x10 theraball RTB 2x10 Theraball RTB 2x10 Theraball rtc 2X15 0N sb oot RTC 2x15 sit on SB RTC  2x12 RTC  2x12 OOT RTB 2x10   OOT 2x10 rtb -         -   Rows BTB  2x15 theraball GTB  2x15   theraball GTB   2x10  theraball GTB  2x10  theraball RTB 2x10 Theraball RTB 2x10 Theraball RTC 2x15  On SB oot RTC 2x15   Sit on SB RTC  2x12 RTC  2x12 OOT RTB  2x10   OOT 2x10 rtb -         -   UBE oot 2'/2' 120  Fwd/bkw  2'/2' 120  Fwd/bkw 2'/2' 120  Fwd/bkw NT 2'/2' 120  Fwd/bwd                                   Bike oot L2  8' 5'                      Horizontal Abd  -  -- - -- -- --   - - -- -       -         -   Scaption  -  -- - -- -- --   - - -- -       -         -   Wall Slides  -   - -- -- --   - - -- -       -         -   Initials RB CS 1/6 RB DH 2/6 JA 1/6 MNB DH 2/6 DH 1/6 RB CS 2/6 CS 1/6 FS CS 2/6 DH 1/6 RS RS CK ED ED ED ED CS 1/6       Assessment:     Since beginning PT, pt has been seen 23 times since initial evaluation on 4/10. Overall, his progress has been good.   PT treatments and has worked hard towards all of his goals.   PT goals as evidenced by subjective and objective improvements. Since attending PT he has reached most of his PT goals. He will be discharged with an updated HEP and was instructed to contact us with any other questions or concerns. Pt agreeable to d/c. Pt no longer requires skilled physical therapy.      Patient Education/Response:     Patient educated to " continue HEP.  Verbalized understanding.    Plans and Goals:     Discharge pt from PT    Short Term Goals (4  Weeks): 4/29/17 = Long Term Goals (8 Weeks): 5/29/17  1. Pt will report current pain </= 6/10 to improve quality of sleep.  MET 6/7/17  2. Pt will demonstrate >/= 4+/5 B shoulder MMT in above tested areas to increase ease of carrying/lifting during household chores.  MET 6/7/17  3. Pt will demonstrate B ASLR >/= 20 degrees to improve tolerance to stair negotiation. MET 6/23/17  4. Pt will be I c HEP to promote wellness at home. MET 6/23/17     New Long Term goals (4 weeks) 6/29/17  1. Patient will be able to report improved ability to put shoes/sock on without any complaints of increase in low back pain 75% of the time. MET 6/23/17  2. Patient will be able to drive and complete all necessary movements with driving including turning without any complaints of increase in pain 75% of the time. MET 6/23/17  3. Patient will be able to improve FOTO lumbar spine score of </= 20-40% limitation to increase ease of carrying/lifting at home during household chores. Not met 6/23/17  4. There will be less tissue tension upon palpation throughout lumbar spine musculature. Ongoing 6/23/17

## 2018-02-20 ENCOUNTER — HOSPITAL ENCOUNTER (OUTPATIENT)
Dept: RADIOLOGY | Facility: HOSPITAL | Age: 63
Discharge: HOME | End: 2018-02-20
Attending: INTERNAL MEDICINE
Payer: MEDICARE

## 2018-02-20 DIAGNOSIS — M79.2 NEUROGENIC PAIN: Primary | ICD-10-CM

## 2018-02-20 DIAGNOSIS — M79.2 NEUROPATHIC PAIN: Primary | ICD-10-CM

## 2018-02-20 DIAGNOSIS — M79.2 NEUROGENIC PAIN: ICD-10-CM

## 2018-02-20 DIAGNOSIS — M79.2 NEUROPATHIC PAIN: ICD-10-CM

## 2018-02-20 PROCEDURE — 73521 X-RAY EXAM HIPS BI 2 VIEWS: CPT | Mod: TC,FY

## 2018-02-20 PROCEDURE — 73565 X-RAY EXAM OF KNEES: CPT | Mod: 26,,, | Performed by: RADIOLOGY

## 2018-02-20 PROCEDURE — 73521 X-RAY EXAM HIPS BI 2 VIEWS: CPT | Mod: 26,,, | Performed by: RADIOLOGY

## 2018-02-20 PROCEDURE — 73565 X-RAY EXAM OF KNEES: CPT | Mod: TC,FY

## 2018-02-21 DIAGNOSIS — M19.019 PRIMARY OSTEOARTHRITIS OF SHOULDER: ICD-10-CM

## 2018-02-21 DIAGNOSIS — M54.50 LUMBAGO: Primary | ICD-10-CM

## 2018-02-21 DIAGNOSIS — M17.0 PRIMARY OSTEOARTHRITIS OF BOTH KNEES: ICD-10-CM

## 2018-02-21 DIAGNOSIS — M79.2 NEUROPATHIC PAIN: ICD-10-CM

## 2018-03-13 ENCOUNTER — CLINICAL SUPPORT (OUTPATIENT)
Dept: REHABILITATION | Facility: HOSPITAL | Age: 63
End: 2018-03-13
Attending: INTERNAL MEDICINE
Payer: MEDICARE

## 2018-03-13 DIAGNOSIS — M25.562 CHRONIC PAIN OF BOTH KNEES: ICD-10-CM

## 2018-03-13 DIAGNOSIS — G89.29 CHRONIC PAIN OF BOTH KNEES: ICD-10-CM

## 2018-03-13 DIAGNOSIS — R29.3 POOR POSTURE: ICD-10-CM

## 2018-03-13 DIAGNOSIS — M54.50 LUMBAR PAIN: ICD-10-CM

## 2018-03-13 DIAGNOSIS — M25.561 CHRONIC PAIN OF BOTH KNEES: ICD-10-CM

## 2018-03-13 PROCEDURE — 97162 PT EVAL MOD COMPLEX 30 MIN: CPT | Mod: PN

## 2018-03-13 PROCEDURE — G8979 MOBILITY GOAL STATUS: HCPCS | Mod: CK,PN

## 2018-03-13 PROCEDURE — G8978 MOBILITY CURRENT STATUS: HCPCS | Mod: CL,PN

## 2018-03-13 NOTE — PLAN OF CARE
"  TIME RECORD    Date: 3/13/2018    Start Time:  12:16 pm   Stop Time:  12:58 pm     PROCEDURES:    TIMED  Procedure Min.                         UNTIMED  Procedure Min.   IE 42'         Total Timed Minutes:  0  Total Timed Units:  0  Total Untimed Units:  1  Charges Billed/# of units:  1 IE    OUTPATIENT PHYSICAL THERAPY   PATIENT EVALUATION  Onset Date: chronic  Primary Diagnosis: (B) lumbar pain, (B) knee pain, decreased core strength, decreased postural awareness, decreased functional mobility  Treatment Diagnosis:   1. Lumbar pain     2. Chronic pain of both knees     3. Poor posture       No past medical history on file.  Precautions: standard  Prior Therapy: yes  Medications: Medhat Nunez has a current medication list which includes the following prescription(s): multivitamin.  Nutrition:  Obese  History of Present Illness: chronic  Prior Level of Function: Independent  Social History: Pt stated that he is not currently employed.   Place of Residence (Steps/Adaptations): Pt stated that he has 5 steps with handrail to enter his [facility].   Functional Deficits Leading to Referral/Nature of Injury: pt reports difficulty/increased pain performing all activity   Patient Therapy Goals: decrease pain    Subjective     Medhat Nunez states that he has been experiencing (B) low back for approximately 3 years. Pt stated that the pain is constant. Pt reported that he has undergone an xray and MRI on his back, and was told didn't find anything. Pt reported that he has not had any back surgeries. Pt denies radiating pain into (B) LE and denies numbness/tingling in (B) LE. Pt stated that he has been experiencing pain in (B) knees since 2005. Pt stated that he has been diagnosed with arthritis in (R) knee.     Pain:  Location: (B) lumbar region   Description: Aching and Sharp  Activities Which Increase Pain: Standing, Bending, Walking, Lifting and Getting out of bed/chair, "anything I do"  Activities Which Decrease Pain: " change positions frequently, pain medicine  Pain Scale: 8/10 at best 8/10 now  8/10 at worst    Location: (B) knees   Description: Aching  Activities Which Increase Pain: stairs, ladder, getting out of chair  Activities Which Decrease Pain: walking  Pain Scale: 0/10 at best 0/10 now  8/10 at worst    Objective     Posture: rounded shoulders, forward head, (B) genu varus  Palpation: no c/o tenderness to palpation along (B) knees      Range of Motion/Strength:     Lumbar AROM: Pain/Dysfunction with Movement:   Flexion 30 degrees C/o increased pain across (B) lumbar region   Extension 10 degrees C/o increased pain across (B) lumbar region   Right side bending 10 degrees C/o increased pain in (L) lumbar/ QL region   Left side bending 10 degrees C/o increased pain in (L) lumbar/QL region   Right rotation 15 degrees C/o increased pain in (R) lumbar region   Left rotation 10 degrees C/o increased pain in (B) lumbar region (R) > (L)      Hip Right  Left  Pain/Dysfunction with Movement    AROM MMT AROM MMT    Flexion WFL 5/5 WFL 5/5    Extension NT NT NT NT Able to perform fair bridge against gravity, c/o pain with bridge   Abduction WFL 4+/5 WFL 4+/5       Knee  Right   Left  Pain/Dysfunction with Movement    AROM PROM MMT AROM PROM MMT    Flexion 132 NT 5/5 140 NT 5/5    Extension 2 NT 4+/5 2 NT 4+/5      Ankle Right  Left  Pain/Dysfunction with Movement    AROM MMT AROM MMT    Plantarflexion WFL 5/5 WFL 5/5    Dorsiflexion WFL 5/5 WFL 5/5            Flexibility: (R) patella hypomobile in all directions, decreased (B) hamstring, gastroc, and piriformis  flexibility  Gait: Without AD  Analysis: pt demo antalgic gait with decreased (B) heel strike, decreased wayne, decreased (B) step length  Bed Mobility:Independent  Transfers: Independent  Special Tests: 90/90 hamstring test: (R) = 20 degrees, (L) = 15 degrees      Functional Limitation Reports: G codes  Tool: FOTO lumbar  SURVEY  Category: mobility ()  Limitation:  66%  Current: CL = least 60% but < 80% impaired, limited or restricted  Goal: CK = at least 40% but < 60% impaired, limited or restricted    TREATMENT     Time In:   Time Out:     PT Evaluation Completed? Yes  Discussed Plan of Care with patient: Yes    Medhat received 0 minutes of therapeutic exercise & instruction including:    Medhat received 0 minutes of manual therapy including:      Written Home Exercises Provided: no        Assessment       Initial Assessment (Pertinent finding, problem list and factors affecting outcome): Mr. Nunez is a 63 year old male with c/o chronic lumbar pain and (B) knee pain. Pt presents with decreased/painful lumbar AROM, decreased LE strength/flexibility, decreased core strength, decreased postural awareness, and decreased functional mobility. Pt will benefit from skilled PT to address the limitations listed above in order to return pt to her highest level of function with decreased pain and limitation.     History  Co-morbidities and personal factors that may impact the plan of care Examination  Body Structures and Functions, activity limitations and participation restrictions that may impact the plan of care    Clinical Presentation   Co-morbidities:   kidney problems        Personal Factors:   no deficits Body Regions:   back  lower extremities    Body Systems:    ROM  strength  gait  transfers            Participation Restrictions:   n/a     Activity limitations:   Learning and applying knowledge  no deficits    General Tasks and Commands  no deficits    Communication  no deficits    Mobility  walking    Self care  no deficits    Domestic Life  doing house work (cleaning house, washing dishes, laundry)    Interactions/Relationships  no deficits    Life Areas  no deficits    Community and Social Life  no deficits         evolving clinical presentation with changing clinical characteristics                      moderate   low  high Decision Making/ Complexity Score:  moderate  "      Rehab Potiential: fair  Barriers to Rehab: n/a    Short Term Goals (4 Weeks): 4/13/18  1. Pt will be compliant with HEP to supplement PT in decreasing pain with functional mobility.  2. Pt will perform and hold TA contraction for 10x10" in dynamic sitting activities to demonstrate improved core strength  3. Pt will improve lumbar ROM by 10 deg in all planes to improve functional mobility.    Long Term Goals (8 Weeks): 5/13/18  1. Pt will improve FOTO score to </= 50% limited to decrease perceived limitation with maintaining/changing body position  2. Pt will perform and hold TA contraction for 10x10" in dynamic standing activities to demonstrate improved core strength  3. Pt will improve impaired LE MMTs to 5/5 to improve strength for functional tasks.  4. Pt will report lumbar pain </= 5/10 during lifting activities to promote functional QOL.  5. Pt will report lumbar  pain </= 5/10 during lumbar ROM to promote functional mobility.  6. Pt will report</= 4/10 pain in (R) knee when performing ADLs in order to be able to perform ADLs with less difficulty      Plan     Certification Period: 3/13/18 to 5/13/18  Recommended Treatment Plan: 2 times per week for 8 weeks: Electrical Stimulation IFC, Gait Training, Group Therapy, Locomotor Training, Manual Therapy, Moist Heat/ Ice, Neuromuscular Re-ed, Patient Education, Self Care, Therapeutic Activites and Therapeutic Exercise  Other Recommendations: modalities prn, ASTYM prn, kinesiotape prn, Functional Dry Needling prn       Therapist: Cat Daniel, PT    I CERTIFY THE NEED FOR THESE SERVICES FURNISHED UNDER THIS PLAN OF TREATMENT AND WHILE UNDER MY CARE    Physician's comments: ________________________________________________________________________________________________________________________________________________      Physician's Name: ___________________________________  "

## 2018-03-23 ENCOUNTER — CLINICAL SUPPORT (OUTPATIENT)
Dept: REHABILITATION | Facility: HOSPITAL | Age: 63
End: 2018-03-23
Attending: INTERNAL MEDICINE
Payer: MEDICARE

## 2018-03-23 DIAGNOSIS — M54.50 LUMBAR PAIN: ICD-10-CM

## 2018-03-23 DIAGNOSIS — R29.3 POOR POSTURE: ICD-10-CM

## 2018-03-23 DIAGNOSIS — M25.562 CHRONIC PAIN OF BOTH KNEES: ICD-10-CM

## 2018-03-23 DIAGNOSIS — G89.29 CHRONIC PAIN OF BOTH KNEES: ICD-10-CM

## 2018-03-23 DIAGNOSIS — M25.561 CHRONIC PAIN OF BOTH KNEES: ICD-10-CM

## 2018-03-23 PROCEDURE — 97110 THERAPEUTIC EXERCISES: CPT | Mod: PN

## 2018-03-23 PROCEDURE — 97140 MANUAL THERAPY 1/> REGIONS: CPT | Mod: PN

## 2018-03-23 NOTE — PROGRESS NOTES
"DAILY TREATMENT NOTE      Start Time:  10:03 am  Stop Time:  10:50 am    PROCEDURES:    TIMED  Procedure Min.   TE supervised 21' NC   TE 16'    MT 10'              UNTIMED  Procedure Min.             Total Timed Minutes:  26'   Total Timed Units:  2  Total Untimed Units:  0  Charges Billed/# of units:  2 ( 1 TE, 1 MT)       Progress/Current Status    Subjective:     Patient ID: Medhat Nunez is a 63 y.o. male.  Diagnosis:   1. Lumbar pain     2. Chronic pain of both knees     3. Poor posture       Pain: 8 /10 (L) lumbar region prior to therapy session   Pt stated that his lumbar pain alternates from (R) to (L). Pt stated that today his pain is in (L) lumbar region.     Objective:   Pt received MT consisting of pt in (R) SL for STM to (L)  Lumbar paraspinals and QL x10'. Pt participated in therex per log below 1:1 with PT x16' and supervised therex x 21'.     Date   3/23/18   Visit  2/12   POC update  3/13/18   G code  Exp:  2/10   Visit Amt  57.78   Total Amt  140.66   MHP     MT  10'   bike 5'   Stretches     HSS 3x30" B   piriformis 2x30" B   QL 3x30" B   Supine:     TAs  5"x10    LTR  5" hold 2'   March     (D) KTC 5" hold 2' w/PB   Bug     Bridge  2x10   SLR     Prone:     Alt LE     Alt LE/UE     Seated:     TAs March     LAQs     Lats     Rows  GTB 2x15   UBE     Leg Press     Initials  CK         Assessment:     Pt was instructed to notify therapist if any therex causes increased pain. Pt verbalized understanding. Pt was able to tolerate all therex noted per log above without any c/o increased pain. Pt demo increased soft tissue restrictions along (L) QL region. Pt reported experiencing 6/10 pain in (L) lumbar region at the end of therapy session compared to 8/10 pain in (L) lumbar region at the beginning of therapy session.    Patient Education/Response:      GIVE HEP NEXT VISIT    Plans and Goals:   Continue per POC, progress as tolerated    Short Term Goals (4 Weeks): 4/13/18  1. Pt will be " "compliant with HEP to supplement PT in decreasing pain with functional mobility.  2. Pt will perform and hold TA contraction for 10x10" in dynamic sitting activities to demonstrate improved core strength  3. Pt will improve lumbar ROM by 10 deg in all planes to improve functional mobility.     Long Term Goals (8 Weeks): 5/13/18  1. Pt will improve FOTO score to </= 50% limited to decrease perceived limitation with maintaining/changing body position  2. Pt will perform and hold TA contraction for 10x10" in dynamic standing activities to demonstrate improved core strength  3. Pt will improve impaired LE MMTs to 5/5 to improve strength for functional tasks.  4. Pt will report lumbar pain </= 5/10 during lifting activities to promote functional QOL.  5. Pt will report lumbar  pain </= 5/10 during lumbar ROM to promote functional mobility.  6. Pt will report</= 4/10 pain in (R) knee when performing ADLs in order to be able to perform ADLs with less difficulty  "

## 2018-03-27 ENCOUNTER — DOCUMENTATION ONLY (OUTPATIENT)
Dept: REHABILITATION | Facility: HOSPITAL | Age: 63
End: 2018-03-27

## 2018-03-27 ENCOUNTER — CLINICAL SUPPORT (OUTPATIENT)
Dept: REHABILITATION | Facility: HOSPITAL | Age: 63
End: 2018-03-27
Attending: INTERNAL MEDICINE
Payer: MEDICARE

## 2018-03-27 DIAGNOSIS — M25.562 CHRONIC PAIN OF BOTH KNEES: ICD-10-CM

## 2018-03-27 DIAGNOSIS — M25.561 CHRONIC PAIN OF BOTH KNEES: ICD-10-CM

## 2018-03-27 DIAGNOSIS — R29.3 POOR POSTURE: ICD-10-CM

## 2018-03-27 DIAGNOSIS — G89.29 CHRONIC PAIN OF BOTH KNEES: ICD-10-CM

## 2018-03-27 DIAGNOSIS — M54.50 LUMBAR PAIN: ICD-10-CM

## 2018-03-27 PROCEDURE — 97140 MANUAL THERAPY 1/> REGIONS: CPT | Mod: PN

## 2018-03-27 PROCEDURE — 97110 THERAPEUTIC EXERCISES: CPT | Mod: PN

## 2018-03-27 NOTE — PROGRESS NOTES
Face to Face PTA Conference performed with Sara Ray PTA and Evesn Hernandez PTA regarding patient's current status, overall progress, and plan of care.     Cat Daniel, PT    Face to face meeting completed with Cat Daniel  PT regarding current status and progress of   Medhat Nunez .  Evens Hernandez PTA    Face to face meeting completed with Cat Daniel, PT regarding current status and progress of   Medhat Nunez .  Sara Ray, PTA

## 2018-03-27 NOTE — PROGRESS NOTES
"DAILY TREATMENT NOTE    DATE: 3/27/2018    Start Time:  1000  Stop Time:  1100    PROCEDURES:    TIMED  Procedure Min.   Manual therapy 15   Therex supervised 20   therex 20            Total Timed Minutes:  35  Total Timed Units:  2  Total Untimed Units:  0  Charges Billed/# of units:  2 MTx1, TEx1      Progress/Current Status    Subjective:     Patient ID: Medhat Nunez is a 63 y.o. male.  Diagnosis:   1. Lumbar pain     2. Chronic pain of both knees     3. Poor posture       Pain: 8 /10  "About the same all the time" reports relief after last visit for about an hour."    Objective:     Manual therapy provided with patient positioned in (R) SL for STM/MFR to (L)  Lumbar paraspinals to sacral border and QL with manual QL stretching x1 5'. Pt participated in therex per log below 1:1 with PTA x 20' and supervised therex x 20'.     Date  3/27/18  3/23/18   Visit  3/12 2/12   POC update  5/13/18 3/13/18   G code  3/10 2/10   FOTO 3/5    Visit Amt    57.78 57.78   Total Amt  198.44 140.66   MHP      MT  15' 10'   bike  5'   Stretches      HSS 30"x3 B 3x30" B   piriformis 30"x3 B 2x30" B   QL MT/sit B 3x30" B   Supine:      TAs  5"x12 5"x10    LTR  5" hold x 2' 5" hold 2'   March  30"x1    (D) KTC 5"hold x 2'  W/ Grn PB 5" hold 2' w/PB   Bug  --    Bridge  2x10  2x10   SLR  Next?    Prone:      Alt LE      Alt LE/UE      Seated:      TAs  5"x10    March  --    LAQs  --    Lats  GTB 2x15    Rows  GTB 2x15 GTB 2x15   UBE  --    Leg Press  --    Initials  DH 1/6 CK       Assessment:     Patient able to increase activity today with reports of "A little better now"  Not specific to scale.    Patient Education/Response:     Patient edu for postural awareness and TAs with daily activity.  Verbalized understanding. GIVE HEP NEXT VISIT    Plans and Goals:     Continue per POC, progress as tolerated    Short Term Goals (4 Weeks): 4/13/18  1. Pt will be compliant with HEP to supplement PT in decreasing pain with functional " "mobility.  2. Pt will perform and hold TA contraction for 10x10" in dynamic sitting activities to demonstrate improved core strength  3. Pt will improve lumbar ROM by 10 deg in all planes to improve functional mobility.     Long Term Goals (8 Weeks): 5/13/18  1. Pt will improve FOTO score to </= 50% limited to decrease perceived limitation with maintaining/changing body position  2. Pt will perform and hold TA contraction for 10x10" in dynamic standing activities to demonstrate improved core strength  3. Pt will improve impaired LE MMTs to 5/5 to improve strength for functional tasks.  4. Pt will report lumbar pain </= 5/10 during lifting activities to promote functional QOL.  5. Pt will report lumbar  pain </= 5/10 during lumbar ROM to promote functional mobility.  6. Pt will report</= 4/10 pain in (R) knee when performing ADLs in order to be able to perform ADLs with less difficulty  "

## 2018-04-03 ENCOUNTER — CLINICAL SUPPORT (OUTPATIENT)
Dept: REHABILITATION | Facility: HOSPITAL | Age: 63
End: 2018-04-03
Attending: INTERNAL MEDICINE
Payer: MEDICARE

## 2018-04-03 DIAGNOSIS — M54.50 LUMBAR PAIN: ICD-10-CM

## 2018-04-03 DIAGNOSIS — M25.562 CHRONIC PAIN OF BOTH KNEES: ICD-10-CM

## 2018-04-03 DIAGNOSIS — R29.3 POOR POSTURE: ICD-10-CM

## 2018-04-03 DIAGNOSIS — G89.29 CHRONIC PAIN OF BOTH KNEES: ICD-10-CM

## 2018-04-03 DIAGNOSIS — M25.561 CHRONIC PAIN OF BOTH KNEES: ICD-10-CM

## 2018-04-03 PROCEDURE — 97110 THERAPEUTIC EXERCISES: CPT | Mod: PN

## 2018-04-03 PROCEDURE — 97140 MANUAL THERAPY 1/> REGIONS: CPT | Mod: PN

## 2018-04-03 NOTE — PROGRESS NOTES
TIME RECORD    Date:  4/3/2018      Start Time:  0950   Stop Time:  1005    PROCEDURES:    TIMED  Procedure Min.   MT 15                     UNTIMED  Procedure Min.             Total Timed Minutes:  15  Total Timed Units:  1  Total Untimed Units:  0  Charges Billed/# of units:  1 MT       Progress/Current Status    Subjective:     Patient ID: Medhat Nunez is a 63 y.o. male.  Diagnosis:   1. Lumbar pain     2. Chronic pain of both knees     3. Poor posture       Agreeable to FDN    Objective:   Palpation: (+) ttp with increased tissue tension B lumbar paraspinals and multidus.   Patient provided written and verbal consent to FDN. MT x 15 consisting of FDN to B L4 and 5 multifidus with estim to both in prone over pillow.     Assessment:     Patient demonstrated appropriate response to FDN with good contractions noted in treated muscle groups     Patient Education/Response:     Patient educated on FDN initially. Patient then educated on response to FDN with handout issued. See Patient Instructions for further details    Plans and Goals:     Monitor response to FDN. Continue with FDN in POC as tolerated.

## 2018-04-03 NOTE — PROGRESS NOTES
"DAILY TREATMENT NOTE    DATE: 4/3/2018    Start Time:  9:07 am   Stop Time:  9:50 am    PROCEDURES:    TIMED  Procedure Min.   TE supervised 5' NC   TE 38'                  UNTIMED  Procedure Min.             Total Timed Minutes:  38'  Total Timed Units:  3  Total Untimed Units:  0  Charges Billed/# of units:  3 TE      Progress/Current Status    Subjective:     Patient ID: Medhat Nunez is a 63 y.o. male.  Diagnosis:   1. Lumbar pain     2. Chronic pain of both knees     3. Poor posture       Pain: 8 /10 pain in (R) lumbar region prior to therapy session  Pt stated that he feels better after therapy sessions, but then pain comes back later.     Objective:     Pt participated in therex per log below 1:1 with PT x 38' and bike x 5' supervised. FDN performed by Edin Song, PT (see note).     Date  4/3/18 3/27/18  3/23/18   Visit  4/12 3/12 2/12   POC update  5/13/18 5/13/18 3/13/18   G code  4/10 3/10 2/10   FOTO 4/5 3/5    Visit Amt  118.42   57.78 57.78   Total Amt  316.86 198.44 140.66   MHP       MT  FDN 15' 10'   bike 5'  5'   Stretches       HSS 30"x3 B 30"x3 B 3x30" B   piriformis 30"x3 B 30"x3 B 2x30" B   QL 3x30" R MT/sit B 3x30" B   Supine:       TAs  5"x15 5"x12 5"x10    LTR  5" hold x2' w/PB 5" hold x 2' 5" hold 2'   March  held 30"x1    (D) KTC 5" hold x 2' w/PB 5"hold x 2'  W/ Grn PB 5" hold 2' w/PB   Bug   --    Bridge  2x15 2x10  2x10   SLR  - Next?    SL hip abduction 1x10 B     Prone:       Alt LE       Alt LE/UE       Seated:       TAs   5"x10    March   --    LAQs  Next? --    Lats  GTB 2x15  GTB 2x15    Rows  GTB 2x15 GTB 2x15 GTB 2x15   UBE   --    Leg Press  Next? --    Initials  CK DH 1/6 CK       Assessment:     Pt was able to tolerate all therex without any c/o increased pain. Pt reported experiencing decreased lumbar pain at the end of therapy session. Monitor response to FDN.     Patient Education/Response:     Continue with HEP    Plans and Goals:     Continue per POC, progress " "as tolerated    Short Term Goals (4 Weeks): 4/13/18  1. Pt will be compliant with HEP to supplement PT in decreasing pain with functional mobility.  2. Pt will perform and hold TA contraction for 10x10" in dynamic sitting activities to demonstrate improved core strength  3. Pt will improve lumbar ROM by 10 deg in all planes to improve functional mobility.     Long Term Goals (8 Weeks): 5/13/18  1. Pt will improve FOTO score to </= 50% limited to decrease perceived limitation with maintaining/changing body position  2. Pt will perform and hold TA contraction for 10x10" in dynamic standing activities to demonstrate improved core strength  3. Pt will improve impaired LE MMTs to 5/5 to improve strength for functional tasks.  4. Pt will report lumbar pain </= 5/10 during lifting activities to promote functional QOL.  5. Pt will report lumbar  pain </= 5/10 during lumbar ROM to promote functional mobility.  6. Pt will report</= 4/10 pain in (R) knee when performing ADLs in order to be able to perform ADLs with less difficulty  "

## 2018-04-06 ENCOUNTER — CLINICAL SUPPORT (OUTPATIENT)
Dept: REHABILITATION | Facility: HOSPITAL | Age: 63
End: 2018-04-06
Attending: INTERNAL MEDICINE
Payer: MEDICARE

## 2018-04-06 DIAGNOSIS — M25.561 CHRONIC PAIN OF BOTH KNEES: ICD-10-CM

## 2018-04-06 DIAGNOSIS — M25.562 CHRONIC PAIN OF BOTH KNEES: ICD-10-CM

## 2018-04-06 DIAGNOSIS — R29.3 POOR POSTURE: ICD-10-CM

## 2018-04-06 DIAGNOSIS — M54.50 LUMBAR PAIN: ICD-10-CM

## 2018-04-06 DIAGNOSIS — G89.29 CHRONIC PAIN OF BOTH KNEES: ICD-10-CM

## 2018-04-06 PROCEDURE — 97110 THERAPEUTIC EXERCISES: CPT | Mod: PN

## 2018-04-06 PROCEDURE — 97140 MANUAL THERAPY 1/> REGIONS: CPT | Mod: PN

## 2018-04-06 NOTE — PROGRESS NOTES
"DAILY TREATMENT NOTE    DATE: 4/6/2018    Start Time:  900 - 940  Stop Time:  950 - 1005    PROCEDURES:    TIMED  Procedure Min.   Therex 25   Therex supervised 20                 UNTIMED  Procedure Min.   Bike 5         Total Timed Minutes:  25  Total Timed Units:  2  Total Untimed Units:  0  Charges Billed/# of units:  2 TE (Additional chg by CATARINO Song PT, DPT      Progress/Current Status    Subjective:     Patient ID: Medhat Nunez is a 63 y.o. male.  Diagnosis:   1. Lumbar pain     2. Chronic pain of both knees     3. Poor posture       Pain: 8 /10  States 2 1/2 days relief after FDN last visit.  Pain towards sides more than mid back now.    Objective:     Pt participated in therex per log below 1:1 with PTA x 25' and bike x 5' supervised. FDN performed x 10 min by Edin Song PT (see note).   Patient performed additional 20 minutes therex with supervision.  Completed FOTO at end of session.    Date  4/6/18 4/3/18 3/27/18  3/23/18   Visit  5/12 4/12 3/12 2/12   POC update  5/13/18 5/13/18 5/13/18 3/13/18   G code  5/10 4/10 3/10 2/10   FOTO 5/5 DONE 4/5 3/5    Visit Amt    88.10 118.42   57.78 57.78   Total Amt  404.96 316.86 198.44 140.66   MHP        MT  FDN x 10' FDN 15' 10'   bike 5' 5'  5'   Stretches        HSS 30"x3 B 30"x3 B 30"x3 B 3x30" B   piriformis 30"x3 B 30"x3 B 30"x3 B 2x30" B   QL  3x30" R MT/sit B 3x30" B   Supine:        TAs  5"x1`5 5"x15 5"x12 5"x10    LTR  5" hold x 2'  W/grn PB 5" hold x2' w/PB 5" hold x 2' 5" hold 2'   March   held 30"x1    (D) KTC 5" hold x 2' w/grn PB 5" hold x 2' w/PB 5"hold x 2'  W/ Grn PB 5" hold 2' w/PB   Bug    --    Bridge  2x15 2x15 2x10  2x10   SLR  1x10 B - Next?    SL hip abduction 2x10 B 1x10 B     Prone:        Alt LE        Alt LE/UE        Seated:        TAs  5"x12  5"x10    March  2x10  --    LAQs  2x10 Next? --    Lats  GTB 2x15 GTB 2x15  GTB 2x15    Rows  GTB 2x15 GTB 2x15 GTB 2x15 GTB 2x15   UBE    --    Leg Press  Next Next? --  " "  Initials  DH 1/6 CK DH 1/6 CK       Assessment:     Patient able to increase activity with expanded core stabilization in sitting.  See assessment of FDN by Edin Song, PT, DPT.  Patient reports "I hope I will feel good like this all weekend."    Patient Education/Response:     Patient educated to continue HEP.  Verbalized understanding.  Edu not to overdo activity while getting relief.  Verbalized understanding.    Plans and Goals:     Continue per POC, progress as tolerated    Short Term Goals (4 Weeks): 4/13/18  1. Pt will be compliant with HEP to supplement PT in decreasing pain with functional mobility.  2. Pt will perform and hold TA contraction for 10x10" in dynamic sitting activities to demonstrate improved core strength  3. Pt will improve lumbar ROM by 10 deg in all planes to improve functional mobility.     Long Term Goals (8 Weeks): 5/13/18  1. Pt will improve FOTO score to </= 50% limited to decrease perceived limitation with maintaining/changing body position  2. Pt will perform and hold TA contraction for 10x10" in dynamic standing activities to demonstrate improved core strength  3. Pt will improve impaired LE MMTs to 5/5 to improve strength for functional tasks.  4. Pt will report lumbar pain </= 5/10 during lifting activities to promote functional QOL.  5. Pt will report lumbar  pain </= 5/10 during lumbar ROM to promote functional mobility.  6. Pt will report</= 4/10 pain in (R) knee when performing ADLs in order to be able to perform ADLs with less difficulty  "

## 2018-04-06 NOTE — PROGRESS NOTES
TIME RECORD    Date:  4/6/2018      Start Time:  0940   Stop Time:  0950    PROCEDURES:    TIMED  Procedure Min.   MT 10                     UNTIMED  Procedure Min.             Total Timed Minutes:  10  Total Timed Units:  1  Total Untimed Units:  0  Charges Billed/# of units:  1 MT       Progress/Current Status    Subjective:     Patient ID: Medhat Nunez is a 63 y.o. male.  Diagnosis:   1. Lumbar pain     2. Chronic pain of both knees     3. Poor posture       Reports 2.5 days of relief after the first FDN treatment. Agreeable to FDN    Objective:   Palpation: (+) ttp with increased tissue tension B lumbar paraspinals and multidus.   Patient provided written and verbal consent to FDN. MT x 10 consisting of FDN to B L4 and 5 multifidus with estim to both in prone over pillow.     Assessment:     Patient demonstrated appropriate response to FDN with good contractions noted in treated muscle groups. Patient with reports of decreased pain after fdn not specific to any scale    Patient Education/Response:         Plans and Goals:     Monitor response to FDN. Continue with FDN in POC as tolerated.

## 2018-04-10 ENCOUNTER — CLINICAL SUPPORT (OUTPATIENT)
Dept: REHABILITATION | Facility: HOSPITAL | Age: 63
End: 2018-04-10
Attending: INTERNAL MEDICINE
Payer: MEDICARE

## 2018-04-10 DIAGNOSIS — M54.50 LUMBAR PAIN: ICD-10-CM

## 2018-04-10 DIAGNOSIS — M25.562 CHRONIC PAIN OF BOTH KNEES: ICD-10-CM

## 2018-04-10 DIAGNOSIS — G89.29 CHRONIC PAIN OF BOTH KNEES: ICD-10-CM

## 2018-04-10 DIAGNOSIS — R29.3 POOR POSTURE: ICD-10-CM

## 2018-04-10 DIAGNOSIS — M25.561 CHRONIC PAIN OF BOTH KNEES: ICD-10-CM

## 2018-04-10 PROCEDURE — 97110 THERAPEUTIC EXERCISES: CPT | Mod: PN

## 2018-04-10 PROCEDURE — 97140 MANUAL THERAPY 1/> REGIONS: CPT | Mod: PN

## 2018-04-10 NOTE — PROGRESS NOTES
"DAILY TREATMENT NOTE    DATE: 4/10/2018    Start Time:  9:00 am   Stop Time:  9:54 am     PROCEDURES:    TIMED  Procedure Min.   TE supervised 5' NC   TE 49'                  UNTIMED  Procedure Min.             Total Timed Minutes:  49'  Total Timed Units:  3  Total Untimed Units:  0  Charges Billed/# of units:  3 TE      Progress/Current Status    Subjective:     Patient ID: Medhat Nunez is a 63 y.o. male.  Diagnosis:   1. Lumbar pain     2. Chronic pain of both knees     3. Poor posture       Pain: 7 /10 prior to therapy session   Pt stated that he has noticed great improvement in his pain in center of his back since FDN. Pt stated that he is now experiencing pain in (B) sides of his lower back, (R) > (L), but overall stated his back is feeling much better since FDN. Pt stated that (B) knees have been feeling much better.     Objective:     Pt participated in therex per log below 1:1 with PT x 49' and bike x 5' supervised. FDN performed  by Tina Field PT (see note).      Date  4/10/18 4/6/18 4/3/18 3/27/18  3/23/18   Visit  6/12 5/12 4/12 3/12 2/12   POC update  5/13/18 5/13/18 5/13/18 5/13/18 3/13/18   G code  6/10 5/10 4/10 3/10 2/10   FOTO 6/10 5/5 DONE 4/5 3/5    Visit Amt  118.42   88.10 118.42   57.78 57.78   Total Amt  523.38 404.96 316.86 198.44 140.66   MHP         MT  FDN see note FDN x 10' FDN 15' 10'   bike 5'  5' 5'  5'   Stretches         HSS 30"X3 B 30"x3 B 30"x3 B 30"x3 B 3x30" B   piriformis 30"x3 B 30"x3 B 30"x3 B 30"x3 B 2x30" B   QL 3x30" B  3x30" R MT/sit B 3x30" B   Supine:         TAs  5"x20 5"x1`5 5"x15 5"x12 5"x10    LTR  5" hold x 2'  W/grn PB L only 5" hold x 2'  W/grn PB 5" hold x2' w/PB 5" hold x 2' 5" hold 2'   March    held 30"x1    (D) KTC 5" hold x 2' w/grn PB 5" hold x 2' w/grn PB 5" hold x 2' w/PB 5"hold x 2'  W/ Grn PB 5" hold 2' w/PB   Bug     --    Bridge  2x15  2x15 2x15 2x10  2x10   SLR  2x10 B 1x10 B - Next?    SL hip abduction 2x10 B 2x10 B 1x10 B     Prone:  " "       Alt LE         Alt LE/UE         Seated:         TAs  - 5"x12  5"x10    March  2x15  2x10  --    LAQs  - 2x10 Next? --    Lats  GTB 2x15 GTB 2x15 GTB 2x15  GTB 2x15    Rows  GTB 2x15  GTB 2x15 GTB 2x15 GTB 2x15 GTB 2x15   UBE     --    Leg Press  7.0 2x10  Next Next? --    Initials  CK  DH 1/6 CK DH 1/6 CK       Assessment:     LTR to (R) held 2/2 to c/o increased pain when rotating to (R). Pt was able to tolerate all other therex without c/o increased pain. Pt reported decreased pain at the end of therapy session after FDN performed.     Patient Education/Response:     Continue with HEP    Plans and Goals:     Continue per POC, progress as tolerated    Short Term Goals (4 Weeks): 4/13/18  1. Pt will be compliant with HEP to supplement PT in decreasing pain with functional mobility.  2. Pt will perform and hold TA contraction for 10x10" in dynamic sitting activities to demonstrate improved core strength  3. Pt will improve lumbar ROM by 10 deg in all planes to improve functional mobility.     Long Term Goals (8 Weeks): 5/13/18  1. Pt will improve FOTO score to </= 50% limited to decrease perceived limitation with maintaining/changing body position  2. Pt will perform and hold TA contraction for 10x10" in dynamic standing activities to demonstrate improved core strength  3. Pt will improve impaired LE MMTs to 5/5 to improve strength for functional tasks.  4. Pt will report lumbar pain </= 5/10 during lifting activities to promote functional QOL.  5. Pt will report lumbar  pain </= 5/10 during lumbar ROM to promote functional mobility.  6. Pt will report</= 4/10 pain in (R) knee when performing ADLs in order to be able to perform ADLs with less difficulty  "

## 2018-04-13 ENCOUNTER — CLINICAL SUPPORT (OUTPATIENT)
Dept: REHABILITATION | Facility: HOSPITAL | Age: 63
End: 2018-04-13
Attending: INTERNAL MEDICINE
Payer: MEDICARE

## 2018-04-13 DIAGNOSIS — R29.3 POOR POSTURE: ICD-10-CM

## 2018-04-13 DIAGNOSIS — G89.29 CHRONIC PAIN OF BOTH KNEES: ICD-10-CM

## 2018-04-13 DIAGNOSIS — M25.561 CHRONIC PAIN OF BOTH KNEES: ICD-10-CM

## 2018-04-13 DIAGNOSIS — M54.50 LUMBAR PAIN: ICD-10-CM

## 2018-04-13 DIAGNOSIS — M25.562 CHRONIC PAIN OF BOTH KNEES: ICD-10-CM

## 2018-04-13 PROCEDURE — 97110 THERAPEUTIC EXERCISES: CPT | Mod: PN

## 2018-04-13 PROCEDURE — 97140 MANUAL THERAPY 1/> REGIONS: CPT | Mod: PN

## 2018-04-13 NOTE — PROGRESS NOTES
"DAILY TREATMENT NOTE    DATE: 4/13/2018    Start Time:  9:05 am   Stop Time:  9:44 am     PROCEDURES:    TIMED  Procedure Min.   TE supervised 25' NC   TE 14'                  UNTIMED  Procedure Min.             Total Timed Minutes:  14'  Total Timed Units:  1  Total Untimed Units:  0  Charges Billed/# of units:  1 TE      Progress/Current Status    Subjective:     Patient ID: Medhat Nunez is a 63 y.o. male.  Diagnosis:   1. Lumbar pain     2. Chronic pain of both knees     3. Poor posture       Pain: 5 /10 (L), 7/10 (R) lumbar region prior to therapy session  Pt stated that his back is continuing to feel better from FDN. Pt stated that he was still experiencing pain in (R) > (L) lumbar region, but the pain is less.     Objective:     Pt participated in therex per log below supervised x 25' and 1:1 with PT x 14'.  FDN performed by Edin Song, PT (see note).      Date  4/13/18 4/10/18 4/6/18 4/3/18 3/27/18  3/23/18   Visit  7/12 6/12 5/12 4/12 3/12 2/12   POC update  5/13/18 5/13/18 5/13/18 5/13/18 5/13/18 3/13/18   G code  7/10 6/10 5/10 4/10 3/10 2/10   FOTO 7/10 6/10 5/5 DONE 4/5 3/5    Visit Amt  57.78 118.42   88.10 118.42   57.78 57.78   Total Amt  581.16 523.38 404.96 316.86 198.44 140.66   MHP          MT  FDN see note FDN see note FDN x 10' FDN 15' 10'   bike 7' 5'  5' 5'  5'   Stretches          HSS 30"x3 B 30"X3 B 30"x3 B 30"x3 B 30"x3 B 3x30" B   piriformis 30"x3 B 30"x3 B 30"x3 B 30"x3 B 30"x3 B 2x30" B   QL  3x30" B  3x30" R MT/sit B 3x30" B   Supine:          TAs  5"x20 5"x20 5"x1`5 5"x15 5"x12 5"x10    LTR  5" hold x 2'  W/grn PB  5" hold x 2'  W/grn PB L only 5" hold x 2'  W/grn PB 5" hold x2' w/PB 5" hold x 2' 5" hold 2'   March     held 30"x1    (D) KTC 1.5 ' then held 2/2 to (R) groin pain 5" hold x 2' w/grn PB 5" hold x 2' w/grn PB 5" hold x 2' w/PB 5"hold x 2'  W/ Grn PB 5" hold 2' w/PB   Bug      --    Bridge  2x15  2x15  2x15 2x15 2x10  2x10   SLR  1# 2x10  2x10 B 1x10 B - Next?  " "  SL hip abduction 1# 2x10 B 2x10 B 2x10 B 1x10 B     Prone:          Alt LE          Alt LE/UE          Seated:          TAs   - 5"x12  5"x10    March   2x15  2x10  --    LAQs   - 2x10 Next? --    Lats   GTB 2x15 GTB 2x15 GTB 2x15  GTB 2x15    Rows   GTB 2x15  GTB 2x15 GTB 2x15 GTB 2x15 GTB 2x15   UBE      --    Leg Press   7.0 2x10  Next Next? --    Initials  CK CK  DH 1/6 CK DH 1/6 CK       Assessment:     Pt continues to report gradual improvement in his lumbar pain. Pt was able to tolerate all therex without c/o increased pain. Pt reported decreased lumbar pain at the end of therapy session.     Patient Education/Response:     Continue with HEP     Plans and Goals:     Continue per POC, progress as tolerated    Short Term Goals (4 Weeks): 4/13/18  1. Pt will be compliant with HEP to supplement PT in decreasing pain with functional mobility.  2. Pt will perform and hold TA contraction for 10x10" in dynamic sitting activities to demonstrate improved core strength  3. Pt will improve lumbar ROM by 10 deg in all planes to improve functional mobility.     Long Term Goals (8 Weeks): 5/13/18  1. Pt will improve FOTO score to </= 50% limited to decrease perceived limitation with maintaining/changing body position  2. Pt will perform and hold TA contraction for 10x10" in dynamic standing activities to demonstrate improved core strength  3. Pt will improve impaired LE MMTs to 5/5 to improve strength for functional tasks.  4. Pt will report lumbar pain </= 5/10 during lifting activities to promote functional QOL.  5. Pt will report lumbar  pain </= 5/10 during lumbar ROM to promote functional mobility.  6. Pt will report</= 4/10 pain in (R) knee when performing ADLs in order to be able to perform ADLs with less difficulty  "

## 2018-04-13 NOTE — PROGRESS NOTES
TIME RECORD    Date:  4/13/2018      Start Time:  0945   Stop Time:  0955    PROCEDURES:    TIMED  Procedure Min.   MT 10                     UNTIMED  Procedure Min.             Total Timed Minutes:  10  Total Timed Units:  1  Total Untimed Units:  0  Charges Billed/# of units:  1 MT       Progress/Current Status    Subjective:     Patient ID: Medhat Nunez is a 63 y.o. male.  Diagnosis:   1. Lumbar pain     2. Chronic pain of both knees     3. Poor posture       Reports continued relief from the needling. Reports more right sided pain than left sided pain . Agreeable to FDN    Objective:   Palpation: (+) ttp with increased tissue tension R>L lumbar paraspinals, multifidus, and QL  Patient provided verbal consent to FDN. MT x 10 consisting of FDN to B L4  multifidus with estim to both in prone over pillow, L  QL with estim in prone over pillow.     Assessment:     Patient demonstrated appropriate response to FDN with good contractions noted in treated muscle groups. Patient with reports of decreased pain after fdn not specific to any scale    Patient Education/Response:         Plans and Goals:     Monitor response to FDN. Continue with FDN in POC as tolerated.

## 2018-04-17 ENCOUNTER — CLINICAL SUPPORT (OUTPATIENT)
Dept: REHABILITATION | Facility: HOSPITAL | Age: 63
End: 2018-04-17
Attending: INTERNAL MEDICINE
Payer: MEDICARE

## 2018-04-17 DIAGNOSIS — R29.3 POOR POSTURE: ICD-10-CM

## 2018-04-17 DIAGNOSIS — M54.50 LUMBAR PAIN: ICD-10-CM

## 2018-04-17 DIAGNOSIS — M25.562 CHRONIC PAIN OF BOTH KNEES: ICD-10-CM

## 2018-04-17 DIAGNOSIS — M25.561 CHRONIC PAIN OF BOTH KNEES: ICD-10-CM

## 2018-04-17 DIAGNOSIS — G89.29 CHRONIC PAIN OF BOTH KNEES: ICD-10-CM

## 2018-04-17 PROCEDURE — 97140 MANUAL THERAPY 1/> REGIONS: CPT | Mod: PN

## 2018-04-17 PROCEDURE — 97110 THERAPEUTIC EXERCISES: CPT | Mod: PN

## 2018-04-17 NOTE — PROGRESS NOTES
"TIME RECORD    Date:  04/17/2018    Start Time:  10:00 AM  Stop Time:  11:00 AM    PROCEDURES:    TIMED  Procedure Min.   FDN 25'   therex 20   therex supervised 15' NC             UNTIMED  Procedure Time Min.    Start:  Stop:     Start:  Stop:      Total Timed Minutes:  45  Total Timed Units:  3  Total Untimed Units:  0  Charges Billed/# of units:  3 (MT=2, TE=1)      Progress/Current Status    Subjective:     Patient ID: Medhat Nunez is a 63 y.o. male.  Diagnosis:   1. Lumbar pain     2. Chronic pain of both knees     3. Poor posture       Pain: Patient reports that his pain about a 7/10 in his back coming in. Patent states that the FDN helped, but     Objective:     Patient gave verbal consent for FDN. Provided FDN to B L3 & L4 multifidus coupled with estim for multiple bouts. Also, FDN to B erector spinae for multiple bouts. Patient then performed therex per exercise log 1:1 with PT for 20 minutes followed by 15 minutes of supervised therex with PT tech.      Date  4/17/18 4/13/18 4/10/18 4/6/18 4/3/18 3/27/18  3/23/18   Visit  8/12 7/12 6/12 5/12 4/12 3/12 2/12   POC update  5/13/18 5/13/18 5/13/18 5/13/18 5/13/18 5/13/18 3/13/18   G code  8/10 7/10 6/10 5/10 4/10 3/10 2/10   FOTO 8/10 7/10 6/10 5/5 DONE 4/5 3/5    Visit Amt  91.70 57.78 118.42   88.10 118.42   57.78 57.78   Total Amt  672.86 581.16 523.38 404.96 316.86 198.44 140.66   MHP           MT  FDN see note FDN see note FDN see note FDN x 10' FDN 15' 10'   bike 7' 7' 5'  5' 5'  5'   Stretches           HSS 3x30" B 30"x3 B 30"X3 B 30"x3 B 30"x3 B 30"x3 B 3x30" B   piriformis 3x30" B 30"x3 B 30"x3 B 30"x3 B 30"x3 B 30"x3 B 2x30" B   QL 3x30" B  3x30" B  3x30" R MT/sit B 3x30" B   Supine:           TAs  10"x10 5"x20 5"x20 5"x1`5 5"x15 5"x12 5"x10    LTR  5" holdx 2 w/ grn PB   5" hold x 2'  W/grn PB  5" hold x 2'  W/grn PB L only 5" hold x 2'  W/grn PB 5" hold x2' w/PB 5" hold x 2' 5" hold 2'   March      held 30"x1    (D) KTC 5" hold x2' w/ grn PB 1.5 " "' then held 2/2 to (R) groin pain 5" hold x 2' w/grn PB 5" hold x 2' w/grn PB 5" hold x 2' w/PB 5"hold x 2'  W/ Grn PB 5" hold 2' w/PB   Bug  --     --    Bridge  2x15 2x15  2x15  2x15 2x15 2x10  2x10   SLR  1# 2x15 1# 2x10  2x10 B 1x10 B - Next?    SL hip abduction 1# 2x15 B 1# 2x10 B 2x10 B 2x10 B 1x10 B     Prone:           Alt LE  x10 B         Alt LE/UE  --         Seated:           TAs    - 5"x12  5"x10    March  2x15   2x15  2x10  --    LAQs  --  - 2x10 Next? --    Lats  GTB 2x15  GTB 2x15 GTB 2x15 GTB 2x15  GTB 2x15    Rows  GTB 2x15  GTB 2x15  GTB 2x15 GTB 2x15 GTB 2x15 GTB 2x15   UBE  --     --    Leg Press  7.0 2x10  7.0 2x10  Next Next? --    Initials  JBF CK CK  DH 1/6 CK DH 1/6 CK         Assessment:     Patient tolerated treatment well. Patient demonstrated good muscle contraction with FDN to lumbar multifidus and erector spinae.     Patient Education/Response:     Continue current HEP.    Plans and Goals:     Continue PT POC as previously established. Progress MT and therex as tolerated.    Short Term Goals (4 Weeks): 4/13/18  1. Pt will be compliant with HEP to supplement PT in decreasing pain with functional mobility.  2. Pt will perform and hold TA contraction for 10x10" in dynamic sitting activities to demonstrate improved core strength  3. Pt will improve lumbar ROM by 10 deg in all planes to improve functional mobility.     Long Term Goals (8 Weeks): 5/13/18  1. Pt will improve FOTO score to </= 50% limited to decrease perceived limitation with maintaining/changing body position  2. Pt will perform and hold TA contraction for 10x10" in dynamic standing activities to demonstrate improved core strength  3. Pt will improve impaired LE MMTs to 5/5 to improve strength for functional tasks.  4. Pt will report lumbar pain </= 5/10 during lifting activities to promote functional QOL.  5. Pt will report lumbar  pain </= 5/10 during lumbar ROM to promote functional mobility.  6. Pt will report</= 4/10 " pain in (R) knee when performing ADLs in order to be able to perform ADLs with less difficulty

## 2018-04-20 ENCOUNTER — CLINICAL SUPPORT (OUTPATIENT)
Dept: REHABILITATION | Facility: HOSPITAL | Age: 63
End: 2018-04-20
Attending: INTERNAL MEDICINE
Payer: MEDICARE

## 2018-04-20 DIAGNOSIS — M25.562 CHRONIC PAIN OF BOTH KNEES: ICD-10-CM

## 2018-04-20 DIAGNOSIS — G89.29 CHRONIC PAIN OF BOTH KNEES: ICD-10-CM

## 2018-04-20 DIAGNOSIS — R29.3 POOR POSTURE: ICD-10-CM

## 2018-04-20 DIAGNOSIS — M54.50 LUMBAR PAIN: ICD-10-CM

## 2018-04-20 DIAGNOSIS — M25.561 CHRONIC PAIN OF BOTH KNEES: ICD-10-CM

## 2018-04-20 PROCEDURE — 97110 THERAPEUTIC EXERCISES: CPT | Mod: PN

## 2018-04-20 NOTE — PROGRESS NOTES
"DAILY TREATMENT NOTE    DATE: 4/20/2018    Start Time:  9:05 am   Stop Time:  10:00 am     PROCEDURES:    TIMED  Procedure Min.   TE supervised 5' NC   TE 50'                 UNTIMED  Procedure Min.             Total Timed Minutes:  50'  Total Timed Units:  3  Total Untimed Units:  0  Charges Billed/# of units:  3 TE      Progress/Current Status    Subjective:     Patient ID: Medhat Nunez is a 63 y.o. male.  Diagnosis:   1. Lumbar pain     2. Chronic pain of both knees     3. Poor posture       Pain: 6 /10 across (B) lumbar region   Pt stated that he has been getting a few days of relief from FDN, but started experiencing pain again when he woke up today.  Pt stated that this pain is still not as bad as it used to be. Pt stated that yesterday he went to the grocery and helped carry groceries.  Pt stated that he has been compliant with HEP and that none of the exercises increase his pain.      Objective:   Pt initiated therapy session on stationary bike x 5' supervised. Objective measurements were taken and pt participated in therex per log below 1:1 with PT x 50'.     Date  4/20/18 4/17/18 4/13/18 4/10/18 4/6/18 4/3/18 3/27/18  3/23/18   Visit  9/12 8/12 7/12 6/12 5/12 4/12 3/12 2/12   POC update  5/13/18 5/13/18 5/13/18 5/13/18 5/13/18 5/13/18 5/13/18 3/13/18   G code  9/10 8/10 7/10 6/10 5/10 4/10 3/10 2/10   FOTO 9/10 8/10 7/10 6/10 5/5 DONE 4/5 3/5    Visit Amt  90.96 91.70 57.78 118.42   88.10 118.42   57.78 57.78   Total Amt  763.82 672.86 581.16 523.38 404.96 316.86 198.44 140.66   MHP            MT   FDN see note FDN see note FDN see note FDN x 10' FDN 15' 10'   bike 5'  7' 7' 5'  5' 5'  5'   Stretches            HSS 3x30" B 3x30" B 30"x3 B 30"X3 B 30"x3 B 30"x3 B 30"x3 B 3x30" B   piriformis 3x30" B 3x30" B 30"x3 B 30"x3 B 30"x3 B 30"x3 B 30"x3 B 2x30" B   QL 3x30" B 3x30" B  3x30" B  3x30" R MT/sit B 3x30" B   Supine:            TAs  10"x10  10"x10 5"x20 5"x20 5"x1`5 5"x15 5"x12 5"x10    LTR  5' hold " "w/PB 5" holdx 2 w/ grn PB   5" hold x 2'  W/grn PB  5" hold x 2'  W/grn PB L only 5" hold x 2'  W/grn PB 5" hold x2' w/PB 5" hold x 2' 5" hold 2'   March       held 30"x1    (D) KTC 5" hold 2' w/ PB 5" hold x2' w/ grn PB 1.5 ' then held 2/2 to (R) groin pain 5" hold x 2' w/grn PB 5" hold x 2' w/grn PB 5" hold x 2' w/PB 5"hold x 2'  W/ Grn PB 5" hold 2' w/PB   Bug   --     --    Bridge  2x15 , 3" hold 2x15 2x15  2x15  2x15 2x15 2x10  2x10   SLR  1# 2x15 1# 2x15 1# 2x10  2x10 B 1x10 B - Next?    SL hip abduction  1# 2x15 B 1# 2x10 B 2x10 B 2x10 B 1x10 B     Prone:            Alt LE  x10 B x10 B         Alt LE/UE   --         Seated:            TAs     - 5"x12  5"x10    March   2x15   2x15  2x10  --    LAQs   --  - 2x10 Next? --    Lats   GTB 2x15  GTB 2x15 GTB 2x15 GTB 2x15  GTB 2x15    Rows   GTB 2x15  GTB 2x15  GTB 2x15 GTB 2x15 GTB 2x15 GTB 2x15   UBE   --     --    Leg Press   7.0 2x10  7.0 2x10  Next Next? --    Initials  CK JBF CK CK  DH 1/6 CK DH 1/6 CK         Range of Motion/Strength:      Lumbar AROM: Pain/Dysfunction with Movement:   Flexion 55 degrees    Extension 10 degrees C/o mild pain in (L) lumbar region   Right side bending 20 degrees C/o mild  increased pain in (L) lumbar/ QL region   Left side bending 20 degrees C/o mild increased pain in (R) lumbar/QL region   Right rotation 30 degrees C/o mild increased pain in (R) lumbar region   Left rotation 30 degrees C/o mild increased pain in (R) lumbar region       Hip Right   Left   Pain/Dysfunction with Movement     AROM MMT AROM MMT     Flexion WFL 5/5 WFL 5/5     Extension NT NT NT NT Able to perform fair bridge against gravity, c/o pain with bridge   Abduction WFL 4+/5 WFL 4+/5        Knee   Right     Left   Pain/Dysfunction with Movement     AROM PROM MMT AROM PROM MMT     Flexion 132 NT 5/5 140 NT 5/5     Extension 2 NT 5/5 2 NT 5/5        Ankle Right   Left   Pain/Dysfunction with Movement     AROM MMT AROM MMT     Plantarflexion WFL 5/5 WFL 5/5   " "  Dorsiflexion WFL 5/5 WFL 5/5         Assessment:     Pt demo improved lumbar AROM with decreased c/o pain when performing lumbar AROM compared to measurements taken on initial evaluation. Pt is making steady progress with his PT treatments, but still presents with c/o lumbar pain. Pt will continue to benefit from skilled PT for core strengthening, lumbar strengthening/stretching/stabilization, postural strengthening/education, LE strengthening/stretching, and FDN when available.     Patient Education/Response:     Continue with HEP     Plans and Goals:     Continue PT POC as previously established. Progress MT and therex as tolerated.    Short Term Goals (4 Weeks): 4/13/18  1. Pt will be compliant with HEP to supplement PT in decreasing pain with functional mobility.  2. Pt will perform and hold TA contraction for 10x10" in dynamic sitting activities to demonstrate improved core strength  3. Pt will improve lumbar ROM by 10 deg in all planes to improve functional mobility.     Long Term Goals (8 Weeks): 5/13/18  1. Pt will improve FOTO score to </= 50% limited to decrease perceived limitation with maintaining/changing body position  2. Pt will perform and hold TA contraction for 10x10" in dynamic standing activities to demonstrate improved core strength  3. Pt will improve impaired LE MMTs to 5/5 to improve strength for functional tasks.  4. Pt will report lumbar pain </= 5/10 during lifting activities to promote functional QOL.  5. Pt will report lumbar  pain </= 5/10 during lumbar ROM to promote functional mobility.  6. Pt will report</= 4/10 pain in (R) knee when performing ADLs in order to be able to perform ADLs with less difficulty    "

## 2018-04-23 ENCOUNTER — CLINICAL SUPPORT (OUTPATIENT)
Dept: REHABILITATION | Facility: HOSPITAL | Age: 63
End: 2018-04-23
Attending: INTERNAL MEDICINE
Payer: MEDICARE

## 2018-04-23 DIAGNOSIS — M54.50 LUMBAR PAIN: ICD-10-CM

## 2018-04-23 DIAGNOSIS — M25.561 CHRONIC PAIN OF BOTH KNEES: ICD-10-CM

## 2018-04-23 DIAGNOSIS — R29.3 POOR POSTURE: ICD-10-CM

## 2018-04-23 DIAGNOSIS — M25.562 CHRONIC PAIN OF BOTH KNEES: ICD-10-CM

## 2018-04-23 DIAGNOSIS — G89.29 CHRONIC PAIN OF BOTH KNEES: ICD-10-CM

## 2018-04-23 PROCEDURE — 97110 THERAPEUTIC EXERCISES: CPT | Mod: PN

## 2018-04-23 PROCEDURE — 97140 MANUAL THERAPY 1/> REGIONS: CPT | Mod: PN

## 2018-04-23 NOTE — PROGRESS NOTES
"DAILY TREATMENT NOTE    DATE: 4/23/2018    Start Time:  8:45 am   Stop Time:  9:30am     PROCEDURES:    TIMED  Procedure Min.   TE 35   MT 10                 UNTIMED  Procedure Min.             Total Timed Minutes:  45  Total Timed Units:  3  Total Untimed Units:  0  Charges Billed/# of units:  2 TE, 1mT      Progress/Current Status    Subjective:     Patient ID: Medhat Nunez is a 63 y.o. male.  Diagnosis:   1. Lumbar pain     2. Chronic pain of both knees     3. Poor posture       Pain: pt reports his last FDN session was successful. He reports minor complaints of soreness to left mid lower back today. Pt agreeable to PT session.     Objective:   Pt completed all therex as per logged below 1:1 w/ PTA. Pt then positioned in right sidelying for manual therapy to Left mid lumbosacral paraspinals, down to superior gluteal musculature.   Date  4/23/18 4/20/18 4/17/18 4/13/18 4/10/18 4/6/18 4/3/18 3/27/18  3/23/18   Visit  10/12 9/12 8/12 7/12 6/12 5/12 4/12 3/12 2/12   POC update  5/13/18 5/13/18 5/13/18 5/13/18 5/13/18 5/13/18 5/13/18 5/13/18 3/13/18   G code  1/10 9/10 8/10 7/10 6/10 5/10 4/10 3/10 2/10   FOTO 1/10 DONE 9/10 8/10 7/10 6/10 5/5 DONE 4/5 3/5    Visit Amt  88.10 90.96 91.70 57.78 118.42   88.10 118.42   57.78 57.78   Total Amt  851.92 763.82 672.86 581.16 523.38 404.96 316.86 198.44 140.66   MHP             MT  10  FDN see note FDN see note FDN see note FDN x 10' FDN 15' 10'   bike NT 5'  7' 7' 5'  5' 5'  5'   Stretches             HSS 30"x3 B 3x30" B 3x30" B 30"x3 B 30"X3 B 30"x3 B 30"x3 B 30"x3 B 3x30" B   piriformis 30"x3 B 3x30" B 3x30" B 30"x3 B 30"x3 B 30"x3 B 30"x3 B 30"x3 B 2x30" B   QL 30"x3 B 3x30" B 3x30" B  3x30" B  3x30" R MT/sit B 3x30" B   Supine:             TAs  10"x10 10"x10  10"x10 5"x20 5"x20 5"x1`5 5"x15 5"x12 5"x10    LTR  5' hold w/PB 5' hold w/PB 5" holdx 2 w/ grn PB   5" hold x 2'  W/grn PB  5" hold x 2'  W/grn PB L only 5" hold x 2'  W/grn PB 5" hold x2' w/PB 5" hold x 2' 5" " "hold 2'   March        held 30"x1    (D) KTC 5' hold w/PB 5" hold 2' w/ PB 5" hold x2' w/ grn PB 1.5 ' then held 2/2 to (R) groin pain 5" hold x 2' w/grn PB 5" hold x 2' w/grn PB 5" hold x 2' w/PB 5"hold x 2'  W/ Grn PB 5" hold 2' w/PB   Bug    --     --    Bridge  5' hold 2x15 , 3" hold 2x15 2x15  2x15  2x15 2x15 2x10  2x10   SLR  1# 2x15 1# 2x15 1# 2x15 1# 2x10  2x10 B 1x10 B - Next?    SL hip abduction 1# 2x15 B  1# 2x15 B 1# 2x10 B 2x10 B 2x10 B 1x10 B     Prone:             Alt LE  x10 B x10 B x10 B         Alt LE/UE    --         Seated:             TAs      - 5"x12  5"x10    March    2x15   2x15  2x10  --    LAQs    --  - 2x10 Next? --    Lats  GTB 2x15  GTB 2x15  GTB 2x15 GTB 2x15 GTB 2x15  GTB 2x15    Rows  GTB 2x15  GTB 2x15  GTB 2x15  GTB 2x15 GTB 2x15 GTB 2x15 GTB 2x15   UBE    --     --    Leg Press    7.0 2x10  7.0 2x10  Next Next? --    Initials  JA 1/6 CK JBF CK CK  DH 1/6 CK DH 1/6 CK             Assessment:     Pt tolerated session with no reports of increased left mid lower back pain today. Responded well to manual therapy today, stating he feels less "tension".     Patient Education/Response:     Continue with HEP     Plans and Goals:     Continue PT as POC    Short Term Goals (4 Weeks): 4/13/18  1. Pt will be compliant with HEP to supplement PT in decreasing pain with functional mobility.  2. Pt will perform and hold TA contraction for 10x10" in dynamic sitting activities to demonstrate improved core strength  3. Pt will improve lumbar ROM by 10 deg in all planes to improve functional mobility.     Long Term Goals (8 Weeks): 5/13/18  1. Pt will improve FOTO score to </= 50% limited to decrease perceived limitation with maintaining/changing body position  2. Pt will perform and hold TA contraction for 10x10" in dynamic standing activities to demonstrate improved core strength  3. Pt will improve impaired LE MMTs to 5/5 to improve strength for functional tasks.  4. Pt will report lumbar pain </= " 5/10 during lifting activities to promote functional QOL.  5. Pt will report lumbar  pain </= 5/10 during lumbar ROM to promote functional mobility.  6. Pt will report</= 4/10 pain in (R) knee when performing ADLs in order to be able to perform ADLs with less difficulty

## 2018-04-27 ENCOUNTER — CLINICAL SUPPORT (OUTPATIENT)
Dept: REHABILITATION | Facility: HOSPITAL | Age: 63
End: 2018-04-27
Attending: INTERNAL MEDICINE
Payer: MEDICARE

## 2018-04-27 DIAGNOSIS — M54.50 LUMBAR PAIN: ICD-10-CM

## 2018-04-27 DIAGNOSIS — M25.562 CHRONIC PAIN OF BOTH KNEES: ICD-10-CM

## 2018-04-27 DIAGNOSIS — R29.3 POOR POSTURE: ICD-10-CM

## 2018-04-27 DIAGNOSIS — G89.29 CHRONIC PAIN OF BOTH KNEES: ICD-10-CM

## 2018-04-27 DIAGNOSIS — M25.561 CHRONIC PAIN OF BOTH KNEES: ICD-10-CM

## 2018-04-27 PROCEDURE — 97140 MANUAL THERAPY 1/> REGIONS: CPT | Mod: PN

## 2018-04-27 PROCEDURE — 97110 THERAPEUTIC EXERCISES: CPT | Mod: PN

## 2018-04-27 NOTE — PROGRESS NOTES
TIME RECORD    Date:  4/27/2018      Start Time:  1305   Stop Time:  1315    PROCEDURES:    TIMED  Procedure Min.   MT 10                     UNTIMED  Procedure Min.             Total Timed Minutes:  10  Total Timed Units:  1  Total Untimed Units:  0  Charges Billed/# of units:  1 MT       Progress/Current Status    Subjective:     Patient ID: Medhat Nunez is a 63 y.o. male.  Diagnosis:   1. Lumbar pain     2. Chronic pain of both knees     3. Poor posture       Reports continued relief from the needling.  Agreeable to FDN    Objective:   Palpation: (+) ttp with increased tissue tension B  lumbar paraspinals, multifidus, and QL  Patient provided verbal consent to FDN. MT x 10 consisting of FDN to B L4  multifidus with estim to both in prone over pillow, L  QL with estim in prone over pillow.     Assessment:     Patient demonstrated appropriate response to FDN with good contractions noted in treated muscle groups. Patient with reports of decreased pain after fdn not specific to any scale    Patient Education/Response:         Plans and Goals:     Monitor response to FDN. Continue with FDN in POC as tolerated.

## 2018-04-27 NOTE — PROGRESS NOTES
"DAILY TREATMENT NOTE    DATE: 4/27/2018    Start Time:  100  Stop Time:  200    PROCEDURES:    TIMED  Procedure Min.   Manual therapy 10   Therex 35                 UNTIMED  Procedure Min.   Bike 5'         Total Timed Minutes:  45  Total Timed Units:  3  Total Untimed Units:  0  Charges Billed/# of units:  3 TE (+1 MT by CATARINO Song, PT, DPT)      Progress/Current Status    Subjective:     Patient ID: Medhat Nunez is a 63 y.o. male.  Diagnosis:   1. Lumbar pain     2. Chronic pain of both knees     3. Poor posture       Patient reports FDN has helped tremendously.  No pain complaints today.    Objective:     Initiated session on bike for active warm up x 5 minutes with supervision.  FDN provided by Edin Song, PT, DPT.  See attached note for details.  Manual therapy provided in right sidelying x 10 minutes to Left mid lumbosacral paraspinals and QL, down to superior gluteal musculature. Performed all therex as per log with 1:1 by PTA x 35 minutes.  Increased reps and weight as noted.    Date  4/27/18 4/23/18 4/20/18 4/17/18 4/13/18 4/10/18 4/6/18 4/3/18 3/27/18  3/23/18   Visit  11/12 10/12 9/12 8/12 7/12 6/12 5/12 4/12 3/12 2/12   POC update  5/31/18 5/13/18 5/13/18 5/13/18 5/13/18 5/13/18 5/13/18 5/13/18 5/13/18 3/13/18   G code  2/10 1/10 9/10 8/10 7/10 6/10 5/10 4/10 3/10 2/10   FOTO 2/10 1/10 DONE 9/10 8/10 7/10 6/10 5/5 DONE 4/5 3/5    Visit Amt  118.42 88.10 90.96 91.70 57.78 118.42   88.10 118.42   57.78 57.78   Total Amt  950.34 851.92 763.82 672.86 581.16 523.38 404.96 316.86 198.44 140.66   MHP              MT  10 10  FDN see note FDN see note FDN see note FDN x 10' FDN 15' 10'   bike 5' NT 5'  7' 7' 5'  5' 5'  5'   Stretches              HSS 30"x3 B 30"x3 B 3x30" B 3x30" B 30"x3 B 30"X3 B 30"x3 B 30"x3 B 30"x3 B 3x30" B   piriformis 30"x3 B 30"x3 B 3x30" B 3x30" B 30"x3 B 30"x3 B 30"x3 B 30"x3 B 30"x3 B 2x30" B   QL 30"x3 B 30"x3 B 3x30" B 3x30" B  3x30" B  3x30" R MT/sit B 3x30" B " "  Supine:              TAs  10"x10 10"x10 10"x10  10"x10 5"x20 5"x20 5"x1`5 5"x15 5"x12 5"x10    LTR  5" hold x2' w/PB 5' hold w/PB 5' hold w/PB 5" holdx 2 w/ grn PB   5" hold x 2'  W/grn PB  5" hold x 2'  W/grn PB L only 5" hold x 2'  W/grn PB 5" hold x2' w/PB 5" hold x 2' 5" hold 2'   March         held 30"x1    (D) KTC 5" hold x2' w/PB 5" hold w/PB 5" hold 2' w/ PB 5" hold x2' w/ grn PB 1.5 ' then held 2/2 to (R) groin pain 5" hold x 2' w/grn PB 5" hold x 2' w/grn PB 5" hold x 2' w/PB 5"hold x 2'  W/ Grn PB 5" hold 2' w/PB   Bug  --   --     --    Bridge  5" hold  2x15 5"  Hold  2x15 2x15 , 3" hold 2x15 2x15  2x15  2x15 2x15 2x10  2x10   SLR  2# 2x10 B 1# 2x15 1# 2x15 1# 2x15 1# 2x10  2x10 B 1x10 B - Next?    SL hip abduction 2# 2x10 B 1# 2x15 B  1# 2x15 B 1# 2x10 B 2x10 B 2x10 B 1x10 B     Prone:              Alt LE  2x10 B x10 B x10 B x10 B         Alt LE/UE     --         Seated:              TAs       - 5"x12  5"x10    March     2x15   2x15  2x10  --    LAQs     --  - 2x10 Next? --    Lats  BTC 2x10 GTB 2x15  GTB 2x15  GTB 2x15 GTB 2x15 GTB 2x15  GTB 2x15    Rows  BTC 2x10 GTB 2x15  GTB 2x15  GTB 2x15  GTB 2x15 GTB 2x15 GTB 2x15 GTB 2x15   UBE     --     --    Leg Press  8.0 2x15 DL   7.0 2x10  7.0 2x10  Next Next? --    Initials  DH 1/6 JA 1/6 CK JBF CK CK  DH 1/6 CK  1/6 CK           Assessment:     Patient able to tolerate all activity including added reps and weight without complaint of pain or difficulty.  "I'm so happy - this is making a big difference.  I'm so happy to exercise again."  See note of Edin Song, PT, DPT for assessment of FDN.    Patient Education/Response:     Patient educated to continue HEP.  Verbalized understanding.    Plans and Goals:     Continue PT as POC    Short Term Goals (4 Weeks): 4/13/18  1. Pt will be compliant with HEP to supplement PT in decreasing pain with functional mobility.  2. Pt will perform and hold TA contraction for 10x10" in dynamic sitting " "activities to demonstrate improved core strength  3. Pt will improve lumbar ROM by 10 deg in all planes to improve functional mobility.     Long Term Goals (8 Weeks): 5/13/18  1. Pt will improve FOTO score to </= 50% limited to decrease perceived limitation with maintaining/changing body position  2. Pt will perform and hold TA contraction for 10x10" in dynamic standing activities to demonstrate improved core strength  3. Pt will improve impaired LE MMTs to 5/5 to improve strength for functional tasks.  4. Pt will report lumbar pain </= 5/10 during lifting activities to promote functional QOL.  5. Pt will report lumbar  pain </= 5/10 during lumbar ROM to promote functional mobility.  6. Pt will report</= 4/10 pain in (R) knee when performing ADLs in order to be able to perform ADLs with less difficulty  "

## 2018-05-02 ENCOUNTER — CLINICAL SUPPORT (OUTPATIENT)
Dept: REHABILITATION | Facility: HOSPITAL | Age: 63
End: 2018-05-02
Attending: INTERNAL MEDICINE
Payer: MEDICARE

## 2018-05-02 DIAGNOSIS — G89.29 CHRONIC PAIN OF BOTH KNEES: ICD-10-CM

## 2018-05-02 DIAGNOSIS — M25.561 CHRONIC PAIN OF BOTH KNEES: ICD-10-CM

## 2018-05-02 DIAGNOSIS — M54.50 LUMBAR PAIN: ICD-10-CM

## 2018-05-02 DIAGNOSIS — M25.562 CHRONIC PAIN OF BOTH KNEES: ICD-10-CM

## 2018-05-02 DIAGNOSIS — R29.3 POOR POSTURE: ICD-10-CM

## 2018-05-02 PROCEDURE — 97110 THERAPEUTIC EXERCISES: CPT | Mod: PN

## 2018-05-02 PROCEDURE — 97140 MANUAL THERAPY 1/> REGIONS: CPT | Mod: PN

## 2018-05-02 NOTE — PROGRESS NOTES
"DAILY TREATMENT NOTE    DATE: 5/2/2018    Start Time:  1100  Stop Time:  1145    PROCEDURES:    TIMED  Procedure Min.       Therex 40                 UNTIMED  Procedure Min.   Bike 5'         Total Timed Minutes:  40  Total Timed Units:  3  Total Untimed Units:  0  Charges Billed/# of units:  3 TE (+1 MT by CATARINO Song, PT, DPT)      Progress/Current Status    Subjective:     Patient ID: Medhat Nunez is a 63 y.o. male.  Diagnosis:   1. Lumbar pain     2. Chronic pain of both knees     3. Poor posture       Pt reports he woke up today with complaints of B Lower back soreness. He states FDN session helped, and lasted about 3 days. Pt agreeable to PT session.     Objective:     Initiated session on bike for active warm up x 5 minutes with supervision.  FDN provided by Edin Song, PT, DPT. ( See attached note for details)  Pt completed all therex as per logged in supine/ standing 1;1 w PTA, on moist heat for supine activities.     Date  5/218 4/27/18 4/23/18 4/20/18 4/17/18 4/13/18 4/10/18 4/6/18 4/3/18 3/27/18  3/23/18   Visit  12/12 11/12 10/12 9/12 8/12 7/12 6/12 5/12 4/12 3/12 2/12   POC update  5/31/48 5/31/18 5/13/18 5/13/18 5/13/18 5/13/18 5/13/18 5/13/18 5/13/18 5/13/18 3/13/18   G code  3/10 2/10 1/10 9/10 8/10 7/10 6/10 5/10 4/10 3/10 2/10   FOTO 3/10 2/10 1/10 DONE 9/10 8/10 7/10 6/10 5/5 DONE 4/5 3/5    Visit Amt  90.96 118.42 88.10 90.96 91.70 57.78 118.42   88.10 118.42   57.78 57.78   Total Amt  1041.30 950.34 851.92 763.82 672.86 581.16 523.38 404.96 316.86 198.44 140.66   MHP               MT  NT 10 10  FDN see note FDN see note FDN see note FDN x 10' FDN 15' 10'   bike  5' NT 5'  7' 7' 5'  5' 5'  5'   Stretches               HSS 30"x3 B 30"x3 B 30"x3 B 3x30" B 3x30" B 30"x3 B 30"X3 B 30"x3 B 30"x3 B 30"x3 B 3x30" B   piriformis 30"x3 B 30"x3 B 30"x3 B 3x30" B 3x30" B 30"x3 B 30"x3 B 30"x3 B 30"x3 B 30"x3 B 2x30" B   QL 30"x3 B 30"x3 B 30"x3 B 3x30" B 3x30" B  3x30" B  3x30" R MT/sit B " "3x30" B   Supine:               TAs  10'x10 10"x10 10"x10 10"x10  10"x10 5"x20 5"x20 5"x1`5 5"x15 5"x12 5"x10    LTR  5" hold x2' w/PB 5" hold x2' w/PB 5' hold w/PB 5' hold w/PB 5" holdx 2 w/ grn PB   5" hold x 2'  W/grn PB  5" hold x 2'  W/grn PB L only 5" hold x 2'  W/grn PB 5" hold x2' w/PB 5" hold x 2' 5" hold 2'   March          held 30"x1    (D) KTC 5" hold x2' w/PB 5" hold x2' w/PB 5" hold w/PB 5" hold 2' w/ PB 5" hold x2' w/ grn PB 1.5 ' then held 2/2 to (R) groin pain 5" hold x 2' w/grn PB 5" hold x 2' w/grn PB 5" hold x 2' w/PB 5"hold x 2'  W/ Grn PB 5" hold 2' w/PB   Bug   --   --     --    Bridge  5" hold  2x15 5" hold  2x15 5"  Hold  2x15 2x15 , 3" hold 2x15 2x15  2x15  2x15 2x15 2x10  2x10   SLR  2# 2x10 B 2# 2x10 B 1# 2x15 1# 2x15 1# 2x15 1# 2x10  2x10 B 1x10 B - Next?    SL hip abduction # 2x10 B 2# 2x10 B 1# 2x15 B  1# 2x15 B 1# 2x10 B 2x10 B 2x10 B 1x10 B     Prone:               Alt LE  2x10 B 2x10 B x10 B x10 B x10 B         Alt LE/UE      --         Seated:               TAs        - 5"x12  5"x10    March      2x15   2x15  2x10  --    LAQs      --  - 2x10 Next? --    Lats  BTC 2x10 BTC 2x10 GTB 2x15  GTB 2x15  GTB 2x15 GTB 2x15 GTB 2x15  GTB 2x15    Rows  BTC 2x10 BTC 2x10 GTB 2x15  GTB 2x15  GTB 2x15  GTB 2x15 GTB 2x15 GTB 2x15 GTB 2x15   UBE      --     --    Leg Press  8.0 2x15 DL 8.0 2x15 DL   7.0 2x10  7.0 2x10  Next Next? --    Initials  JA 3/6 DH 1/6 JA 1/6 CK JBF CK CK  DH 1/6 CK DH 1/6 CK           Assessment:     Pt completed session with no reports of increased pain. Some verbal / tactile instructions provided for technique with use of [employer].     Patient Education/Response:     Patient educated to continue HEP.  Verbalized understanding.    Plans and Goals:     Continue PT as POC    Short Term Goals (4 Weeks): 4/13/18  1. Pt will be compliant with HEP to supplement PT in decreasing pain with functional mobility.  2. Pt will perform and hold TA contraction for 10x10" in dynamic " "sitting activities to demonstrate improved core strength  3. Pt will improve lumbar ROM by 10 deg in all planes to improve functional mobility.     Long Term Goals (8 Weeks): 5/13/18  1. Pt will improve FOTO score to </= 50% limited to decrease perceived limitation with maintaining/changing body position  2. Pt will perform and hold TA contraction for 10x10" in dynamic standing activities to demonstrate improved core strength  3. Pt will improve impaired LE MMTs to 5/5 to improve strength for functional tasks.  4. Pt will report lumbar pain </= 5/10 during lifting activities to promote functional QOL.  5. Pt will report lumbar  pain </= 5/10 during lumbar ROM to promote functional mobility.  6. Pt will report</= 4/10 pain in (R) knee when performing ADLs in order to be able to perform ADLs with less difficulty  "

## 2018-05-04 ENCOUNTER — CLINICAL SUPPORT (OUTPATIENT)
Dept: REHABILITATION | Facility: HOSPITAL | Age: 63
End: 2018-05-04
Attending: INTERNAL MEDICINE
Payer: MEDICARE

## 2018-05-04 DIAGNOSIS — M25.561 CHRONIC PAIN OF BOTH KNEES: ICD-10-CM

## 2018-05-04 DIAGNOSIS — M54.50 LUMBAR PAIN: ICD-10-CM

## 2018-05-04 DIAGNOSIS — G89.29 CHRONIC PAIN OF BOTH KNEES: ICD-10-CM

## 2018-05-04 DIAGNOSIS — M25.562 CHRONIC PAIN OF BOTH KNEES: ICD-10-CM

## 2018-05-04 DIAGNOSIS — R29.3 POOR POSTURE: ICD-10-CM

## 2018-05-04 PROCEDURE — 97140 MANUAL THERAPY 1/> REGIONS: CPT | Mod: PN

## 2018-05-04 NOTE — PROGRESS NOTES
"DAILY TREATMENT NOTE    DATE: 5/4/2018    Start Time:  115  Stop Time:  2:00    PROCEDURES:    TIMED  Procedure Min.   MT 25                     UNTIMED  Procedure Min.             Total Timed Minutes:  25  Total Timed Units:  2  Total Untimed Units:  0  Charges Billed/# of units:  2MT      Progress/Current Status    Subjective:     Patient ID: Medhat Nunez is a 63 y.o. male.  Diagnosis:   1. Lumbar pain     2. Chronic pain of both knees     3. Poor posture       Pt reports he woke up today with complaints of B Lower back soreness. He states FDN session helped, and lasted about 3 days. Pt agreeable to PT session.     Objective:     Initiated session on moist heat to full back in supine while completing supine stretches under PTA supervision. Pt then positioned in R sidelying for 25 minutes of manual therapy consisting of: STM to L lumbosacral paraspinals, STM to Left QL, STM to Left IT band up to Pelvic crest, then in prone for cont QL STM/ MFR, pelvic rocking gentle.    Date  5/4/18 5/218 4/27/18 4/23/18 4/20/18 4/17/18 4/13/18 4/10/18 4/6/18 4/3/18 3/27/18  3/23/18   Visit   12/12 11/12 10/12 9/12 8/12 7/12 6/12 5/12 4/12 3/12 2/12   POC update  5/31/18 5/31/48 5/31/18 5/13/18 5/13/18 5/13/18 5/13/18 5/13/18 5/13/18 5/13/18 5/13/18 3/13/18   G code  4/10 3/10 2/10 1/10 9/10 8/10 7/10 6/10 5/10 4/10 3/10 2/10   FOTO 4/10 3/10 2/10 1/10 DONE 9/10 8/10 7/10 6/10 5/5 DONE 4/5 3/5    Visit Amt  60.64 90.96 118.42 88.10 90.96 91.70 57.78 118.42   88.10 118.42   57.78 57.78   Total Amt  1101.94 1041.30 950.34 851.92 763.82 672.86 581.16 523.38 404.96 316.86 198.44 140.66   MHP                MT   NT 10 10  FDN see note FDN see note FDN see note FDN x 10' FDN 15' 10'   bike   5' NT 5'  7' 7' 5'  5' 5'  5'   Stretches                HSS 30"X3 B 30"x3 B 30"x3 B 30"x3 B 3x30" B 3x30" B 30"x3 B 30"X3 B 30"x3 B 30"x3 B 30"x3 B 3x30" B   piriformis 30"x3 B 30"x3 B 30"x3 B 30"x3 B 3x30" B 3x30" B 30"x3 B 30"x3 B 30"x3 B 30"x3 " "B 30"x3 B 2x30" B   QL 30"X3 B 30"x3 B 30"x3 B 30"x3 B 3x30" B 3x30" B  3x30" B  3x30" R MT/sit B 3x30" B   Supine:                TAs  10"x10 10'x10 10"x10 10"x10 10"x10  10"x10 5"x20 5"x20 5"x1`5 5"x15 5"x12 5"x10    LTR  5"xhold x2" w. PB 5" hold x2' w/PB 5" hold x2' w/PB 5' hold w/PB 5' hold w/PB 5" holdx 2 w/ grn PB   5" hold x 2'  W/grn PB  5" hold x 2'  W/grn PB L only 5" hold x 2'  W/grn PB 5" hold x2' w/PB 5" hold x 2' 5" hold 2'   March           held 30"x1    (D) KTC 5" hold x2' w/PB 5" hold x2' w/PB 5" hold x2' w/PB 5" hold w/PB 5" hold 2' w/ PB 5" hold x2' w/ grn PB 1.5 ' then held 2/2 to (R) groin pain 5" hold x 2' w/grn PB 5" hold x 2' w/grn PB 5" hold x 2' w/PB 5"hold x 2'  W/ Grn PB 5" hold 2' w/PB   Bug    --   --     --    Bridge  NT 5" hold  2x15 5" hold  2x15 5"  Hold  2x15 2x15 , 3" hold 2x15 2x15  2x15  2x15 2x15 2x10  2x10   SLR  NT 2# 2x10 B 2# 2x10 B 1# 2x15 1# 2x15 1# 2x15 1# 2x10  2x10 B 1x10 B - Next?    SL hip abduction NT # 2x10 B 2# 2x10 B 1# 2x15 B  1# 2x15 B 1# 2x10 B 2x10 B 2x10 B 1x10 B     Prone:                Alt LE  NT 2x10 B 2x10 B x10 B x10 B x10 B         Alt LE/UE       --         Seated:                TAs         - 5"x12  5"x10    March       2x15   2x15  2x10  --    LAQs       --  - 2x10 Next? --    Lats  NT BTC 2x10 BTC 2x10 GTB 2x15  GTB 2x15  GTB 2x15 GTB 2x15 GTB 2x15  GTB 2x15    Rows  NT BTC 2x10 BTC 2x10 GTB 2x15  GTB 2x15  GTB 2x15  GTB 2x15 GTB 2x15 GTB 2x15 GTB 2x15   UBE       --     --    Leg Press  NT 8.0 2x15 DL 8.0 2x15 DL   7.0 2x10  7.0 2x10  Next Next? --    Initials  JA 4/6 JA 3/6 DH 1/6 JA 1/6 CK JBF CK CK  DH 1/6 CK DH 1/6 CK           Assessment:     Pt reports he feels " Much better" after manual therapy and stretching to lower back. No adverse reactions noted/ reported post session. Pt demonstrated increased Left Quadratus Lumborum mm tension and tender to palpation which decreased significantly following manual therapy today.     Patient " "Education/Response:     Patient educated to continue HEP.  Verbalized understanding.    Plans and Goals:     Continue PT as POC    Short Term Goals (4 Weeks): 4/13/18  1. Pt will be compliant with HEP to supplement PT in decreasing pain with functional mobility.  2. Pt will perform and hold TA contraction for 10x10" in dynamic sitting activities to demonstrate improved core strength  3. Pt will improve lumbar ROM by 10 deg in all planes to improve functional mobility.     Long Term Goals (8 Weeks): 5/13/18  1. Pt will improve FOTO score to </= 50% limited to decrease perceived limitation with maintaining/changing body position  2. Pt will perform and hold TA contraction for 10x10" in dynamic standing activities to demonstrate improved core strength  3. Pt will improve impaired LE MMTs to 5/5 to improve strength for functional tasks.  4. Pt will report lumbar pain </= 5/10 during lifting activities to promote functional QOL.  5. Pt will report lumbar  pain </= 5/10 during lumbar ROM to promote functional mobility.  6. Pt will report</= 4/10 pain in (R) knee when performing ADLs in order to be able to perform ADLs with less difficulty  "

## 2018-05-08 ENCOUNTER — CLINICAL SUPPORT (OUTPATIENT)
Dept: REHABILITATION | Facility: HOSPITAL | Age: 63
End: 2018-05-08
Attending: INTERNAL MEDICINE
Payer: MEDICARE

## 2018-05-08 DIAGNOSIS — G89.29 CHRONIC PAIN OF BOTH KNEES: ICD-10-CM

## 2018-05-08 DIAGNOSIS — R29.3 POOR POSTURE: ICD-10-CM

## 2018-05-08 DIAGNOSIS — M25.562 CHRONIC PAIN OF BOTH KNEES: ICD-10-CM

## 2018-05-08 DIAGNOSIS — M25.561 CHRONIC PAIN OF BOTH KNEES: ICD-10-CM

## 2018-05-08 DIAGNOSIS — M54.50 LUMBAR PAIN: ICD-10-CM

## 2018-05-08 PROCEDURE — 97110 THERAPEUTIC EXERCISES: CPT | Mod: PN

## 2018-05-08 PROCEDURE — 97140 MANUAL THERAPY 1/> REGIONS: CPT | Mod: PN

## 2018-05-08 NOTE — PROGRESS NOTES
PHYSICAL THERAPY FUNCTIONAL DRY NEEDLING TREATMENT NOTE    Date:  5/8/2018      Start Time:  1400  Stop Time:  1415  Total Billable Time: 10 minutes    Progress/Current Status    Subjective:     Patient ID: Medhat Nunez is a 63 y.o. male.  Diagnosis:   1. Lumbar pain     2. Chronic pain of both knees     3. Poor posture       Pain: 9 /10 L  LBP. This is a new pain   Response to last treatment: reports 90% improvement in central B LBP since starting the FDN  Agreeable to FDN    Objective:     Soft Tissue Palpation Assessment to determine the necessity for Functional Dry Needling  (+) tenderness with increased tissue tension, myofascial restrictions L QL.   Patient provided  verbal consent to FDN.   FDN performed to L QL in 2 spots with estim to both spots in prone    Assessment:     Patient demonstrated appropriate response to FDN.   Patient with improved overall tissue mobility with decreased tissue tension and trigger points upon post treatment  palpation of treated muscle groups after Functional Dry Needling.   Fair rhythmical contractions observed with estim to treated muscle groups    Patient Education/Response:   .     Plans and Goals:     Monitor response to FDN. Continue with FDN in POC as appropriate

## 2018-05-08 NOTE — PROGRESS NOTES
"DAILY TREATMENT NOTE    DATE: 5/8/2018    Start Time:  200  Stop Time:  300    PROCEDURES:    TIMED  Procedure Min.   Manual therapy 15   Therex 30     UNTIMED  Procedure Min.   Bike 5         Total Timed Minutes:  45  Total Timed Units:  3  Total Untimed Units:  0  Charges Billed/# of units:  3  MTx1, TEx2 ( See note of CATARINO Song Jr., PT, DPT for additional charge)      Progress/Current Status    Subjective:     Patient ID: Medhat Nunez is a 63 y.o. male.  Diagnosis:   1. Lumbar pain     2. Chronic pain of both knees     3. Poor posture       Pain: 8-9 /10  Patient reports flare up left sided low back.  States relief ~ 24 hours after then return of symptoms.  Asking if FDN is available today.    Objective:     See attached note of Edin Song, PT, DPT for details of FDN.  Patient then performed all therex as per log with 1:1 by PTA x 30 total time.  Pt then positioned in R sidelying for 15 minutes of manual therapy consisting of: STM to L lumbosacral paraspinals, STM to Left QL, STM to Left IT band.    Date  5/8/18 5/4/18 5/218 4/27/18 4/23/18 4/20/18 4/17/18 4/13/18 4/10/18 4/6/18 4/3/18 3/27/18  3/23/18   Visit  14/16  12/12 11/12 10/12 9/12 8/12 7/12 6/12 5/12 4/12 3/12 2/12   POC update  5/13/18 5/31/18 5/31/48 5/31/18 5/13/18 5/13/18 5/13/18 5/13/18 5/13/18 5/13/18 5/13/18 5/13/18 3/13/18   G code  5/10 4/10 3/10 2/10 1/10 9/10 8/10 7/10 6/10 5/10 4/10 3/10 2/10   FOTO w/gcode 4/10 3/10 2/10 1/10 DONE 9/10 8/10 7/10 6/10 5/5 DONE 4/5 3/5    Visit Amt    115.42 60.64 90.96 118.42 88.10 90.96 91.70 57.78 118.42   88.10 118.42   57.78 57.78   Total Amt  1217.50 1101.94 1041.30 950.34 851.92 763.82 672.86 581.16 523.38 404.96 316.86 198.44 140.66   MHP                 MT  FDN + 15   NT 10 10  FDN see note FDN see note FDN see note FDN x 10' FDN 15' 10'   bike    5' NT 5'  7' 7' 5'  5' 5'  5'   Stretches                 HSS 30"x3 B 30"X3 B 30"x3 B 30"x3 B 30"x3 B 3x30" B 3x30" B 30"x3 B 30"X3 B " "30"x3 B 30"x3 B 30"x3 B 3x30" B   piriformis 30"x3 B 30"x3 B 30"x3 B 30"x3 B 30"x3 B 3x30" B 3x30" B 30"x3 B 30"x3 B 30"x3 B 30"x3 B 30"x3 B 2x30" B   QL 30"x3 B 30"X3 B 30"x3 B 30"x3 B 30"x3 B 3x30" B 3x30" B  3x30" B  3x30" R MT/sit B 3x30" B   Supine:                 TAs  10"x10 10"x10 10'x10 10"x10 10"x10 10"x10  10"x10 5"x20 5"x20 5"x1`5 5"x15 5"x12 5"x10    LTR  5" hold x2' w/PB 5"xhold x2" w. PB 5" hold x2' w/PB 5" hold x2' w/PB 5' hold w/PB 5' hold w/PB 5" holdx 2 w/ grn PB   5" hold x 2'  W/grn PB  5" hold x 2'  W/grn PB L only 5" hold x 2'  W/grn PB 5" hold x2' w/PB 5" hold x 2' 5" hold 2'   March            held 30"x1    (D) KTC 5" hold x2' w/PB 5" hold x2' w/PB 5" hold x2' w/PB 5" hold x2' w/PB 5" hold w/PB 5" hold 2' w/ PB 5" hold x2' w/ grn PB 1.5 ' then held 2/2 to (R) groin pain 5" hold x 2' w/grn PB 5" hold x 2' w/grn PB 5" hold x 2' w/PB 5"hold x 2'  W/ Grn PB 5" hold 2' w/PB   Bug     --   --     --    Bridge  5" hold  2x15 NT 5" hold  2x15 5" hold  2x15 5"  Hold  2x15 2x15 , 3" hold 2x15 2x15  2x15  2x15 2x15 2x10  2x10   SLR  2# 2x12 B NT 2# 2x10 B 2# 2x10 B 1# 2x15 1# 2x15 1# 2x15 1# 2x10  2x10 B 1x10 B - Next?    SL hip abduction 2# 2x12 B NT # 2x10 B 2# 2x10 B 1# 2x15 B  1# 2x15 B 1# 2x10 B 2x10 B 2x10 B 1x10 B     Prone:                 Alt LE  NT NT 2x10 B 2x10 B x10 B x10 B x10 B         Alt LE/UE        --         Seated:                 TAs          - 5"x12  5"x10    March        2x15   2x15  2x10  --    LAQs        --  - 2x10 Next? --    Lats  BTC 2x15 NT BTC 2x10 BTC 2x10 GTB 2x15  GTB 2x15  GTB 2x15 GTB 2x15 GTB 2x15  GTB 2x15    Rows  BTC 2x15 NT BTC 2x10 BTC 2x10 GTB 2x15  GTB 2x15  GTB 2x15  GTB 2x15 GTB 2x15 GTB 2x15 GTB 2x15   UBE        --     --    Leg Press  8.5  DL  2x15  NT 8.0 2x15 DL 8.0 2x15 DL   7.0 2x10  7.0 2x10  Next Next? --    Initials  DH 5/6 JA 4/6 JA 3/6 DH 1/6 JA 1/6 CK JBF CK CK  DH 1/6 CK DH 1/6 CK       Assessment:     Patient able to resume most therex today " "after FDN, completed same with reports of "I feel much better now - rates 4/10" after treatment.  See attached note for assessment of FDN.    Patient Education/Response:     Patient educated to continue HEP.  Verbalized understanding.    Plans and Goals:     Continue PT as POC    Short Term Goals (4 Weeks): 4/13/18  1. Pt will be compliant with HEP to supplement PT in decreasing pain with functional mobility.  2. Pt will perform and hold TA contraction for 10x10" in dynamic sitting activities to demonstrate improved core strength  3. Pt will improve lumbar ROM by 10 deg in all planes to improve functional mobility.     Long Term Goals (8 Weeks): 5/13/18  1. Pt will improve FOTO score to </= 50% limited to decrease perceived limitation with maintaining/changing body position  2. Pt will perform and hold TA contraction for 10x10" in dynamic standing activities to demonstrate improved core strength  3. Pt will improve impaired LE MMTs to 5/5 to improve strength for functional tasks.  4. Pt will report lumbar pain </= 5/10 during lifting activities to promote functional QOL.  5. Pt will report lumbar  pain </= 5/10 during lumbar ROM to promote functional mobility.  6. Pt will report</= 4/10 pain in (R) knee when performing ADLs in order to be able to perform ADLs with less difficulty  "

## 2018-05-11 ENCOUNTER — CLINICAL SUPPORT (OUTPATIENT)
Dept: REHABILITATION | Facility: HOSPITAL | Age: 63
End: 2018-05-11
Attending: INTERNAL MEDICINE
Payer: MEDICARE

## 2018-05-11 DIAGNOSIS — M54.50 LUMBAR PAIN: ICD-10-CM

## 2018-05-11 DIAGNOSIS — M25.562 CHRONIC PAIN OF BOTH KNEES: ICD-10-CM

## 2018-05-11 DIAGNOSIS — M25.561 CHRONIC PAIN OF BOTH KNEES: ICD-10-CM

## 2018-05-11 DIAGNOSIS — G89.29 CHRONIC PAIN OF BOTH KNEES: ICD-10-CM

## 2018-05-11 DIAGNOSIS — R29.3 POOR POSTURE: ICD-10-CM

## 2018-05-11 PROCEDURE — 97140 MANUAL THERAPY 1/> REGIONS: CPT | Mod: PN

## 2018-05-11 PROCEDURE — G8979 MOBILITY GOAL STATUS: HCPCS | Mod: CJ,PN

## 2018-05-11 PROCEDURE — G8978 MOBILITY CURRENT STATUS: HCPCS | Mod: CK,PN

## 2018-05-11 PROCEDURE — 97110 THERAPEUTIC EXERCISES: CPT | Mod: PN

## 2018-05-11 NOTE — PLAN OF CARE
TIME RECORD    Date: 05/11/2018    PHYSICAL THERAPY UPDATED PLAN OF TREATMENT    Patient name: Medhat Nunez  Onset Date:  chronic  SOC Date:  3/13/18  Primary Diagnosis:    1. Lumbar pain     2. Chronic pain of both knees     3. Poor posture       Treatment Diagnosis:    1. Lumbar pain      2. Chronic pain of both knees      3. Poor posture         Certification Period:  5/11/18 to 6/22/18  Precautions:  standard  Visits from SOC:  15  Functional Level Prior to SOC:  independent    Updated Assessment:  Since initial evaluation on 3/13/18, pt has attended 14 PT follow up sessions. Pt has made steady progress with his PT treatments as evident by subjective and objective improvements. Pt reports 90% improvement in mid and (R) sided lumbar pain, 60% improvement in (L) sided lumbar pain, and significant decrease in (B) knee pain since SOC. Pt also demo improved lumbar AROM with decreased c/o pain, improved (B) LE strength, and improved FOTO Score compared to measurements taken on initial evaluation. Despite these improvements, pt still presents with c/o lumbar pain, (L) > (R), decreased/painful lumbar AROM, and decreased functional mobility. Therefore, pt will continue to benefit from skilled PT in order to return pt to his highest level of function with decreased pain and limitation. See objective measurements below.     Range of Motion/Strength:      Lumbar AROM: Pain/Dysfunction with Movement:   Flexion 60  degrees     Extension 10 degrees C/o mild pain in (L) lumbar region   Right side bending 20 degrees C/o  increased pain in (L) lumbar/ QL region   Left side bending 25 degrees C/o mild increased pulling in (R) lumbar/QL region   Right rotation 40  degrees    Left rotation 30 degrees C/o mild increased pain in (L) lumbar region       Hip Right   Left   Pain/Dysfunction with Movement     AROM MMT AROM MMT     Flexion WFL 5/5 WFL 5/5     Extension NT NT NT NT Able to perform good bridge against gravity   Abduction  "WFL 5/5 WFL 4+/5! pain       Knee   Right     Left   Pain/Dysfunction with Movement     AROM PROM MMT AROM PROM MMT     Flexion 132 NT 5/5 140 NT 5/5     Extension 2 NT 5/5 2 NT 5/5        Ankle Right   Left   Pain/Dysfunction with Movement     AROM MMT AROM MMT     Plantarflexion WFL 5/5 WFL 5/5     Dorsiflexion WFL 5/5 WFL 5/5       FOTO: 44% limited    Previous Short Term Goals Status:     Short Term Goals (4 Weeks): 4/13/18  1. Pt will be compliant with HEP to supplement PT in decreasing pain with functional mobility. - MET  2. Pt will perform and hold TA contraction for 10x10" in dynamic sitting activities to demonstrate improved core strength - MET  3. Pt will improve lumbar ROM by 10 deg in all planes to improve functional mobility. - PARTIALLY MET      Long Term Goals (8 Weeks): 5/13/18  1. Pt will improve FOTO score to </= 50% limited to decrease perceived limitation with maintaining/changing body position - MET  2. Pt will perform and hold TA contraction for 10x10" in dynamic standing activities to demonstrate improved core strength  - MET  3. Pt will improve impaired LE MMTs to 5/5 to improve strength for functional tasks. - PARTIALLY MET  4. Pt will report lumbar pain </= 5/10 during lifting activities to promote functional QOL. - PARTIALLY MET  5. Pt will report lumbar  pain </= 5/10 during lumbar ROM to promote functional mobility. - PARTIALLY MET  6. Pt will report</= 4/10 pain in (R) knee when performing ADLs in order to be able to perform ADLs with less difficulty - MET    New Short Term Goals Status:   Continue with STG 3 and LTG 3,4,5; New Long Term Goal 1) Pt will improve FOTO score to </= 35% limited in order to demo improved functional mobility   Long Term Goal Status:   continue per initial plan of care.  Reasons for Recertification of Therapy:   Required updated POC    Certification Period: 5/11/18 to 6/22/18  Recommended Treatment Plan: 1 times per week for 6 weeks: Gait Training, Group " Therapy, Locomotor Training, Manual Therapy, Moist Heat/ Ice, Neuromuscular Re-ed, Patient Education, Self Care, Therapeutic Activites, Therapeutic Exercise and Other FDN, ASTYM, and modalities prn  Other Recommendations: n/a        Therapist's Name: Cat aDniel, PT   Date: 05/11/2018    I CERTIFY THE NEED FOR THESE SERVICES FURNISHED UNDER THIS PLAN OF TREATMENT AND WHILE UNDER MY CARE    Physician's comments: ________________________________________________________________________________________________________________________________________________      Physician's Name: ___________________________________

## 2018-05-11 NOTE — PROGRESS NOTES
"DAILY TREATMENT NOTE    DATE: 5/11/2018    Start Time:  11:02 am   Stop Time:  11:53 am     PROCEDURES:    TIMED  Procedure Min.   TE 51'                     UNTIMED  Procedure Min.             Total Timed Minutes:  51'  Total Timed Units:  3  Total Untimed Units:  0  Charges Billed/# of units:  3 TE      Progress/Current Status    Subjective:     Patient ID: Medhat Nunez is a 63 y.o. male.  Diagnosis:   1. Lumbar pain     2. Chronic pain of both knees     3. Poor posture       Pain:   Pt stated that his pain is now only in (L) lumbar/QL region. Pt stated that (R) side and mid lumbar region are 90% better since SOC. Pt stated that (L) side is approximately 60% better.     Objective:     Objective measurements were taken and pt participated in therex per log below x 51'. FDN performed by Edin Song, PT ( see note)     Date  5/11/18 5/8/18 5/4/18 5/218 4/27/18 4/23/18 4/20/18 4/17/18 4/13/18 4/10/18 4/6/18 4/3/18 3/27/18  3/23/18   Visit  15/16 14/16  12/12 11/12 10/12 9/12 8/12 7/12 6/12 5/12 4/12 3/12 2/12   POC update  6/22/18 5/13/18 5/31/18 5/31/48 5/31/18 5/13/18 5/13/18 5/13/18 5/13/18 5/13/18 5/13/18 5/13/18 5/13/18 3/13/18   G code  6/10  5/10 4/10 3/10 2/10 1/10 9/10 8/10 7/10 6/10 5/10 4/10 3/10 2/10   FOTO  w/gcode 4/10 3/10 2/10 1/10 DONE 9/10 8/10 7/10 6/10 5/5 DONE 4/5 3/5    Visit Amt  118.42   115.42 60.64 90.96 118.42 88.10 90.96 91.70 57.78 118.42   88.10 118.42   57.78 57.78   Total Amt  1335.92 1217.50 1101.94 1041.30 950.34 851.92 763.82 672.86 581.16 523.38 404.96 316.86 198.44 140.66   MHP                  MT   FDN + 15   NT 10 10  FDN see note FDN see note FDN see note FDN x 10' FDN 15' 10'   bike     5' NT 5'  7' 7' 5'  5' 5'  5'   Stretches                  HSS 30"x3 B  30"x3 B 30"X3 B 30"x3 B 30"x3 B 30"x3 B 3x30" B 3x30" B 30"x3 B 30"X3 B 30"x3 B 30"x3 B 30"x3 B 3x30" B   piriformis 30"x3 B 30"x3 B 30"x3 B 30"x3 B 30"x3 B 30"x3 B 3x30" B 3x30" B 30"x3 B 30"x3 B 30"x3 B 30"x3 B " "30"x3 B 2x30" B   QL 30"x3 R  30"x3 B 30"X3 B 30"x3 B 30"x3 B 30"x3 B 3x30" B 3x30" B  3x30" B  3x30" R MT/sit B 3x30" B   Supine:                  TAs   10"x10 10"x10 10'x10 10"x10 10"x10 10"x10  10"x10 5"x20 5"x20 5"x1`5 5"x15 5"x12 5"x10    LTR  5" hold 2" w/PB 5" hold x2' w/PB 5"xhold x2" w. PB 5" hold x2' w/PB 5" hold x2' w/PB 5' hold w/PB 5' hold w/PB 5" holdx 2 w/ grn PB   5" hold x 2'  W/grn PB  5" hold x 2'  W/grn PB L only 5" hold x 2'  W/grn PB 5" hold x2' w/PB 5" hold x 2' 5" hold 2'   March             held 30"x1    (D) KTC 5" hold 2' w/PB 5" hold x2' w/PB 5" hold x2' w/PB 5" hold x2' w/PB 5" hold x2' w/PB 5" hold w/PB 5" hold 2' w/ PB 5" hold x2' w/ grn PB 1.5 ' then held 2/2 to (R) groin pain 5" hold x 2' w/grn PB 5" hold x 2' w/grn PB 5" hold x 2' w/PB 5"hold x 2'  W/ Grn PB 5" hold 2' w/PB   Bug      --   --     --    Bridge  2x15 3" hold 5" hold  2x15 NT 5" hold  2x15 5" hold  2x15 5"  Hold  2x15 2x15 , 3" hold 2x15 2x15  2x15  2x15 2x15 2x10  2x10   SLR  2# 2x15 2# 2x12 B NT 2# 2x10 B 2# 2x10 B 1# 2x15 1# 2x15 1# 2x15 1# 2x10  2x10 B 1x10 B - Next?    SL hip abduction 2# 2x15 2# 2x12 B NT # 2x10 B 2# 2x10 B 1# 2x15 B  1# 2x15 B 1# 2x10 B 2x10 B 2x10 B 1x10 B     Prone:                  Alt LE   NT NT 2x10 B 2x10 B x10 B x10 B x10 B         Alt LE/UE         --         Seated:                  TAs           - 5"x12  5"x10    March         2x15   2x15  2x10  --    LAQs         --  - 2x10 Next? --    Lats  BTC 2x15 BTC 2x15 NT BTC 2x10 BTC 2x10 GTB 2x15  GTB 2x15  GTB 2x15 GTB 2x15 GTB 2x15  GTB 2x15    Rows  BTC 2x15 BTC 2x15 NT BTC 2x10 BTC 2x10 GTB 2x15  GTB 2x15  GTB 2x15  GTB 2x15 GTB 2x15 GTB 2x15 GTB 2x15   UBE         --     --    Leg Press  9.0 2x10 DL  8.5  DL  2x15  NT 8.0 2x15 DL 8.0 2x15 DL   7.0 2x10  7.0 2x10  Next Next? --    steamboats next                Initials  CK  DH 5/6 JA 4/6 JA 3/6 DH 1/6 JA 1/6 CK JBF CK CK  DH 1/6 CK DH 1/6 CK         Assessment:     See treatment section " for updated POC    Patient Education/Response:     See treatment section for updated POC    Plans and Goals:     See treatment section for updated POC

## 2018-05-14 NOTE — PROGRESS NOTES
PHYSICAL THERAPY FUNCTIONAL DRY NEEDLING TREATMENT NOTE    Date:  5/11/2018      Start Time:  1155  Stop Time:  1210  Total Billable Time: 10 minutes    Progress/Current Status    Subjective:     Patient ID: Medhat Nunez is a 63 y.o. male.  Diagnosis:   1. Lumbar pain     2. Chronic pain of both knees     3. Poor posture       Response to last treatment: reports 60% improvement in L  LBP   Agreeable to FDN    Objective:     Soft Tissue Palpation Assessment to determine the necessity for Functional Dry Needling  (+) tenderness with increased tissue tension, myofascial restrictions L QL.   Patient provided  verbal consent to FDN.   FDN performed to L QL in 2 spots with estim to both spots in prone    Assessment:     Patient demonstrated appropriate response to FDN.   Patient with improved overall tissue mobility with decreased tissue tension and trigger points upon post treatment  palpation of treated muscle groups after Functional Dry Needling.   Fair rhythmical contractions observed with estim to treated muscle groups    Patient Education/Response:   .     Plans and Goals:     Monitor response to FDN. Continue with FDN in POC as appropriate

## 2018-05-17 ENCOUNTER — CLINICAL SUPPORT (OUTPATIENT)
Dept: REHABILITATION | Facility: HOSPITAL | Age: 63
End: 2018-05-17
Attending: INTERNAL MEDICINE
Payer: MEDICARE

## 2018-05-17 DIAGNOSIS — M54.50 LUMBAR PAIN: ICD-10-CM

## 2018-05-17 DIAGNOSIS — M25.562 CHRONIC PAIN OF BOTH KNEES: ICD-10-CM

## 2018-05-17 DIAGNOSIS — R29.3 POOR POSTURE: ICD-10-CM

## 2018-05-17 DIAGNOSIS — G89.29 CHRONIC PAIN OF BOTH KNEES: ICD-10-CM

## 2018-05-17 DIAGNOSIS — M25.561 CHRONIC PAIN OF BOTH KNEES: ICD-10-CM

## 2018-05-17 PROCEDURE — 97140 MANUAL THERAPY 1/> REGIONS: CPT | Mod: PN

## 2018-05-17 PROCEDURE — 97110 THERAPEUTIC EXERCISES: CPT | Mod: PN

## 2018-05-17 NOTE — PROGRESS NOTES
PHYSICAL THERAPY FUNCTIONAL DRY NEEDLING TREATMENT NOTE    Date:  5/17/2018      Start Time:  1555  Stop Time:  1610  Total Billable Time: 10 minutes    Progress/Current Status    Subjective:     Patient ID: Medhat Nunez is a 63 y.o. male.  Diagnosis:   1. Lumbar pain     2. Chronic pain of both knees     3. Poor posture       Response to last treatment: reports about 3 days of relief after last session, but he woke up this morning and had increased B LBP.   Agreeable to FDN    Objective:     Soft Tissue Palpation Assessment to determine the necessity for Functional Dry Needling  (+) tenderness with increased tissue tension, myofascial restrictions B lower lumbar multifidi   Patient provided  verbal consent to FDN.   FDN performed to B L4 and L5 multifidus with estim to both spots in prone    Assessment:     Patient demonstrated appropriate response to FDN.   Patient with improved overall tissue mobility with decreased tissue tension and trigger points upon post treatment  palpation of treated muscle groups after Functional Dry Needling.  Good  rhythmical contractions observed with estim to treated muscle groups  Patient is approaching max potential for benefiting from FDN    Patient Education/Response:     Educated on if longer extended relief does not begin to occur, FDN will be stopped and patient may have to return to the MD. Patient verbalized understanding    Plans and Goals:     Monitor response to FDN. Continue with FDN in POC as appropriate

## 2018-05-17 NOTE — PROGRESS NOTES
"DAILY TREATMENT NOTE    DATE: 5/17/2018    Start Time:  3;00 pm   Stop Time:   3:45 pm     PROCEDURES:    TIMED  Procedure Min.   TE 30'   TE sup 25                 UNTIMED  Procedure Min.             Total Timed Minutes:  45.'  Total Timed Units:  3  Total Untimed Units:  0  Charges Billed/# of units:  3 TE      Progress/Current Status    Subjective:     Patient ID: Medhat Nunez is a 63 y.o. male.  Diagnosis:   1. Lumbar pain     2. Chronic pain of both knees     3. Poor posture       Pain:   Pt stated that his pain is B today.     Objective:      TE per log below x 30' 1:1 with PT A and 25 minutes supervised f/b  FDN performed by Edin Song, PT ( see note)     Date  5/17/18 5/11/18 5/8/18 5/4/18 5/218 4/27/18 4/23/18 4/20/18 4/17/18 4/13/18 4/10/18 4/6/18 4/3/18 3/27/18  3/23/18   Visit  16/16 15/16 14/16  12/12 11/12 10/12 9/12 8/12 7/12 6/12 5/12 4/12 3/12 2/12   POC update  6/22/18 6/22/18 5/13/18 5/31/18 5/31/48 5/31/18 5/13/18 5/13/18 5/13/18 5/13/18 5/13/18 5/13/18 5/13/18 5/13/18 3/13/18   G code  7/10 6/10  5/10 4/10 3/10 2/10 1/10 9/10 8/10 7/10 6/10 5/10 4/10 3/10 2/10   FOTO   w/gcode 4/10 3/10 2/10 1/10 DONE 9/10 8/10 7/10 6/10 5/5 DONE 4/5 3/5    Visit Amt  90.96 118.42   115.42 60.64 90.96 118.42 88.10 90.96 91.70 57.78 118.42   88.10 118.42   57.78 57.78   Total Amt  1426.88 1335.92 1217.50 1101.94 1041.30 950.34 851.92 763.82 672.86 581.16 523.38 404.96 316.86 198.44 140.66   MHP                   MT  FDN+15  FDN + 15   NT 10 10  FDN see note FDN see note FDN see note FDN x 10' FDN 15' 10'   bike      5' NT 5'  7' 7' 5'  5' 5'  5'   Stretches                   HSS 3x30" B 30"x3 B  30"x3 B 30"X3 B 30"x3 B 30"x3 B 30"x3 B 3x30" B 3x30" B 30"x3 B 30"X3 B 30"x3 B 30"x3 B 30"x3 B 3x30" B   piriformis 3x30" B 30"x3 B 30"x3 B 30"x3 B 30"x3 B 30"x3 B 30"x3 B 3x30" B 3x30" B 30"x3 B 30"x3 B 30"x3 B 30"x3 B 30"x3 B 2x30" B   IT Band stretch  3x30" Manually                 Kimo quad stretch " "2x30" w/strap                 QL 3x30" B 30"x3 R  30"x3 B 30"X3 B 30"x3 B 30"x3 B 30"x3 B 3x30" B 3x30" B  3x30" B  3x30" R MT/sit B 3x30" B   Supine:                   TAs  10"x10  10"x10 10"x10 10'x10 10"x10 10"x10 10"x10  10"x10 5"x20 5"x20 5"x1`5 5"x15 5"x12 5"x10    LTR  5' hold 2' w/PB 5" hold 2" w/PB 5" hold x2' w/PB 5"xhold x2" w. PB 5" hold x2' w/PB 5" hold x2' w/PB 5' hold w/PB 5' hold w/PB 5" holdx 2 w/ grn PB   5" hold x 2'  W/grn PB  5" hold x 2'  W/grn PB L only 5" hold x 2'  W/grn PB 5" hold x2' w/PB 5" hold x 2' 5" hold 2'   March              held 30"x1    (D) KTC 5' hold 2'  PB 5" hold 2' w/PB 5" hold x2' w/PB 5" hold x2' w/PB 5" hold x2' w/PB 5" hold x2' w/PB 5" hold w/PB 5" hold 2' w/ PB 5" hold x2' w/ grn PB 1.5 ' then held 2/2 to (R) groin pain 5" hold x 2' w/grn PB 5" hold x 2' w/grn PB 5" hold x 2' w/PB 5"hold x 2'  W/ Grn PB 5" hold 2' w/PB   Bug       --   --     --    Bridge  2x15 3" hold 2x15 3" hold 5" hold  2x15 NT 5" hold  2x15 5" hold  2x15 5"  Hold  2x15 2x15 , 3" hold 2x15 2x15  2x15  2x15 2x15 2x10  2x10   SLR  2# 2x15 B 2# 2x15 2# 2x12 B NT 2# 2x10 B 2# 2x10 B 1# 2x15 1# 2x15 1# 2x15 1# 2x10  2x10 B 1x10 B - Next?    SL hip abduction 2# 2x15 2# 2x15 2# 2x12 B NT # 2x10 B 2# 2x10 B 1# 2x15 B  1# 2x15 B 1# 2x10 B 2x10 B 2x10 B 1x10 B     Prone:                   Alt LE    NT NT 2x10 B 2x10 B x10 B x10 B x10 B         Alt LE/UE          --         Seated:                   TAs            - 5"x12  5"x10    March          2x15   2x15  2x10  --    LAQs          --  - 2x10 Next? --    Lats  BTB 2x15 BTC 2x15 BTC 2x15 NT BTC 2x10 BTC 2x10 GTB 2x15  GTB 2x15  GTB 2x15 GTB 2x15 GTB 2x15  GTB 2x15    Rows  BTB 2x15 BTC 2x15 BTC 2x15 NT BTC 2x10 BTC 2x10 GTB 2x15  GTB 2x15  GTB 2x15  GTB 2x15 GTB 2x15 GTB 2x15 GTB 2x15   UBE          --     --    Leg Press  oot 9.0 2x10 DL  8.5  DL  2x15  NT 8.0 2x15 DL 8.0 2x15 DL   7.0 2x10  7.0 2x10  Next Next? --    steamboats  next              "   Initials  MB 1/6 CK  DH 5/6 JA 4/6 JA 3/6 DH 1/6 JA 1/6 CK JBF CK CK  DH 1/6 CK DH 1/6 CK         Assessment:     See treatment section for updated POC. Seemed to benefit from IT Band and Quad stretches with noted decreased pain following.     Patient Education/Response:     See treatment section for updated POC    Plans and Goals:     See treatment section for updated POC

## 2018-05-21 ENCOUNTER — CLINICAL SUPPORT (OUTPATIENT)
Dept: REHABILITATION | Facility: HOSPITAL | Age: 63
End: 2018-05-21
Attending: INTERNAL MEDICINE
Payer: MEDICARE

## 2018-05-21 DIAGNOSIS — M25.562 CHRONIC PAIN OF BOTH KNEES: ICD-10-CM

## 2018-05-21 DIAGNOSIS — G89.29 CHRONIC PAIN OF BOTH KNEES: ICD-10-CM

## 2018-05-21 DIAGNOSIS — M25.561 CHRONIC PAIN OF BOTH KNEES: ICD-10-CM

## 2018-05-21 DIAGNOSIS — M54.50 LUMBAR PAIN: ICD-10-CM

## 2018-05-21 DIAGNOSIS — R29.3 POOR POSTURE: ICD-10-CM

## 2018-05-21 PROCEDURE — 97140 MANUAL THERAPY 1/> REGIONS: CPT | Mod: PN

## 2018-05-21 PROCEDURE — 97110 THERAPEUTIC EXERCISES: CPT | Mod: PN

## 2018-05-21 NOTE — PROGRESS NOTES
"DAILY TREATMENT NOTE    DATE: 5/21/2018    Start Time:  9:05am  Stop Time:  9:50am    PROCEDURES:    TIMED  Procedure Min.   TE 45                     UNTIMED  Procedure Min.             Total Timed Minutes:  45  Total Timed Units:  3  Total Untimed Units:  0  Charges Billed/# of units:  3 (3TE)      Progress/Current Status    Subjective:     Patient ID: Medhat Nunez is a 63 y.o. male.  Diagnosis:   1. Lumbar pain     2. Chronic pain of both knees     3. Poor posture       Pain: 5 /10  Pt stated that his back started feeling better a couple of days after last FDN session. Pt stated that his back is still feeling better since last FDN session. Pt continues to report overall decreased pain in B lumbar region.     Objective:      TE per log below x 45' 1:1 with SPT with supervision of Cat Daniel PT, f/b FDN by Lima Field (see note).     Date  5/21/18 5/17/18 5/11/18 5/8/18 5/4/18 5/218 4/27/18 4/23/18 4/20/18 4/17/18 4/13/18 4/10/18 4/6/18 4/3/18 3/27/18  3/23/18   Visit  16/16 16/16 15/16 14/16   12/12 11/12 10/12 9/12 8/12 7/12 6/12 5/12 4/12 3/12 2/12   POC update  6/22/18 6/22/18 6/22/18 5/13/18 5/31/18 5/31/48 5/31/18 5/13/18 5/13/18 5/13/18 5/13/18 5/13/18 5/13/18 5/13/18 5/13/18 3/13/18   G code  8/10 7/10 6/10  5/10 4/10 3/10 2/10 1/10 9/10 8/10 7/10 6/10 5/10 4/10 3/10 2/10   FOTO      w/gcode 4/10 3/10 2/10 1/10 DONE 9/10 8/10 7/10 6/10 5/5 DONE 4/5 3/5     Visit Amt  90.96  1517.84 90.96 118.42   115.42 60.64 90.96 118.42 88.10 90.96 91.70 57.78 118.42   88.10 118.42   57.78 57.78   Total Amt   1426.88 1335.92 1217.50 1101.94 1041.30 950.34 851.92 763.82 672.86 581.16 523.38 404.96 316.86 198.44 140.66   [facility]                                   MT  FDN JF FDN+15   FDN + 15    NT 10 10   FDN see note FDN see note FDN see note FDN x 10' FDN 15' 10'   bike            5' NT 5'  7' 7' 5'  5' 5'   5'   Stretches                                   HSS 3x30" B 3x30" B 30"x3 B  30"x3 B 30"X3 B 30"x3 B " "30"x3 B 30"x3 B 3x30" B 3x30" B 30"x3 B 30"X3 B 30"x3 B 30"x3 B 30"x3 B 3x30" B   piriformis 3x30"B 3x30" B 30"x3 B 30"x3 B 30"x3 B 30"x3 B 30"x3 B 30"x3 B 3x30" B 3x30" B 30"x3 B 30"x3 B 30"x3 B 30"x3 B 30"x3 B 2x30" B   IT Band stretch  3x30"B 3x30" Manually                               Kimo quad stretch 2x30" w/strap 2x30" w/strap                               QL 3x30" 3x30" B 30"x3 R  30"x3 B 30"X3 B 30"x3 B 30"x3 B 30"x3 B 3x30" B 3x30" B   3x30" B   3x30" R MT/sit B 3x30" B   Supine:                                   TAs  10" x 10 10"x10   10"x10 10"x10 10'x10 10"x10 10"x10 10"x10  10"x10 5"x20 5"x20 5"x1`5 5"x15 5"x12 5"x10    LTR  5' hold 2' PB 5' hold 2' w/PB 5" hold 2" w/PB 5" hold x2' w/PB 5"xhold x2" w. PB 5" hold x2' w/PB 5" hold x2' w/PB 5' hold w/PB 5' hold w/PB 5" holdx 2 w/ grn PB    5" hold x 2'  W/grn PB  5" hold x 2'  W/grn PB L only 5" hold x 2'  W/grn PB 5" hold x2' w/PB 5" hold x 2' 5" hold 2'   March                           held 30"x1     (D) KTC 5' hold 2" PB 5' hold 2'  PB 5" hold 2' w/PB 5" hold x2' w/PB 5" hold x2' w/PB 5" hold x2' w/PB 5" hold x2' w/PB 5" hold w/PB 5" hold 2' w/ PB 5" hold x2' w/ grn PB 1.5 ' then held 2/2 to (R) groin pain 5" hold x 2' w/grn PB 5" hold x 2' w/grn PB 5" hold x 2' w/PB 5"hold x 2'  W/ Grn PB 5" hold 2' w/PB   Bug             --     --         --     Bridge  2x15 3" hold 2x15 3" hold 2x15 3" hold 5" hold  2x15 NT 5" hold  2x15 5" hold  2x15 5"  Hold  2x15 2x15 , 3" hold 2x15 2x15  2x15  2x15 2x15 2x10  2x10   SLR  2# 2x15 B 2# 2x15 B 2# 2x15 2# 2x12 B NT 2# 2x10 B 2# 2x10 B 1# 2x15 1# 2x15 1# 2x15 1# 2x10  2x10 B 1x10 B - Next?     SL hip abduction 2# 2x15 B 2# 2x15 2# 2x15 2# 2x12 B NT # 2x10 B 2# 2x10 B 1# 2x15 B   1# 2x15 B 1# 2x10 B 2x10 B 2x10 B 1x10 B       Prone:                                   Alt LE       NT NT 2x10 B 2x10 B x10 B x10 B x10 B               Alt LE/UE                   --               Seated:                                 " "  TAs                       - 5"x12   5"x10     March                   2x15    2x15  2x10   --     LAQs                   --   - 2x10 Next? --     Lats  OOT BTB 2x15 BTC 2x15 BTC 2x15 NT BTC 2x10 BTC 2x10 GTB 2x15   GTB 2x15   GTB 2x15 GTB 2x15 GTB 2x15  GTB 2x15     Rows  OOT BTB 2x15 BTC 2x15 BTC 2x15 NT BTC 2x10 BTC 2x10 GTB 2x15   GTB 2x15   GTB 2x15  GTB 2x15 GTB 2x15 GTB 2x15 GTB 2x15   UBE                   --         --     Leg Press  OOT oot 9.0 2x10 DL  8.5  DL  2x15  NT 8.0 2x15 DL 8.0 2x15 DL     7.0 2x10   7.0 2x10  Next Next? --     steamboats    next                             Initials  AB/CK MB 1/6 CK  DH 5/6 JA 4/6 JA 3/6 DH 1/6 JA 1/6 CK JBF CK CK  DH 1/6 CK DH 1/6 CK                Assessment:     Pt was able to complete all TE without complaints of increased pain. Pt reports that all TE continues to feel challenging.     Patient Education/Response:     Pt educated to continue with HEP, pt verbalized understanding.     Plans and Goals:   Previous Short Term Goals Status:       Continue POC as pt tolerates.   Short Term Goals (4 Weeks): 4/13/18  1. Pt will be compliant with HEP to supplement PT in decreasing pain with functional mobility. - MET  2. Pt will perform and hold TA contraction for 10x10" in dynamic sitting activities to demonstrate improved core strength - MET  3. Pt will improve lumbar ROM by 10 deg in all planes to improve functional mobility. - PARTIALLY MET      Long Term Goals (8 Weeks): 5/13/18  1. Pt will improve FOTO score to </= 50% limited to decrease perceived limitation with maintaining/changing body position - MET  2. Pt will perform and hold TA contraction for 10x10" in dynamic standing activities to demonstrate improved core strength  - MET  3. Pt will improve impaired LE MMTs to 5/5 to improve strength for functional tasks. - PARTIALLY MET  4. Pt will report lumbar pain </= 5/10 during lifting activities to promote functional QOL. - PARTIALLY MET  5. Pt will report " lumbar  pain </= 5/10 during lumbar ROM to promote functional mobility. - PARTIALLY MET  6. Pt will report</= 4/10 pain in (R) knee when performing ADLs in order to be able to perform ADLs with less difficulty - MET    New Short Term Goals Status:   Continue with STG 3 and LTG 3,4,5; New Long Term Goal 1) Pt will improve FOTO score to </= 35% limited in order to demo improved functional mobility     Rayo Erickson, SPT     I certify that I was present in the room directing the student in service delivery and guiding them using my skilled judgment. As the co-signing therapist I have reviewed the students documentation and am responsible for the treatment, assessment, and plan. Cat Daniel, PT 5/21/2018

## 2018-05-21 NOTE — PROGRESS NOTES
TIME RECORD    Date:  05/21/2018    Start Time:  9:50 AM  Stop Time:  10:00 AM    PROCEDURES:    TIMED  Procedure Min.   Manual therapy 10               Total Timed Minutes:  10  Total Timed Units:  1  Total Untimed Units:  0  Charges Billed/# of units:  1 (MT=1)      Progress/Current Status    Subjective:     Patient ID: Medhat Nunez is a 63 y.o. male.  Diagnosis:   1. Lumbar pain     2. Chronic pain of both knees     3. Poor posture       Pain: Patient reports that his back is feeling much better since having FDN performed.    Objective:     Patient gave verbal and written consent for FDN. Performed FDN to B L3 and L4 multifidus coupled with estim. Also, performed FDN to B lumbar erector spinae (2 spots).     Assessment:     Patient demonstrated good muscle contraction with FDN today.     Patient Education/Response:     Continue current HEP.    Plans and Goals:     Continue PT POC. Progress FDN and manual therapy as tolerated.

## 2018-05-29 ENCOUNTER — CLINICAL SUPPORT (OUTPATIENT)
Dept: REHABILITATION | Facility: HOSPITAL | Age: 63
End: 2018-05-29
Attending: INTERNAL MEDICINE
Payer: MEDICARE

## 2018-05-29 DIAGNOSIS — G89.29 CHRONIC PAIN OF BOTH KNEES: ICD-10-CM

## 2018-05-29 DIAGNOSIS — M54.50 LUMBAR PAIN: ICD-10-CM

## 2018-05-29 DIAGNOSIS — R29.3 POOR POSTURE: ICD-10-CM

## 2018-05-29 DIAGNOSIS — M25.561 CHRONIC PAIN OF BOTH KNEES: ICD-10-CM

## 2018-05-29 DIAGNOSIS — M25.562 CHRONIC PAIN OF BOTH KNEES: ICD-10-CM

## 2018-05-29 PROCEDURE — 97110 THERAPEUTIC EXERCISES: CPT | Mod: PN

## 2018-05-29 NOTE — PROGRESS NOTES
DAILY TREATMENT NOTE    DATE: 5/29/2018    Start Time:  11:02 am   Stop Time:  12:00 pm     PROCEDURES:    TIMED  Procedure Min.   TE 53'    FDN (see note)  5'                  UNTIMED  Procedure Min.             Total Timed Minutes:  53'  Total Timed Units:  4  Total Untimed Units:  0  Charges Billed/# of units:  4 TE      Progress/Current Status    Subjective:     Patient ID: Medhat Nunez is a 63 y.o. male.  Diagnosis:   1. Lumbar pain     2. Chronic pain of both knees     3. Poor posture       Pain: 4 /10  Pt stated that 3 days last week he had really bad back pain and stayed in bed for 3 days. Pt stated that then pain randomly eased up. Pt stated that maybe his arthritis flared up. Continues to report that overall pain is still better than when he first started PT.     Objective:     TE per log below x 53'. FDN performed by Edin Song, PT (See note)     Date  5/29/18 5/21/18 5/17/18 5/11/18 5/8/18 5/4/18 5/218 4/27/18 4/23/18 4/20/18 4/17/18 4/13/18 4/10/18 4/6/18 4/3/18 3/27/18  3/23/18   Visit  2/6 16/16 16/16 15/16 14/16   12/12 11/12 10/12 9/12 8/12 7/12 6/12 5/12 4/12 3/12 2/12   POC update  6/22/18 6/22/18 6/22/18 6/22/18 5/13/18 5/31/18 5/31/48 5/31/18 5/13/18 5/13/18 5/13/18 5/13/18 5/13/18 5/13/18 5/13/18 5/13/18 3/13/18   G code  9/10 8/10 7/10 6/10  5/10 4/10 3/10 2/10 1/10 9/10 8/10 7/10 6/10 5/10 4/10 3/10 2/10   FOTO       w/gcode 4/10 3/10 2/10 1/10 DONE 9/10 8/10 7/10 6/10 5/5 DONE 4/5 3/5     Visit Amt  121.28  1639.12 90.96  1517.84 90.96 118.42   115.42 60.64 90.96 118.42 88.10 90.96 91.70 57.78 118.42   88.10 118.42   57.78 57.78   Total Amt    1426.88 1335.92 1217.50 1101.94 1041.30 950.34 851.92 763.82 672.86 581.16 523.38 404.96 316.86 198.44 140.66   [facility]                                    MT  FDN RS FDN JF FDN+15   FDN + 15    NT 10 10   FDN see note FDN see note FDN see note FDN x 10' FDN 15' 10'   bike             5' NT 5'  7' 7' 5'  5' 5'   5'   Stretches                   "                  HSS 3x30" B 3x30" B 3x30" B 30"x3 B  30"x3 B 30"X3 B 30"x3 B 30"x3 B 30"x3 B 3x30" B 3x30" B 30"x3 B 30"X3 B 30"x3 B 30"x3 B 30"x3 B 3x30" B   piriformis 3x30" B 3x30"B 3x30" B 30"x3 B 30"x3 B 30"x3 B 30"x3 B 30"x3 B 30"x3 B 3x30" B 3x30" B 30"x3 B 30"x3 B 30"x3 B 30"x3 B 30"x3 B 2x30" B   IT Band stretch  3x30" B 3x30"B 3x30" Manually                               Kimo quad stretch  2x30" w/strap 2x30" w/strap                               QL 3x30"  3x30" 3x30" B 30"x3 R  30"x3 B 30"X3 B 30"x3 B 30"x3 B 30"x3 B 3x30" B 3x30" B   3x30" B   3x30" R MT/sit B 3x30" B   Supine:                                    TAs  10"x10  10" x 10 10"x10   10"x10 10"x10 10'x10 10"x10 10"x10 10"x10  10"x10 5"x20 5"x20 5"x1`5 5"x15 5"x12 5"x10    LTR  5" hold 2'  5' hold 2' PB 5' hold 2' w/PB 5" hold 2" w/PB 5" hold x2' w/PB 5"xhold x2" w. PB 5" hold x2' w/PB 5" hold x2' w/PB 5' hold w/PB 5' hold w/PB 5" holdx 2 w/ grn PB    5" hold x 2'  W/grn PB  5" hold x 2'  W/grn PB L only 5" hold x 2'  W/grn PB 5" hold x2' w/PB 5" hold x 2' 5" hold 2'   March                            held 30"x1     (D) KTC 5" hold 2' PB 5' hold 2" PB 5' hold 2'  PB 5" hold 2' w/PB 5" hold x2' w/PB 5" hold x2' w/PB 5" hold x2' w/PB 5" hold x2' w/PB 5" hold w/PB 5" hold 2' w/ PB 5" hold x2' w/ grn PB 1.5 ' then held 2/2 to (R) groin pain 5" hold x 2' w/grn PB 5" hold x 2' w/grn PB 5" hold x 2' w/PB 5"hold x 2'  W/ Grn PB 5" hold 2' w/PB   Bug              --     --         --     Bridge  2x15, 3" hold 2x15 3" hold 2x15 3" hold 2x15 3" hold 5" hold  2x15 NT 5" hold  2x15 5" hold  2x15 5"  Hold  2x15 2x15 , 3" hold 2x15 2x15  2x15  2x15 2x15 2x10  2x10   SLR  2# 2x15 B 2# 2x15 B 2# 2x15 B 2# 2x15 2# 2x12 B NT 2# 2x10 B 2# 2x10 B 1# 2x15 1# 2x15 1# 2x15 1# 2x10  2x10 B 1x10 B - Next?     SL hip abduction 2# 2x15 B 2# 2x15 B 2# 2x15 2# 2x15 2# 2x12 B NT # 2x10 B 2# 2x10 B 1# 2x15 B   1# 2x15 B 1# 2x10 B 2x10 B 2x10 B 1x10 B       Prone:            " "                        Alt LE        NT NT 2x10 B 2x10 B x10 B x10 B x10 B               Alt LE/UE                    --               Seated:                                    TAs                        - 5"x12   5"x10     March                    2x15    2x15  2x10   --     LAQs                    --   - 2x10 Next? --     Lats  BTB 2x15 OOT BTB 2x15 BTC 2x15 BTC 2x15 NT BTC 2x10 BTC 2x10 GTB 2x15   GTB 2x15   GTB 2x15 GTB 2x15 GTB 2x15  GTB 2x15     Rows  BTB 2x15 OOT BTB 2x15 BTC 2x15 BTC 2x15 NT BTC 2x10 BTC 2x10 GTB 2x15   GTB 2x15   GTB 2x15  GTB 2x15 GTB 2x15 GTB 2x15 GTB 2x15   UBE                    --         --     Leg Press  - OOT oot 9.0 2x10 DL  8.5  DL  2x15  NT 8.0 2x15 DL 8.0 2x15 DL     7.0 2x10   7.0 2x10  Next Next? --     steamboats     next                             Initials  CK AB/CK MB 1/6 CK  DH 5/6 JA 4/6 JA 3/6 DH 1/6 JA 1/6 CK JBF CK CK  DH 1/6 CK DH 1/6 CK        Assessment:     Pt was able to tolerate all therex noted per log above without c/o increased pain. Pt reported decreased lumbar pain at the end of therapy session.     Patient Education/Response:     Continue with HEP     Plans and Goals:     Continue POC as pt tolerates.   Short Term Goals (4 Weeks): 4/13/18  1. Pt will be compliant with HEP to supplement PT in decreasing pain with functional mobility. - MET  2. Pt will perform and hold TA contraction for 10x10" in dynamic sitting activities to demonstrate improved core strength - MET  3. Pt will improve lumbar ROM by 10 deg in all planes to improve functional mobility. - PARTIALLY MET      Long Term Goals (8 Weeks): 5/13/18  1. Pt will improve FOTO score to </= 50% limited to decrease perceived limitation with maintaining/changing body position - MET  2. Pt will perform and hold TA contraction for 10x10" in dynamic standing activities to demonstrate improved core strength  - MET  3. Pt will improve impaired LE MMTs to 5/5 to improve strength for functional tasks. - " PARTIALLY MET  4. Pt will report lumbar pain </= 5/10 during lifting activities to promote functional QOL. - PARTIALLY MET  5. Pt will report lumbar  pain </= 5/10 during lumbar ROM to promote functional mobility. - PARTIALLY MET  6. Pt will report</= 4/10 pain in (R) knee when performing ADLs in order to be able to perform ADLs with less difficulty - MET    New Short Term Goals Status:   Continue with STG 3 and LTG 3,4,5; New Long Term Goal 1) Pt will improve FOTO score to </= 35% limited in order to demo improved functional mobility

## 2018-05-29 NOTE — PROGRESS NOTES
PHYSICAL THERAPY FUNCTIONAL DRY NEEDLING TREATMENT NOTE    Date:  5/29/2018      Start Time:  1140  Stop Time:  1245  Total Billable Time: 5 minutes    Progress/Current Status    Subjective:     Patient ID: Medhat Nunez is a 63 y.o. male.  Diagnosis:   1. Lumbar pain     2. Chronic pain of both knees     3. Poor posture       Agreeable to FDN    Objective:     Soft Tissue Palpation Assessment to determine the necessity for Functional Dry Needling  (+) tenderness with increased tissue tension, myofascial restrictions L QL.   Patient provided  verbal consent to FDN.   FDN performed to L QL in  with estim toin prone    Assessment:     Patient demonstrated appropriate response to FDN.   Patient with improved overall tissue mobility with decreased tissue tension and trigger points upon post treatment  palpation of treated muscle groups after Functional Dry Needling.   Fair rhythmical contractions observed with estim to treated muscle groups    Patient Education/Response:   .     Plans and Goals:     Monitor response to FDN. Continue with FDN in POC as appropriate

## 2018-06-07 ENCOUNTER — CLINICAL SUPPORT (OUTPATIENT)
Dept: REHABILITATION | Facility: HOSPITAL | Age: 63
End: 2018-06-07
Attending: INTERNAL MEDICINE
Payer: MEDICARE

## 2018-06-07 DIAGNOSIS — R29.3 POOR POSTURE: ICD-10-CM

## 2018-06-07 DIAGNOSIS — M25.561 CHRONIC PAIN OF BOTH KNEES: ICD-10-CM

## 2018-06-07 DIAGNOSIS — G89.29 CHRONIC PAIN OF BOTH KNEES: ICD-10-CM

## 2018-06-07 DIAGNOSIS — M25.562 CHRONIC PAIN OF BOTH KNEES: ICD-10-CM

## 2018-06-07 DIAGNOSIS — M54.50 LUMBAR PAIN: ICD-10-CM

## 2018-06-07 PROCEDURE — 97110 THERAPEUTIC EXERCISES: CPT | Mod: PN

## 2018-06-07 PROCEDURE — G8978 MOBILITY CURRENT STATUS: HCPCS | Mod: CK,PN

## 2018-06-07 PROCEDURE — G8979 MOBILITY GOAL STATUS: HCPCS | Mod: CK,PN

## 2018-06-07 NOTE — PROGRESS NOTES
PHYSICAL THERAPY FUNCTIONAL DRY NEEDLING TREATMENT NOTE    Date:  6/7/2018      Start Time:  1525  Stop Time:  1530  Total Billable Time: 5 minutes    Progress/Current Status    Subjective:     Patient ID: Medhat Nunez is a 63 y.o. male.  Diagnosis:   1. Lumbar pain     2. Chronic pain of both knees     3. Poor posture       Agreeable to FDN. Reports 3-4/10 L sided LBP    Objective:     Soft Tissue Palpation Assessment to determine the necessity for Functional Dry Needling  (+) tenderness with increased tissue tension, myofascial restrictions L QL.   Patient provided  verbal consent to FDN.   FDN performed to L QL in  with estim in prone    Assessment:     Patient demonstrated appropriate response to FDN.   Patient with improved overall tissue mobility with decreased tissue tension  upon post treatment  palpation of treated muscle groups after Functional Dry Needling.   Fair rhythmical contractions observed with estim to treated muscle groups    Patient Education/Response:   .     Plans and Goals:     Monitor response to FDN. Continue with FDN in POC as appropriate

## 2018-06-08 NOTE — PROGRESS NOTES
Functional limitations reporting using FOTO Lumbar Spine Survey. Current score: 44%. Previous score: 44%. Pt showed a 0% improvement/decline in function.

## 2018-06-15 ENCOUNTER — CLINICAL SUPPORT (OUTPATIENT)
Dept: REHABILITATION | Facility: HOSPITAL | Age: 63
End: 2018-06-15
Attending: INTERNAL MEDICINE
Payer: MEDICARE

## 2018-06-15 DIAGNOSIS — M54.50 LUMBAR PAIN: ICD-10-CM

## 2018-06-15 DIAGNOSIS — M25.562 CHRONIC PAIN OF BOTH KNEES: ICD-10-CM

## 2018-06-15 DIAGNOSIS — M25.561 CHRONIC PAIN OF BOTH KNEES: ICD-10-CM

## 2018-06-15 DIAGNOSIS — R29.3 POOR POSTURE: ICD-10-CM

## 2018-06-15 DIAGNOSIS — G89.29 CHRONIC PAIN OF BOTH KNEES: ICD-10-CM

## 2018-06-15 PROCEDURE — 97110 THERAPEUTIC EXERCISES: CPT | Mod: PN

## 2018-06-15 PROCEDURE — G8979 MOBILITY GOAL STATUS: HCPCS | Mod: CJ,PN

## 2018-06-15 PROCEDURE — G8980 MOBILITY D/C STATUS: HCPCS | Mod: CL,PN

## 2018-06-15 NOTE — PROGRESS NOTES
DAILY TREATMENT NOTE    DATE: 6/15/2018    Start Time:  1:10 pm   Stop Time:  1:55 pm     PROCEDURES:    TIMED  Procedure Min.   TE 45'                     UNTIMED  Procedure Min.             Total Timed Minutes:  45'  Total Timed Units:  3  Total Untimed Units:    Charges Billed/# of units:  2 TE ( PT was supervising SPT working with another PT)       Progress/Current Status    Subjective:     Patient ID: Medhat Nunez is a 63 y.o. male.  Diagnosis:   1. Lumbar pain     2. Chronic pain of both knees     3. Poor posture       Pain: 6-7 /10 (L) lumbar region prior to therapy session  Pt stated that today was a bad day. Pt stated that some days are good and some days are better. Pt stated that pain is primarily on (L) lumbar region. Pt stated that he has been experiencing worse lumbar pain since Monday. Pt denies doing activity that aggravated his back.     Objective:     Objective measurements were taken and pt participated in therex per log below x 45' while PT was supervising SPT working with another pt.      Date  615/18 6/7/18 5/29/18 5/21/18 5/17/18 5/11/18 5/8/18 5/4/18 5/218 4/27/18 4/23/18 4/20/18 4/17/18 4/13/18 4/10/18 4/6/18 4/3/18 3/27/18  3/23/18   Visit  4/6 3/6 2/6 16/16 16/16 15/16 14/16   12/12 11/12 10/12 9/12 8/12 7/12 6/12 5/12 4/12 3/12 2/12   POC update  6/22/18 6/22/18 6/22/18 6/22/18 6/22/18 6/22/18 5/13/18 5/31/18 5/31/48 5/31/18 5/13/18 5/13/18 5/13/18 5/13/18 5/13/18 5/13/18 5/13/18 5/13/18 3/13/18   G code   10/10 9/10 8/10 7/10 6/10  5/10 4/10 3/10 2/10 1/10 9/10 8/10 7/10 6/10 5/10 4/10 3/10 2/10   FOTO  10/10         w/gcode 4/10 3/10 2/10 1/10 DONE 9/10 8/10 7/10 6/10 5/5 DONE 4/5 3/5     Visit Amt   60.64  1699.76 121.28  1639.12 90.96  1517.84 90.96 118.42   115.42 60.64 90.96 118.42 88.10 90.96 91.70 57.78 118.42   88.10 118.42   57.78 57.78   Total Amt        1426.88 1335.92 1217.50 1101.94 1041.30 950.34 851.92 763.82 672.86 581.16 523.38 404.96 316.86 198.44 140.66   MHP         "                                MT   FDN RS FDN RS FDN JF FDN+15   FDN + 15    NT 10 10   FDN see note FDN see note FDN see note FDN x 10' FDN 15' 10'   bike                 5' NT 5'  7' 7' 5'  5' 5'   5'   Stretches                                        HSS 3x30" B 3x30" B 3x30" B 3x30" B 3x30" B 30"x3 B  30"x3 B 30"X3 B 30"x3 B 30"x3 B 30"x3 B 3x30" B 3x30" B 30"x3 B 30"X3 B 30"x3 B 30"x3 B 30"x3 B 3x30" B   piriformis 3x30" B 3x30" B 3x30" B 3x30"B 3x30" B 30"x3 B 30"x3 B 30"x3 B 30"x3 B 30"x3 B 30"x3 B 3x30" B 3x30" B 30"x3 B 30"x3 B 30"x3 B 30"x3 B 30"x3 B 2x30" B   IT Band stretch  3x30" B 3x30" B 3x30" B 3x30"B 3x30" Manually                               Kimo quad stretch     2x30" w/strap 2x30" w/strap                               QL 3x30" B  3x30" B 3x30"  3x30" 3x30" B 30"x3 R  30"x3 B 30"X3 B 30"x3 B 30"x3 B 30"x3 B 3x30" B 3x30" B   3x30" B   3x30" R MT/sit B 3x30" B   Supine:                                        TAs  10"x10  10"x10  10"x10  10" x 10 10"x10   10"x10 10"x10 10'x10 10"x10 10"x10 10"x10  10"x10 5"x20 5"x20 5"x1`5 5"x15 5"x12 5"x10    LTR  5"x10  5' hold 2' PB 5" hold 2'  5' hold 2' PB 5' hold 2' w/PB 5" hold 2" w/PB 5" hold x2' w/PB 5"xhold x2" w. PB 5" hold x2' w/PB 5" hold x2' w/PB 5' hold w/PB 5' hold w/PB 5" holdx 2 w/ grn PB    5" hold x 2'  W/grn PB  5" hold x 2'  W/grn PB L only 5" hold x 2'  W/grn PB 5" hold x2' w/PB 5" hold x 2' 5" hold 2'   March                                held 30"x1     (D) KTC - 5" hold 2' PB 5" hold 2' PB 5' hold 2" PB 5' hold 2'  PB 5" hold 2' w/PB 5" hold x2' w/PB 5" hold x2' w/PB 5" hold x2' w/PB 5" hold x2' w/PB 5" hold w/PB 5" hold 2' w/ PB 5" hold x2' w/ grn PB 1.5 ' then held 2/2 to (R) groin pain 5" hold x 2' w/grn PB 5" hold x 2' w/grn PB 5" hold x 2' w/PB 5"hold x 2'  W/ Grn PB 5" hold 2' w/PB   Bug                  --     --         --     Bridge  2x15 3" hold  2x15, 3" hold 2x15, 3" hold 2x15 3" hold 2x15 3" hold 2x15 3" hold 5" " "hold  2x15 NT 5" hold  2x15 5" hold  2x15 5"  Hold  2x15 2x15 , 3" hold 2x15 2x15  2x15  2x15 2x15 2x10  2x10   SLR  - 3# 2x15 B 2# 2x15 B 2# 2x15 B 2# 2x15 B 2# 2x15 2# 2x12 B NT 2# 2x10 B 2# 2x10 B 1# 2x15 1# 2x15 1# 2x15 1# 2x10  2x10 B 1x10 B - Next?     SL hip abduction - 3# 2x15 B 2# 2x15 B 2# 2x15 B 2# 2x15 2# 2x15 2# 2x12 B NT # 2x10 B 2# 2x10 B 1# 2x15 B   1# 2x15 B 1# 2x10 B 2x10 B 2x10 B 1x10 B       Prone:                                        Alt LE            NT NT 2x10 B 2x10 B x10 B x10 B x10 B               Alt LE/UE                        --               Seated:                                        TAs                            - 5"x12   5"x10     March                        2x15    2x15  2x10   --     LAQs                        --   - 2x10 Next? --     Lats  BTB 2x15  oot BTB 2x15 OOT BTB 2x15 BTC 2x15 BTC 2x15 NT BTC 2x10 BTC 2x10 GTB 2x15   GTB 2x15   GTB 2x15 GTB 2x15 GTB 2x15  GTB 2x15     Rows  BTB 2x15 oot BTB 2x15 OOT BTB 2x15 BTC 2x15 BTC 2x15 NT BTC 2x10 BTC 2x10 GTB 2x15   GTB 2x15   GTB 2x15  GTB 2x15 GTB 2x15 GTB 2x15 GTB 2x15   UBE                        --         --     Leg Press   oot  - OOT oot 9.0 DL 2x15 8.5  DL  2x15  NT 8.0 2x15 DL 8.0 2x15 DL     7.0 2x10   7.0 2x10  Next Next? --     steamboats  2x12 all directions       next                             Initials  CK  AB/KV CK AB/CK MB 1/6 CK  DH 5/6 JA 4/6 JA 3/6 DH 1/6 JA 1/6 CK JBF CK CK  DH 1/6 CK DH 1/6 CK       Assessment:     See DC summary below    Patient Education/Response:     See DC summary below    Plans and Goals:     DC from PT     REHAB SERVICES OUTPATIENT DISCHARGE SUMMARY  Physical Therapy      Name:  Medhat Nunez  Date:  6/15/18  Date of Evaluation:  3/13/18  Physician:  Viviana Soria MD  Total # Of Visits:20  Diagnosis:    1. Lumbar pain     2. Chronic pain of both knees     3. Poor posture         Physical/Functional Status:  At time of discharge, patient had attended 19 PT follow up sessions " since initial evaluation on 3/13/18. Pt had been making steady progress with his PT treatments and when re-assessed on 5/11/18 pt reported 90% improvement in mid and (R) sided lumbar pain and 60% improvement in (L) sided lumbar pain. However, pt has plateaued with his PT treatments and stated that for the past few days he thinks his lumbar pain has been getting worse. Therefore, recommended that pt follow up with MD. Pt verbalized understanding. Pt was agreeable to being discharged with HEP at this time. See objective measurements below.     Range of Motion/Strength:      Lumbar AROM: Pain/Dysfunction with Movement:   Flexion 50   degrees C/o increased in (L) lumbar region    Extension 5 degrees C/o increased pain in (L) lumbar region   Right side bending 15 degrees C/o  increased pain in (L) lumbar/ QL region   Left side bending 15 degrees C/o mild increased pulling in (R) lumbar/QL region   Right rotation 40  degrees     Left rotation 30 degrees C/o mild increased pain in (L) lumbar region       Hip Right   Left   Pain/Dysfunction with Movement     AROM MMT AROM MMT     Flexion WFL 5/5 WFL 5/5     Extension NT NT NT NT Able to perform good bridge against gravity   Abduction WFL 5/5 WFL 4+/5! pain       Knee   Right     Left   Pain/Dysfunction with Movement     AROM PROM MMT AROM PROM MMT     Flexion 132 NT 5/5 140 NT 5/5     Extension 2 NT 5/5 2 NT 5/5        Ankle Right   Left   Pain/Dysfunction with Movement     AROM MMT AROM MMT     Plantarflexion WFL 5/5 WFL 5/5     Dorsiflexion WFL 5/5 WFL 5/5       FOTO: 69% limited    The patient is to be discharged from our Therapy service for the following reason(s):  Patient has reached the maximum rehab potential for the present time    Degree of Goal Achievement:  Patient has partially met goals    Short Term Goals (4 Weeks): 4/13/18  1. Pt will be compliant with HEP to supplement PT in decreasing pain with functional mobility. - MET  2. Pt will perform and hold TA  "contraction for 10x10" in dynamic sitting activities to demonstrate improved core strength - MET  3. Pt will improve lumbar ROM by 10 deg in all planes to improve functional mobility. - PARTIALLY MET      Long Term Goals (8 Weeks): 5/13/18  1. Pt will improve FOTO score to </= 50% limited to decrease perceived limitation with maintaining/changing body position - MET on 5/11/18  2. Pt will perform and hold TA contraction for 10x10" in dynamic standing activities to demonstrate improved core strength  - MET  3. Pt will improve impaired LE MMTs to 5/5 to improve strength for functional tasks. - PARTIALLY MET  4. Pt will report lumbar pain </= 5/10 during lifting activities to promote functional QOL. - PARTIALLY MET  5. Pt will report lumbar  pain </= 5/10 during lumbar ROM to promote functional mobility. - PARTIALLY MET  6. Pt will report</= 4/10 pain in (R) knee when performing ADLs in order to be able to perform ADLs with less difficulty - NOT MET     New Short Term Goals Status:   Continue with STG 3 and LTG 3,4,5; New Long Term Goal 1) Pt will improve FOTO score to </= 35% limited in order to demo improved functional mobility - NOT MET      Patient Education:  Pt was educated on and given handout on updated HEP. Pt demo/verbalized understanding. (See media section)     Discharge Plan:  Home Program:  HEP. Follow up with MD.     "

## 2018-06-29 DIAGNOSIS — M54.16 LUMBAR RADICULOPATHY: Primary | ICD-10-CM

## 2018-06-29 DIAGNOSIS — G89.4 CHRONIC PAIN SYNDROME: ICD-10-CM

## 2018-11-13 ENCOUNTER — HOSPITAL ENCOUNTER (OUTPATIENT)
Dept: RADIOLOGY | Facility: HOSPITAL | Age: 63
Discharge: HOME | End: 2018-11-13
Attending: INTERNAL MEDICINE
Payer: MEDICARE

## 2018-11-13 DIAGNOSIS — R06.02 SHORTNESS OF BREATH: Primary | ICD-10-CM

## 2018-11-13 DIAGNOSIS — R06.02 SHORTNESS OF BREATH: ICD-10-CM

## 2018-11-13 PROCEDURE — 71046 X-RAY EXAM CHEST 2 VIEWS: CPT | Mod: 26,,, | Performed by: RADIOLOGY

## 2018-11-13 PROCEDURE — 71046 X-RAY EXAM CHEST 2 VIEWS: CPT | Mod: TC,FY

## 2018-12-11 ENCOUNTER — HOSPITAL ENCOUNTER (OUTPATIENT)
Dept: RADIOLOGY | Facility: HOSPITAL | Age: 63
Discharge: HOME | End: 2018-12-11
Attending: PAIN MEDICINE
Payer: MEDICARE

## 2018-12-11 DIAGNOSIS — M79.605 BILATERAL LEG PAIN: ICD-10-CM

## 2018-12-11 DIAGNOSIS — M79.604 BILATERAL LEG PAIN: ICD-10-CM

## 2018-12-11 DIAGNOSIS — M79.605 BILATERAL LEG PAIN: Primary | ICD-10-CM

## 2018-12-11 DIAGNOSIS — M79.604 BILATERAL LEG PAIN: Primary | ICD-10-CM

## 2018-12-11 PROCEDURE — 73560 X-RAY EXAM OF KNEE 1 OR 2: CPT | Mod: 26,LT,, | Performed by: RADIOLOGY

## 2018-12-11 PROCEDURE — 73560 X-RAY EXAM OF KNEE 1 OR 2: CPT | Mod: 26,RT,, | Performed by: RADIOLOGY

## 2018-12-11 PROCEDURE — 73560 X-RAY EXAM OF KNEE 1 OR 2: CPT | Mod: 50,TC,FY

## 2018-12-17 ENCOUNTER — HOSPITAL ENCOUNTER (OUTPATIENT)
Dept: RADIOLOGY | Facility: HOSPITAL | Age: 63
Discharge: HOME | End: 2018-12-17
Attending: PAIN MEDICINE
Payer: MEDICARE

## 2018-12-17 DIAGNOSIS — M25.569 KNEE PAIN: ICD-10-CM

## 2018-12-17 DIAGNOSIS — M25.569 KNEE PAIN: Primary | ICD-10-CM

## 2018-12-17 PROCEDURE — 73560 X-RAY EXAM OF KNEE 1 OR 2: CPT | Mod: TC,FY,LT

## 2018-12-17 PROCEDURE — 73560 X-RAY EXAM OF KNEE 1 OR 2: CPT | Mod: 26,LT,, | Performed by: RADIOLOGY

## 2019-03-19 DIAGNOSIS — R07.1 CHEST PAIN ON BREATHING: Primary | ICD-10-CM

## 2019-03-20 ENCOUNTER — CLINICAL SUPPORT (OUTPATIENT)
Dept: CARDIOLOGY | Facility: CLINIC | Age: 64
End: 2019-03-20
Attending: FAMILY MEDICINE
Payer: MEDICARE

## 2019-03-20 VITALS — WEIGHT: 235 LBS | HEIGHT: 69 IN | BODY MASS INDEX: 34.8 KG/M2

## 2019-03-20 DIAGNOSIS — R07.1 CHEST PAIN ON BREATHING: Primary | ICD-10-CM

## 2019-03-20 DIAGNOSIS — R07.1 CHEST PAIN ON BREATHING: ICD-10-CM

## 2019-03-20 LAB
ASCENDING AORTA: 3 CM
BSA FOR ECHO PROCEDURE: 2.28 M2
CV ECHO LV RWT: 0.27 CM
CV STRESS BASE HR: 94 BPM
DIASTOLIC BLOOD PRESSURE: 87 MMHG
DOP CALC LVOT AREA: 4.56 CM2
DOP CALC LVOT DIAMETER: 2.41 CM
DOP CALC LVOT PEAK VEL: 0.89 M/S
DOP CALC LVOT STROKE VOLUME: 80.34 CM3
DOP CALCLVOT PEAK VEL VTI: 17.62 CM
E WAVE DECELERATION TIME: 191.88 MSEC
E/A RATIO: 1.02
E/E' RATIO: 7.38
ECHO LV POSTERIOR WALL: 0.7 CM (ref 0.6–1.1)
FRACTIONAL SHORTENING: 41 % (ref 28–44)
INTERVENTRICULAR SEPTUM: 0.69 CM (ref 0.6–1.1)
IVRT: 0.08 MSEC
LA MAJOR: 4.91 CM
LA MINOR: 4.91 CM
LA WIDTH: 3.44 CM
LEFT ATRIUM SIZE: 3.6 CM
LEFT ATRIUM VOLUME INDEX: 23.4 ML/M2
LEFT ATRIUM VOLUME: 51.68 CM3
LEFT INTERNAL DIMENSION IN SYSTOLE: 3.05 CM (ref 2.1–4)
LEFT VENTRICLE DIASTOLIC VOLUME INDEX: 58.39 ML/M2
LEFT VENTRICLE DIASTOLIC VOLUME: 129.18 ML
LEFT VENTRICLE MASS INDEX: 54.8 G/M2
LEFT VENTRICLE SYSTOLIC VOLUME INDEX: 16.5 ML/M2
LEFT VENTRICLE SYSTOLIC VOLUME: 36.51 ML
LEFT VENTRICULAR INTERNAL DIMENSION IN DIASTOLE: 5.19 CM (ref 3.5–6)
LEFT VENTRICULAR MASS: 121.32 G
LV LATERAL E/E' RATIO: 7.38
LV SEPTAL E/E' RATIO: 7.38
MV PEAK A VEL: 0.58 M/S
MV PEAK E VEL: 0.59 M/S
OHS CV CPX 1 MINUTE RECOVERY HEART RATE: 111 BPM
OHS CV CPX 85 PERCENT MAX PREDICTED HEART RATE MALE: 133
OHS CV CPX ESTIMATED METS: 11
OHS CV CPX MAX PREDICTED HEART RATE: 157
OHS CV CPX PATIENT IS FEMALE: 0
OHS CV CPX PATIENT IS MALE: 1
OHS CV CPX PEAK DIASTOLIC BLOOD PRESSURE: 56 MMHG
OHS CV CPX PEAK HEAR RATE: 133 BPM
OHS CV CPX PEAK RATE PRESSURE PRODUCT: NORMAL
OHS CV CPX PEAK SYSTOLIC BLOOD PRESSURE: 199 MMHG
OHS CV CPX PERCENT MAX PREDICTED HEART RATE ACHIEVED: 85
OHS CV CPX PERCENT TARGET HEART RATE ACHIEVED: 100
OHS CV CPX RATE PRESSURE PRODUCT PRESENTING: NORMAL
OHS CV CPX TARGET HEART RATE: 133
PULM VEIN S/D RATIO: 1.65
PV PEAK D VEL: 0.31 M/S
PV PEAK S VEL: 0.51 M/S
RA MAJOR: 4.29 CM
RA PRESSURE: 3 MMHG
RA WIDTH: 2.87 CM
RIGHT VENTRICULAR END-DIASTOLIC DIMENSION: 2.81 CM
SINUS: 3.46 CM
STJ: 3.08 CM
STRESS ECHO POST EXERCISE DUR MIN: 6 MIN
STRESS ECHO POST EXERCISE DUR SEC: 50
SYSTOLIC BLOOD PRESSURE: 159 MMHG
TDI LATERAL: 0.08
TDI SEPTAL: 0.08
TDI: 0.08
TRICUSPID ANNULAR PLANE SYSTOLIC EXCURSION: 2.3 CM

## 2019-03-20 PROCEDURE — 99999 PR PBB SHADOW E&M-EST. PATIENT-LVL I: ICD-10-PCS | Mod: PBBFAC,,,

## 2019-03-20 PROCEDURE — 93351 ECHOCARDIOGRAM STRESS TEST (CUPID ONLY): ICD-10-PCS | Mod: S$GLB,,, | Performed by: INTERNAL MEDICINE

## 2019-03-20 PROCEDURE — 93351 STRESS TTE COMPLETE: CPT | Mod: S$GLB,,, | Performed by: INTERNAL MEDICINE

## 2019-03-20 PROCEDURE — 99999 PR PBB SHADOW E&M-EST. PATIENT-LVL I: CPT | Mod: PBBFAC,,,

## 2019-05-30 DIAGNOSIS — R06.09 DOE (DYSPNEA ON EXERTION): Primary | ICD-10-CM

## 2019-05-31 ENCOUNTER — HOSPITAL ENCOUNTER (OUTPATIENT)
Dept: PULMONOLOGY | Facility: CLINIC | Age: 64
Discharge: HOME | End: 2019-05-31
Payer: MEDICARE

## 2019-05-31 ENCOUNTER — HOSPITAL ENCOUNTER (OUTPATIENT)
Dept: RADIOLOGY | Facility: HOSPITAL | Age: 64
Discharge: HOME | End: 2019-05-31
Attending: INTERNAL MEDICINE
Payer: MEDICARE

## 2019-05-31 ENCOUNTER — OFFICE VISIT (OUTPATIENT)
Dept: PULMONOLOGY | Facility: CLINIC | Age: 64
End: 2019-05-31
Payer: MEDICARE

## 2019-05-31 DIAGNOSIS — R06.09 DOE (DYSPNEA ON EXERTION): ICD-10-CM

## 2019-05-31 DIAGNOSIS — R06.00 DYSPNEA AND RESPIRATORY ABNORMALITY: Primary | ICD-10-CM

## 2019-05-31 DIAGNOSIS — R06.89 DYSPNEA AND RESPIRATORY ABNORMALITY: Primary | ICD-10-CM

## 2019-05-31 LAB
PRE FEV1 FVC: 81
PRE FEV1: 2.89
PRE FVC: 3.57
PREDICTED FEV1 FVC: 80
PREDICTED FEV1: 3.31
PREDICTED FVC: 4.12

## 2019-05-31 PROCEDURE — 71046 XR CHEST PA AND LATERAL: ICD-10-PCS | Mod: 26,,, | Performed by: RADIOLOGY

## 2019-05-31 PROCEDURE — 94729 DIFFUSING CAPACITY: CPT | Mod: S$GLB,,, | Performed by: INTERNAL MEDICINE

## 2019-05-31 PROCEDURE — 99999 PR PBB SHADOW E&M-EST. PATIENT-LVL I: ICD-10-PCS | Mod: PBBFAC,,, | Performed by: INTERNAL MEDICINE

## 2019-05-31 PROCEDURE — 99213 PR OFFICE/OUTPT VISIT, EST, LEVL III, 20-29 MIN: ICD-10-PCS | Mod: 25,S$GLB,, | Performed by: INTERNAL MEDICINE

## 2019-05-31 PROCEDURE — 99999 PR PBB SHADOW E&M-EST. PATIENT-LVL I: CPT | Mod: PBBFAC,,, | Performed by: INTERNAL MEDICINE

## 2019-05-31 PROCEDURE — 71046 X-RAY EXAM CHEST 2 VIEWS: CPT | Mod: 26,,, | Performed by: RADIOLOGY

## 2019-05-31 PROCEDURE — 94729 PR C02/MEMBANE DIFFUSE CAPACITY: ICD-10-PCS | Mod: S$GLB,,, | Performed by: INTERNAL MEDICINE

## 2019-05-31 PROCEDURE — 94010 BREATHING CAPACITY TEST: CPT | Mod: S$GLB,,, | Performed by: INTERNAL MEDICINE

## 2019-05-31 PROCEDURE — 99213 OFFICE O/P EST LOW 20 MIN: CPT | Mod: 25,S$GLB,, | Performed by: INTERNAL MEDICINE

## 2019-05-31 PROCEDURE — 71046 X-RAY EXAM CHEST 2 VIEWS: CPT | Mod: TC,FY

## 2019-05-31 PROCEDURE — 94010 BREATHING CAPACITY TEST: ICD-10-PCS | Mod: S$GLB,,, | Performed by: INTERNAL MEDICINE

## 2019-05-31 NOTE — LETTER
June 5, 2019      Bridgette Mcwilliams MD  [address]  [facility] [address]           [facility]  [address]  [place] [address]  Phone: [phone]          Patient: Medhat Nunez   MR Number: [id number]   YOB: 1955   Date of Visit: 5/31/2019       Dear Dr. Bridgette Mcwilliams:    Thank you for referring Medhat Nunez to me for evaluation. Attached you will find relevant portions of my assessment and plan of care.    If you have questions, please do not hesitate to call me. I look forward to following Medhat Nunez along with you.    Sincerely,    Dom Ramirez  CC:  No Recipients    If you would like to receive this communication electronically, please contact [email or web address] or [phone] to request more information on [employer] Link access.    For providers and/or their staff who would like to refer a patient to [facility], please contact us through our one-stop-shop provider referral line, [facility], at [phone].    If you feel you have received this communication in error or would no longer like to receive these types of communications, please e-mail [email or web address]

## 2019-06-27 NOTE — PROGRESS NOTES
Subjective:      Patient ID: Medhat Nunez is a 64 y.o. male.    Chief Complaint: Shortness of Breath    HPI  63 yo male referred for assessment of dyspnea. He is a non smoker. He has hx of anterior cervical fusion and generalized arthralgias.   Review of Systems   Constitutional: Negative.    HENT: Negative.    Eyes: Negative.    Respiratory: Negative.  Positive for dyspnea on extertion. Negative for use of rescue inhaler.    Cardiovascular: Negative.    Genitourinary: Negative.    Musculoskeletal: Positive for arthralgias and back pain.        S/P Anterior cervical fusion   Skin: Negative.    Gastrointestinal: Negative.    Neurological: Negative.    Psychiatric/Behavioral: Negative.      Objective:     Physical Exam   Constitutional: He is oriented to person, place, and time. He appears well-developed and well-nourished.   BMI:34   HENT:   Head: Normocephalic and atraumatic.   Right Ear: External ear normal.   Left Ear: External ear normal.   Eyes: Pupils are equal, round, and reactive to light. Conjunctivae and EOM are normal.   Neck: Normal range of motion. Neck supple.   Cardiovascular: Normal rate, regular rhythm and normal heart sounds.   Pulmonary/Chest: Effort normal and breath sounds normal.   Clear to A& P    Spirometry is nomral    Chest x-ray is clear    DLCO is normal.   Abdominal: Soft. Bowel sounds are normal.   Musculoskeletal: Normal range of motion.   Neurological: He is alert and oriented to person, place, and time. He has normal reflexes.   Skin: Skin is warm and dry.   Psychiatric: He has a normal mood and affect. His behavior is normal. Judgment and thought content normal.       Assessment:     No diagnosis found.  Outpatient Encounter Medications as of 5/31/2019   Medication Sig Dispense Refill    multivitamin capsule Take 1 capsule by mouth once daily.       No facility-administered encounter medications on file as of 5/31/2019.      No orders of the defined types were placed in this  encounter.    Plan:    IMP Dyspnea due to age weight and de conditioning  All lung parameters are normal.  BMI:.34  Sa02: 95%.   Problem List Items Addressed This Visit     None

## 2019-10-04 NOTE — PROGRESS NOTES
"5DAILY TREATMENT NOTE    DATE: 6/7/2018    Start Time:  3:05 pm  Stop Time:  4:00 pm    PROCEDURES:    TIMED  Procedure Min.   TE 50   FDN 5                 UNTIMED  Procedure Min.             Total Timed Minutes: 55  Total Timed Units:  4  Total Untimed Units:  0  Charges Billed/# of units:  (2TE)      Progress/Current Status    Subjective:     Patient ID: Medhat Nunez is a 63 y.o. male.  Diagnosis: No diagnosis found.  Pain: 3-4 /10  Pt reports feeling intermittant pain at L lumbar region, that is intermittent. Pt reported feeling better after 3 days after the needling last treatment session.     Objective:     Pt initiated treatment session today with TE x 50' 1:1 with SPT with supervision of Michelle Alanis, PT, f/b FDN (see note by Edin Song, PT)     Date  6/7/18 5/29/18 5/21/18 5/17/18 5/11/18 5/8/18 5/4/18 5/218 4/27/18 4/23/18 4/20/18 4/17/18 4/13/18 4/10/18 4/6/18 4/3/18 3/27/18  3/23/18   Visit  3/6 2/6 16/16 16/16 15/16 14/16   12/12 11/12 10/12 9/12 8/12 7/12 6/12 5/12 4/12 3/12 2/12   POC update  6/22/18 6/22/18 6/22/18 6/22/18 6/22/18 5/13/18 5/31/18 5/31/48 5/31/18 5/13/18 5/13/18 5/13/18 5/13/18 5/13/18 5/13/18 5/13/18 5/13/18 3/13/18   G code  10/10 9/10 8/10 7/10 6/10  5/10 4/10 3/10 2/10 1/10 9/10 8/10 7/10 6/10 5/10 4/10 3/10 2/10   FOTO 10/10         w/gcode 4/10 3/10 2/10 1/10 DONE 9/10 8/10 7/10 6/10 5/5 DONE 4/5 3/5     Visit Amt  60.64  1699.76 121.28  1639.12 90.96  1517.84 90.96 118.42   115.42 60.64 90.96 118.42 88.10 90.96 91.70 57.78 118.42   88.10 118.42   57.78 57.78   Total Amt       1426.88 1335.92 1217.50 1101.94 1041.30 950.34 851.92 763.82 672.86 581.16 523.38 404.96 316.86 198.44 140.66   MHP                                       MT  FDN RS FDN RS FDN JF FDN+15   FDN + 15    NT 10 10   FDN see note FDN see note FDN see note FDN x 10' FDN 15' 10'   bike                5' NT 5'  7' 7' 5'  5' 5'   5'   Stretches                                       HSS 3x30" B 3x30" B " "3x30" B 3x30" B 30"x3 B  30"x3 B 30"X3 B 30"x3 B 30"x3 B 30"x3 B 3x30" B 3x30" B 30"x3 B 30"X3 B 30"x3 B 30"x3 B 30"x3 B 3x30" B   piriformis 3x30" B 3x30" B 3x30"B 3x30" B 30"x3 B 30"x3 B 30"x3 B 30"x3 B 30"x3 B 30"x3 B 3x30" B 3x30" B 30"x3 B 30"x3 B 30"x3 B 30"x3 B 30"x3 B 2x30" B   IT Band stretch  3x30" B 3x30" B 3x30"B 3x30" Manually                               Kimo quad stretch    2x30" w/strap 2x30" w/strap                               QL 3x30" B 3x30"  3x30" 3x30" B 30"x3 R  30"x3 B 30"X3 B 30"x3 B 30"x3 B 30"x3 B 3x30" B 3x30" B   3x30" B   3x30" R MT/sit B 3x30" B   Supine:                                       TAs  10"x10  10"x10  10" x 10 10"x10   10"x10 10"x10 10'x10 10"x10 10"x10 10"x10  10"x10 5"x20 5"x20 5"x1`5 5"x15 5"x12 5"x10    LTR  5' hold 2' PB 5" hold 2'  5' hold 2' PB 5' hold 2' w/PB 5" hold 2" w/PB 5" hold x2' w/PB 5"xhold x2" w. PB 5" hold x2' w/PB 5" hold x2' w/PB 5' hold w/PB 5' hold w/PB 5" holdx 2 w/ grn PB    5" hold x 2'  W/grn PB  5" hold x 2'  W/grn PB L only 5" hold x 2'  W/grn PB 5" hold x2' w/PB 5" hold x 2' 5" hold 2'   March                               held 30"x1     (D) KTC 5" hold 2' PB 5" hold 2' PB 5' hold 2" PB 5' hold 2'  PB 5" hold 2' w/PB 5" hold x2' w/PB 5" hold x2' w/PB 5" hold x2' w/PB 5" hold x2' w/PB 5" hold w/PB 5" hold 2' w/ PB 5" hold x2' w/ grn PB 1.5 ' then held 2/2 to (R) groin pain 5" hold x 2' w/grn PB 5" hold x 2' w/grn PB 5" hold x 2' w/PB 5"hold x 2'  W/ Grn PB 5" hold 2' w/PB   Bug                 --     --         --     Bridge  2x15, 3" hold 2x15, 3" hold 2x15 3" hold 2x15 3" hold 2x15 3" hold 5" hold  2x15 NT 5" hold  2x15 5" hold  2x15 5"  Hold  2x15 2x15 , 3" hold 2x15 2x15  2x15  2x15 2x15 2x10  2x10   SLR  3# 2x15 B 2# 2x15 B 2# 2x15 B 2# 2x15 B 2# 2x15 2# 2x12 B NT 2# 2x10 B 2# 2x10 B 1# 2x15 1# 2x15 1# 2x15 1# 2x10  2x10 B 1x10 B - Next?     SL hip abduction 3# 2x15 B 2# 2x15 B 2# 2x15 B 2# 2x15 2# 2x15 2# 2x12 B NT # 2x10 B 2# 2x10 B " "1# 2x15 B   1# 2x15 B 1# 2x10 B 2x10 B 2x10 B 1x10 B       Prone:                                       Alt LE           NT NT 2x10 B 2x10 B x10 B x10 B x10 B               Alt LE/UE                       --               Seated:                                       TAs                           - 5"x12   5"x10     March                       2x15    2x15  2x10   --     LAQs                       --   - 2x10 Next? --     Lats  oot BTB 2x15 OOT BTB 2x15 BTC 2x15 BTC 2x15 NT BTC 2x10 BTC 2x10 GTB 2x15   GTB 2x15   GTB 2x15 GTB 2x15 GTB 2x15  GTB 2x15     Rows  oot BTB 2x15 OOT BTB 2x15 BTC 2x15 BTC 2x15 NT BTC 2x10 BTC 2x10 GTB 2x15   GTB 2x15   GTB 2x15  GTB 2x15 GTB 2x15 GTB 2x15 GTB 2x15   UBE                       --         --     Leg Press  oot  - OOT oot 9.0 DL 2x15 8.5  DL  2x15  NT 8.0 2x15 DL 8.0 2x15 DL     7.0 2x10   7.0 2x10  Next Next? --     steamboats 2x12 all directions       next                             Initials  AB/KV CK AB/CK MB 1/6 CK  DH 5/6 JA 4/6 JA 3/6 DH 1/6 JA 1/6 CK JBF CK CK  DH 1/6 CK DH 1/6 CK          Assessment:     Pt able to progress TE by increasing weight for SLR and Sl abduction, and addition of steamboats, and pt tolerated the progressions and all TE today without complaints of increased pain. Pt reports that he has a 1-2/10 pain upon leaving treatment session today.     Patient Education/Response:     Pt to continue with HEP, pt verbalized understanding.    Plans and Goals:     Continue POC as pt tolerates.   Short Term Goals (4 Weeks): 4/13/18  1. Pt will be compliant with HEP to supplement PT in decreasing pain with functional mobility. - MET  2. Pt will perform and hold TA contraction for 10x10" in dynamic sitting activities to demonstrate improved core strength - MET  3. Pt will improve lumbar ROM by 10 deg in all planes to improve functional mobility. - PARTIALLY MET      Long Term Goals (8 Weeks): 5/13/18  1. Pt will improve FOTO score to </= 50% limited to decrease " "perceived limitation with maintaining/changing body position - MET  2. Pt will perform and hold TA contraction for 10x10" in dynamic standing activities to demonstrate improved core strength  - MET  3. Pt will improve impaired LE MMTs to 5/5 to improve strength for functional tasks. - PARTIALLY MET  4. Pt will report lumbar pain </= 5/10 during lifting activities to promote functional QOL. - PARTIALLY MET  5. Pt will report lumbar  pain </= 5/10 during lumbar ROM to promote functional mobility. - PARTIALLY MET  6. Pt will report</= 4/10 pain in (R) knee when performing ADLs in order to be able to perform ADLs with less difficulty - MET     New Short Term Goals Status:   Continue with STG 3 and LTG 3,4,5; New Long Term Goal 1) Pt will improve FOTO score to </= 35% limited in order to demo improved functional mobility     Rayo Erickson, SPT      " oral

## 2020-06-09 ENCOUNTER — HOSPITAL ENCOUNTER (OUTPATIENT)
Dept: RADIOLOGY | Facility: HOSPITAL | Age: 65
Discharge: HOME | End: 2020-06-09
Attending: NURSE PRACTITIONER
Payer: MEDICARE

## 2020-06-09 DIAGNOSIS — M25.512 BILATERAL SHOULDER PAIN: ICD-10-CM

## 2020-06-09 DIAGNOSIS — M25.552 BILATERAL HIP PAIN: ICD-10-CM

## 2020-06-09 DIAGNOSIS — M47.26 OTHER SPONDYLOSIS WITH RADICULOPATHY, LUMBAR REGION: ICD-10-CM

## 2020-06-09 DIAGNOSIS — M25.551 BILATERAL HIP PAIN: ICD-10-CM

## 2020-06-09 DIAGNOSIS — M25.511 BILATERAL SHOULDER PAIN: ICD-10-CM

## 2020-06-09 PROCEDURE — 73030 X-RAY EXAM OF SHOULDER: CPT | Mod: 26,50,, | Performed by: RADIOLOGY

## 2020-06-09 PROCEDURE — 73521 XR HIPS BILATERAL 2 VIEW INCL AP PELVIS: ICD-10-PCS | Mod: 26,,, | Performed by: RADIOLOGY

## 2020-06-09 PROCEDURE — 73030 XR SHOULDER COMPLETE 2 OR MORE VIEWS BILATERAL: ICD-10-PCS | Mod: 26,50,, | Performed by: RADIOLOGY

## 2020-06-09 PROCEDURE — 73521 X-RAY EXAM HIPS BI 2 VIEWS: CPT | Mod: TC,FY

## 2020-06-09 PROCEDURE — 73030 X-RAY EXAM OF SHOULDER: CPT | Mod: TC,50,FY

## 2020-06-09 PROCEDURE — 73521 X-RAY EXAM HIPS BI 2 VIEWS: CPT | Mod: 26,,, | Performed by: RADIOLOGY

## 2020-08-07 ENCOUNTER — OFFICE VISIT (OUTPATIENT)
Dept: GASTROENTEROLOGY | Facility: CLINIC | Age: 65
End: 2020-08-07
Payer: MEDICARE

## 2020-08-07 ENCOUNTER — LAB VISIT (OUTPATIENT)
Dept: LAB | Facility: HOSPITAL | Age: 65
End: 2020-08-07
Attending: INTERNAL MEDICINE
Payer: MEDICARE

## 2020-08-07 VITALS — BODY MASS INDEX: 35.3 KG/M2 | HEIGHT: 69 IN | WEIGHT: 238.31 LBS

## 2020-08-07 DIAGNOSIS — K59.04 CHRONIC IDIOPATHIC CONSTIPATION: Primary | ICD-10-CM

## 2020-08-07 DIAGNOSIS — R73.03 PREDIABETES: ICD-10-CM

## 2020-08-07 DIAGNOSIS — R14.0 ABDOMINAL DISTENSION (GASEOUS): ICD-10-CM

## 2020-08-07 DIAGNOSIS — K59.04 CHRONIC IDIOPATHIC CONSTIPATION: ICD-10-CM

## 2020-08-07 LAB
ALBUMIN SERPL BCP-MCNC: 4.4 G/DL (ref 3.5–5.2)
ALP SERPL-CCNC: 72 U/L (ref 55–135)
ALT SERPL W/O P-5'-P-CCNC: 25 U/L (ref 10–44)
ANION GAP SERPL CALC-SCNC: 8 MMOL/L (ref 8–16)
AST SERPL-CCNC: 20 U/L (ref 10–40)
BASOPHILS # BLD AUTO: 0.02 K/UL (ref 0–0.2)
BASOPHILS NFR BLD: 0.3 % (ref 0–1.9)
BILIRUB SERPL-MCNC: 0.9 MG/DL (ref 0.1–1)
BUN SERPL-MCNC: 28 MG/DL (ref 8–23)
CALCIUM SERPL-MCNC: 9.4 MG/DL (ref 8.7–10.5)
CHLORIDE SERPL-SCNC: 107 MMOL/L (ref 95–110)
CO2 SERPL-SCNC: 23 MMOL/L (ref 23–29)
CREAT SERPL-MCNC: 1.1 MG/DL (ref 0.5–1.4)
DIFFERENTIAL METHOD: NORMAL
EOSINOPHIL # BLD AUTO: 0.4 K/UL (ref 0–0.5)
EOSINOPHIL NFR BLD: 5.6 % (ref 0–8)
ERYTHROCYTE [DISTWIDTH] IN BLOOD BY AUTOMATED COUNT: 13.2 % (ref 11.5–14.5)
EST. GFR  (AFRICAN AMERICAN): >60 ML/MIN/1.73 M^2
EST. GFR  (NON AFRICAN AMERICAN): >60 ML/MIN/1.73 M^2
ESTIMATED AVG GLUCOSE: 143 MG/DL (ref 68–131)
GLUCOSE SERPL-MCNC: 133 MG/DL (ref 70–110)
HBA1C MFR BLD HPLC: 6.6 % (ref 4–5.6)
HCT VFR BLD AUTO: 46.1 % (ref 40–54)
HGB BLD-MCNC: 15.5 G/DL (ref 14–18)
IMM GRANULOCYTES # BLD AUTO: 0.02 K/UL (ref 0–0.04)
IMM GRANULOCYTES NFR BLD AUTO: 0.3 % (ref 0–0.5)
LYMPHOCYTES # BLD AUTO: 2.6 K/UL (ref 1–4.8)
LYMPHOCYTES NFR BLD: 35.1 % (ref 18–48)
MCH RBC QN AUTO: 29.8 PG (ref 27–31)
MCHC RBC AUTO-ENTMCNC: 33.6 G/DL (ref 32–36)
MCV RBC AUTO: 89 FL (ref 82–98)
MONOCYTES # BLD AUTO: 0.6 K/UL (ref 0.3–1)
MONOCYTES NFR BLD: 8.3 % (ref 4–15)
NEUTROPHILS # BLD AUTO: 3.8 K/UL (ref 1.8–7.7)
NEUTROPHILS NFR BLD: 50.4 % (ref 38–73)
NRBC BLD-RTO: 0 /100 WBC
PLATELET # BLD AUTO: 207 K/UL (ref 150–350)
PMV BLD AUTO: 10.9 FL (ref 9.2–12.9)
POTASSIUM SERPL-SCNC: 4.5 MMOL/L (ref 3.5–5.1)
PROT SERPL-MCNC: 7.7 G/DL (ref 6–8.4)
RBC # BLD AUTO: 5.21 M/UL (ref 4.6–6.2)
SODIUM SERPL-SCNC: 138 MMOL/L (ref 136–145)
TSH SERPL DL<=0.005 MIU/L-ACNC: 0.91 UIU/ML (ref 0.4–4)
WBC # BLD AUTO: 7.47 K/UL (ref 3.9–12.7)

## 2020-08-07 PROCEDURE — 99204 PR OFFICE/OUTPT VISIT, NEW, LEVL IV, 45-59 MIN: ICD-10-PCS | Mod: S$GLB,,, | Performed by: INTERNAL MEDICINE

## 2020-08-07 PROCEDURE — 84443 ASSAY THYROID STIM HORMONE: CPT

## 2020-08-07 PROCEDURE — 83036 HEMOGLOBIN GLYCOSYLATED A1C: CPT

## 2020-08-07 PROCEDURE — 99999 PR PBB SHADOW E&M-EST. PATIENT-LVL III: CPT | Mod: PBBFAC,,, | Performed by: INTERNAL MEDICINE

## 2020-08-07 PROCEDURE — 99204 OFFICE O/P NEW MOD 45 MIN: CPT | Mod: S$GLB,,, | Performed by: INTERNAL MEDICINE

## 2020-08-07 PROCEDURE — 86677 HELICOBACTER PYLORI ANTIBODY: CPT

## 2020-08-07 PROCEDURE — 36415 COLL VENOUS BLD VENIPUNCTURE: CPT

## 2020-08-07 PROCEDURE — 80053 COMPREHEN METABOLIC PANEL: CPT

## 2020-08-07 PROCEDURE — 85025 COMPLETE CBC W/AUTO DIFF WBC: CPT

## 2020-08-07 PROCEDURE — 99999 PR PBB SHADOW E&M-EST. PATIENT-LVL III: ICD-10-PCS | Mod: PBBFAC,,, | Performed by: INTERNAL MEDICINE

## 2020-08-07 RX ORDER — LISINOPRIL 40 MG/1
40 TABLET ORAL DAILY
COMMUNITY
End: 2021-12-08 | Stop reason: SDUPTHER

## 2020-08-07 RX ORDER — GABAPENTIN 100 MG/1
100 CAPSULE ORAL 3 TIMES DAILY
COMMUNITY
End: 2021-07-09

## 2020-08-07 NOTE — PROGRESS NOTES
GASTROENTEROLOGY CLINIC NOTE    Reason for visit: The primary encounter diagnosis was Chronic idiopathic constipation. Diagnoses of Abdominal distension (gaseous) and Prediabetes  were also pertinent to this visit.  Referring provider/PCP: Bridgette Mcwilliams MD    HPI:  Medhat Nunez is a 65 y.o. male here today for constipation and bloating.  New patient.    He has had fairly longstanding issues of constipation but here over the past few months it has gotten much worse.  He will go over 4 days without having a bowel movement, and this is followed by some liquid stools.  He has associated abdominal bloating.  He mentions some right-sided hip pain that he is unsure if this is related or not.  He does not see any blood in his stool.  He feels full after eating.  He cannot tolerate a larger meal.  There is some abdominal pain associated over the bilateral lower quadrants.  It is not in a particular location at all times.  There is no inguinal pain or inguinal bulges.  Recently started linzess 145 and not making much difference.    No family hx crc    Prior Endoscopy:  EGD:  Colon:  Years ago, told had big polyp and had to come back twice for repeat.  Last one he believes was 2 years ago.     (Portions of this note were dictated using voice recognition software and may contain dictation related errors in spelling/grammar/syntax not found on text review)    Review of Systems   Constitutional: Negative for fever and weight loss.   HENT: Negative for nosebleeds and sore throat.    Eyes: Negative for double vision and photophobia.   Respiratory: Negative for cough and shortness of breath.    Cardiovascular: Negative for chest pain and leg swelling.   Gastrointestinal: Positive for abdominal pain, constipation and nausea. Negative for blood in stool.   Genitourinary: Negative for dysuria and hematuria.   Musculoskeletal: Negative for joint pain and neck pain.   Skin: Negative for itching and rash.   Neurological:  "Negative for dizziness and headaches.   Psychiatric/Behavioral: Negative for hallucinations. The patient does not have insomnia.        Past Medical History: has no past medical history on file.    Past Surgical History: has a past surgical history that includes Hernia repair; Shoulder surgery (Right); and Cervical fusion.    Family History:family history is not on file.    Allergies: Review of patient's allergies indicates:  No Known Allergies    Social History: reports that he has never smoked. He does not have any smokeless tobacco history on file. He reports that he does not drink alcohol.    Home medications:   Current Outpatient Medications on File Prior to Visit   Medication Sig Dispense Refill    gabapentin (NEURONTIN) 100 MG capsule Take 100 mg by mouth 3 (three) times daily.      linaCLOtide (LINZESS) 145 mcg Cap capsule Take 145 mcg by mouth before breakfast.      lisinopriL (PRINIVIL,ZESTRIL) 40 MG tablet Take 40 mg by mouth once daily.      multivitamin capsule Take 1 capsule by mouth once daily.       No current facility-administered medications on file prior to visit.        Vital signs:  Ht 5' 9" (1.753 m)   Wt 108.1 kg (238 lb 5.1 oz)   BMI 35.19 kg/m²     Physical Exam  Vitals signs reviewed.   Constitutional:       General: He is not in acute distress.     Appearance: He is well-developed.   HENT:      Head: Normocephalic and atraumatic.   Eyes:      General: No scleral icterus.     Conjunctiva/sclera: Conjunctivae normal.   Neck:      Musculoskeletal: Neck supple.      Thyroid: No thyromegaly.   Cardiovascular:      Rate and Rhythm: Normal rate and regular rhythm.   Pulmonary:      Effort: Pulmonary effort is normal. No respiratory distress.      Breath sounds: Normal breath sounds.   Abdominal:      General: There is distension.      Palpations: Abdomen is soft. There is no mass.      Hernia: No hernia is present.      Comments: Tympanic in epigastrium   Musculoskeletal: Normal range of " motion.         General: No tenderness.   Lymphadenopathy:      Cervical: No cervical adenopathy.   Skin:     General: Skin is warm and dry.      Findings: No rash.   Neurological:      Mental Status: He is alert and oriented to person, place, and time.      Gait: Gait normal.   Psychiatric:         Mood and Affect: Mood normal.         Behavior: Behavior normal.         Routine labs:  Lab Results   Component Value Date    WBC 13.56 (H) 06/15/2016    HGB 14.7 06/15/2016    HCT 44.0 06/15/2016    MCV 91 06/15/2016     06/15/2016     No results found for: INR  No results found for: IRON, FERRITIN, TIBC, FESATURATED  Lab Results   Component Value Date     06/15/2016    K 4.5 06/15/2016     06/15/2016    CO2 25 06/15/2016    BUN 18 06/15/2016    CREATININE 0.8 06/15/2016     Lab Results   Component Value Date    ALBUMIN 4.0 06/13/2016    ALT 27 06/13/2016    AST 17 06/13/2016    ALKPHOS 72 06/13/2016    BILITOT 0.9 06/13/2016     No results found for: GLUCOSE    I have reviewed prior labs, imaging, notes from last month      Assessment:  1. Chronic idiopathic constipation    2. Abdominal distension (gaseous)    3. Prediabetes       Unclear ideology of constipation but not responding to Linzess given by his primary doctor.  Has some distention and bloating associated with this.    Apparently also had a larger colon polyp that was removed many years ago and he is unclear of the exact follow-up but he does believe the last colonoscopy was within 2 years or so.  We will obviously need to obtain records.    Plan:  Orders Placed This Encounter    CT Abdomen Pelvis W Wo Contrast    CBC auto differential    TSH    Comprehensive metabolic panel    Hemoglobin A1C    H. PYLORI ANTIBODY, IGG     Start with blood work  CT scan with PO and IV contrast.    Continue linzess 145, can take double and see if more effective.    Need records from last GI doctor and colonoscopy reports    RTC in 4 weeks,  Will likely  need colonoscopy      Oliverio Alan MD  [facility]

## 2020-08-10 LAB — H PYLORI IGG SERPL QL IA: NEGATIVE

## 2020-08-13 ENCOUNTER — HOSPITAL ENCOUNTER (OUTPATIENT)
Dept: RADIOLOGY | Facility: HOSPITAL | Age: 65
Discharge: HOME | End: 2020-08-13
Attending: INTERNAL MEDICINE
Payer: MEDICARE

## 2020-08-13 DIAGNOSIS — R14.0 ABDOMINAL DISTENSION (GASEOUS): ICD-10-CM

## 2020-08-13 DIAGNOSIS — K59.04 CHRONIC IDIOPATHIC CONSTIPATION: ICD-10-CM

## 2020-08-13 PROCEDURE — 74178 CT ABD&PLV WO CNTR FLWD CNTR: CPT | Mod: TC

## 2020-08-13 PROCEDURE — 25500020 PHARM REV CODE 255: Performed by: INTERNAL MEDICINE

## 2020-08-13 PROCEDURE — 74178 CT ABD&PLV WO CNTR FLWD CNTR: CPT | Mod: 26,,, | Performed by: RADIOLOGY

## 2020-08-13 PROCEDURE — 74178 CT ABDOMEN PELVIS W WO CONTRAST: ICD-10-PCS | Mod: 26,,, | Performed by: RADIOLOGY

## 2020-08-13 RX ADMIN — IOHEXOL 100 ML: 350 INJECTION, SOLUTION INTRAVENOUS at 11:08

## 2020-08-13 RX ADMIN — IOHEXOL 1000 ML: 12 SOLUTION ORAL at 09:08

## 2020-08-19 ENCOUNTER — TELEPHONE (OUTPATIENT)
Dept: GASTROENTEROLOGY | Facility: CLINIC | Age: 65
End: 2020-08-19

## 2020-08-19 NOTE — TELEPHONE ENCOUNTER
----- Message from Pilar Montoya sent at 8/19/2020 10:08 AM CDT -----  Regarding: Returning the nurse call  Pt returning the nurse call. Pt can be reached [phone].    Thank you!

## 2020-10-01 ENCOUNTER — OFFICE VISIT (OUTPATIENT)
Dept: GASTROENTEROLOGY | Facility: CLINIC | Age: 65
End: 2020-10-01
Payer: MEDICARE

## 2020-10-01 VITALS — BODY MASS INDEX: 33.99 KG/M2 | WEIGHT: 229.5 LBS | HEIGHT: 69 IN

## 2020-10-01 DIAGNOSIS — K59.04 CHRONIC IDIOPATHIC CONSTIPATION: Primary | ICD-10-CM

## 2020-10-01 DIAGNOSIS — K75.81 NASH (NONALCOHOLIC STEATOHEPATITIS): ICD-10-CM

## 2020-10-01 PROCEDURE — 99999 PR PBB SHADOW E&M-EST. PATIENT-LVL III: ICD-10-PCS | Mod: PBBFAC,,, | Performed by: INTERNAL MEDICINE

## 2020-10-01 PROCEDURE — 99213 OFFICE O/P EST LOW 20 MIN: CPT | Mod: S$GLB,,, | Performed by: INTERNAL MEDICINE

## 2020-10-01 PROCEDURE — 99999 PR PBB SHADOW E&M-EST. PATIENT-LVL III: CPT | Mod: PBBFAC,,, | Performed by: INTERNAL MEDICINE

## 2020-10-01 PROCEDURE — 99213 PR OFFICE/OUTPT VISIT, EST, LEVL III, 20-29 MIN: ICD-10-PCS | Mod: S$GLB,,, | Performed by: INTERNAL MEDICINE

## 2020-10-01 RX ORDER — PREGABALIN 225 MG/1
225 CAPSULE ORAL 3 TIMES DAILY
COMMUNITY
End: 2021-07-09

## 2020-10-01 RX ORDER — ATORVASTATIN CALCIUM 40 MG/1
40 TABLET, FILM COATED ORAL DAILY
COMMUNITY
End: 2021-07-28 | Stop reason: SINTOL

## 2020-10-01 NOTE — PROGRESS NOTES
GASTROENTEROLOGY CLINIC NOTE    Reason for visit: The primary encounter diagnosis was Chronic idiopathic constipation. A diagnosis of LIND (nonalcoholic steatohepatitis) was also pertinent to this visit.  Referring provider/PCP: Bridgette Mcwilliams MD    HPI:  Medhat Nunez is a 65 y.o. male here today for follow up.    Interval:  Constipation beter. Not taking linzess. Eating healthier. Trying to exercise, but he has severe fatigue. Poor exercise tolerance.  No bloating. Feels SOB at times, feels dry mouth and dry throat    Reviewed last colonoscopy report received.  2017 [facility]  Diverticulosis sigmoid  5 year repeat.    ===============  Initial clinic visit.  He has had fairly longstanding issues of constipation but here over the past few months it has gotten much worse.  He will go over 4 days without having a bowel movement, and this is followed by some liquid stools.  He has associated abdominal bloating.  He mentions some right-sided hip pain that he is unsure if this is related or not.  He does not see any blood in his stool.  He feels full after eating.  He cannot tolerate a larger meal.  There is some abdominal pain associated over the bilateral lower quadrants.  It is not in a particular location at all times.  There is no inguinal pain or inguinal bulges.  Recently started linzess 145 and not making much difference.    No family hx crc    Prior Endoscopy:  EGD:  Colon:  2017 [facility]  Diverticulosis sigmoid  5 year repeat.    (Portions of this note were dictated using voice recognition software and may contain dictation related errors in spelling/grammar/syntax not found on text review)    Review of Systems   Constitutional: Positive for malaise/fatigue. Negative for fever and weight loss.   HENT: Negative for nosebleeds and sore throat.    Respiratory: Positive for shortness of breath. Negative for cough.    Gastrointestinal: Negative for abdominal pain, blood in stool, constipation and nausea.  "  Musculoskeletal: Negative for joint pain and neck pain.   Skin: Negative for itching and rash.   Psychiatric/Behavioral: Negative for hallucinations. The patient does not have insomnia.        Past Medical History: has no past medical history on file.    Past Surgical History: has a past surgical history that includes Hernia repair; Shoulder surgery (Right); and Cervical fusion.    Family History:family history is not on file.    Allergies: Review of patient's allergies indicates:  No Known Allergies    Social History: reports that he has never smoked. He does not have any smokeless tobacco history on file. He reports that he does not drink alcohol.    Home medications:   Current Outpatient Medications on File Prior to Visit   Medication Sig Dispense Refill    atorvastatin (LIPITOR) 40 MG tablet Take 40 mg by mouth once daily.      gabapentin (NEURONTIN) 100 MG capsule Take 100 mg by mouth 3 (three) times daily.      linaCLOtide (LINZESS) 145 mcg Cap capsule Take 145 mcg by mouth before breakfast.      lisinopriL (PRINIVIL,ZESTRIL) 40 MG tablet Take 40 mg by mouth once daily.      multivitamin capsule Take 1 capsule by mouth once daily.      pregabalin (LYRICA) 225 MG Cap Take 225 mg by mouth 3 (three) times daily.       No current facility-administered medications on file prior to visit.        Vital signs:  Ht 5' 9" (1.753 m)   Wt 104.1 kg (229 lb 8 oz)   BMI 33.89 kg/m²     Physical Exam  Vitals signs reviewed.   Constitutional:       Appearance: He is not toxic-appearing.   HENT:      Head: Normocephalic and atraumatic.   Eyes:      General: No scleral icterus.     Conjunctiva/sclera: Conjunctivae normal.   Neurological:      Mental Status: He is alert and oriented to person, place, and time.      Gait: Gait normal.   Psychiatric:         Mood and Affect: Mood normal.         Behavior: Behavior normal.         Routine labs:  Lab Results   Component Value Date    WBC 7.47 08/07/2020    HGB 15.5 " 08/07/2020    HCT 46.1 08/07/2020    MCV 89 08/07/2020     08/07/2020     No results found for: INR  No results found for: IRON, FERRITIN, TIBC, FESATURATED  Lab Results   Component Value Date     08/07/2020    K 4.5 08/07/2020     08/07/2020    CO2 23 08/07/2020    BUN 28 (H) 08/07/2020    CREATININE 1.1 08/07/2020     Lab Results   Component Value Date    ALBUMIN 4.4 08/07/2020    ALT 25 08/07/2020    AST 20 08/07/2020    ALKPHOS 72 08/07/2020    BILITOT 0.9 08/07/2020     No results found for: GLUCOSE    I have reviewed prior labs, imaging, notes from last month    Reviewed CT with him and gave him copy of report to show his PCP regarding follow up tiny pulm nodule.    Assessment:  1. Chronic idiopathic constipation    2. LIND (nonalcoholic steatohepatitis)      GI symptoms are improved.  Discussed LIND and wt loss      Plan:     Advised wt loss for LIND    Follow up with PCP    Recall placed for colonoscopy early 2022.      RTC prn      Oliverio Alan MD  [facility]

## 2020-10-15 ENCOUNTER — HOSPITAL ENCOUNTER (OUTPATIENT)
Dept: RADIOLOGY | Facility: HOSPITAL | Age: 65
Discharge: HOME | End: 2020-10-15
Attending: FAMILY MEDICINE
Payer: MEDICARE

## 2020-10-15 DIAGNOSIS — M25.559 HIP PAIN: ICD-10-CM

## 2020-10-15 PROCEDURE — 73522 X-RAY EXAM HIPS BI 3-4 VIEWS: CPT | Mod: 26,,, | Performed by: RADIOLOGY

## 2020-10-15 PROCEDURE — 73522 X-RAY EXAM HIPS BI 3-4 VIEWS: CPT | Mod: TC,FY

## 2020-10-15 PROCEDURE — 73522 XR HIP 3 OR 4 VIEWS BILATERAL: ICD-10-PCS | Mod: 26,,, | Performed by: RADIOLOGY

## 2021-01-10 ENCOUNTER — HOSPITAL ENCOUNTER (EMERGENCY)
Facility: HOSPITAL | Age: 66
Discharge: HOME | End: 2021-01-11
Attending: EMERGENCY MEDICINE
Payer: MEDICARE

## 2021-01-10 DIAGNOSIS — R25.1 TREMOR: Primary | ICD-10-CM

## 2021-01-10 PROCEDURE — 99283 EMERGENCY DEPT VISIT LOW MDM: CPT

## 2021-01-11 VITALS
WEIGHT: 229 LBS | HEIGHT: 69 IN | HEART RATE: 69 BPM | SYSTOLIC BLOOD PRESSURE: 132 MMHG | OXYGEN SATURATION: 98 % | DIASTOLIC BLOOD PRESSURE: 68 MMHG | RESPIRATION RATE: 20 BRPM | BODY MASS INDEX: 33.92 KG/M2 | TEMPERATURE: 98 F

## 2021-01-11 LAB
ALBUMIN SERPL BCP-MCNC: 3.7 G/DL (ref 3.5–5.2)
ALP SERPL-CCNC: 92 U/L (ref 55–135)
ALT SERPL W/O P-5'-P-CCNC: 38 U/L (ref 10–44)
ANION GAP SERPL CALC-SCNC: 9 MMOL/L (ref 8–16)
AST SERPL-CCNC: 21 U/L (ref 10–40)
BASOPHILS # BLD AUTO: 0.04 K/UL (ref 0–0.2)
BASOPHILS NFR BLD: 0.5 % (ref 0–1.9)
BILIRUB SERPL-MCNC: 0.3 MG/DL (ref 0.1–1)
BUN SERPL-MCNC: 27 MG/DL (ref 8–23)
CALCIUM SERPL-MCNC: 8.7 MG/DL (ref 8.7–10.5)
CHLORIDE SERPL-SCNC: 102 MMOL/L (ref 95–110)
CO2 SERPL-SCNC: 23 MMOL/L (ref 23–29)
CREAT SERPL-MCNC: 1.3 MG/DL (ref 0.5–1.4)
DIFFERENTIAL METHOD: ABNORMAL
EOSINOPHIL # BLD AUTO: 0.6 K/UL (ref 0–0.5)
EOSINOPHIL NFR BLD: 7.2 % (ref 0–8)
ERYTHROCYTE [DISTWIDTH] IN BLOOD BY AUTOMATED COUNT: 12.4 % (ref 11.5–14.5)
EST. GFR  (AFRICAN AMERICAN): >60 ML/MIN/1.73 M^2
EST. GFR  (NON AFRICAN AMERICAN): 58 ML/MIN/1.73 M^2
GLUCOSE SERPL-MCNC: 125 MG/DL (ref 70–110)
HCT VFR BLD AUTO: 41.7 % (ref 40–54)
HGB BLD-MCNC: 13.8 G/DL (ref 14–18)
IMM GRANULOCYTES # BLD AUTO: 0.04 K/UL (ref 0–0.04)
IMM GRANULOCYTES NFR BLD AUTO: 0.5 % (ref 0–0.5)
LYMPHOCYTES # BLD AUTO: 2.7 K/UL (ref 1–4.8)
LYMPHOCYTES NFR BLD: 33.7 % (ref 18–48)
MAGNESIUM SERPL-MCNC: 2 MG/DL (ref 1.6–2.6)
MCH RBC QN AUTO: 31 PG (ref 27–31)
MCHC RBC AUTO-ENTMCNC: 33.1 G/DL (ref 32–36)
MCV RBC AUTO: 94 FL (ref 82–98)
MONOCYTES # BLD AUTO: 0.7 K/UL (ref 0.3–1)
MONOCYTES NFR BLD: 8.5 % (ref 4–15)
NEUTROPHILS # BLD AUTO: 4 K/UL (ref 1.8–7.7)
NEUTROPHILS NFR BLD: 49.6 % (ref 38–73)
NRBC BLD-RTO: 0 /100 WBC
PLATELET # BLD AUTO: 198 K/UL (ref 150–350)
PMV BLD AUTO: 11.2 FL (ref 9.2–12.9)
POTASSIUM SERPL-SCNC: 5.4 MMOL/L (ref 3.5–5.1)
PROT SERPL-MCNC: 6.6 G/DL (ref 6–8.4)
RBC # BLD AUTO: 4.45 M/UL (ref 4.6–6.2)
SODIUM SERPL-SCNC: 134 MMOL/L (ref 136–145)
TSH SERPL DL<=0.005 MIU/L-ACNC: 1.07 UIU/ML (ref 0.4–4)
WBC # BLD AUTO: 8.11 K/UL (ref 3.9–12.7)

## 2021-01-11 PROCEDURE — 85025 COMPLETE CBC W/AUTO DIFF WBC: CPT

## 2021-01-11 PROCEDURE — 84443 ASSAY THYROID STIM HORMONE: CPT

## 2021-01-11 PROCEDURE — 80053 COMPREHEN METABOLIC PANEL: CPT

## 2021-01-11 PROCEDURE — 25000003 PHARM REV CODE 250: Performed by: EMERGENCY MEDICINE

## 2021-01-11 PROCEDURE — 83735 ASSAY OF MAGNESIUM: CPT

## 2021-01-11 RX ORDER — DIAZEPAM 5 MG/1
5 TABLET ORAL EVERY 6 HOURS PRN
Qty: 8 TABLET | Refills: 0 | Status: SHIPPED | OUTPATIENT
Start: 2021-01-11 | End: 2021-08-25

## 2021-01-11 RX ORDER — DIAZEPAM 5 MG/1
5 TABLET ORAL
Status: COMPLETED | OUTPATIENT
Start: 2021-01-11 | End: 2021-01-11

## 2021-01-11 RX ADMIN — DIAZEPAM 5 MG: 5 TABLET ORAL at 01:01

## 2021-06-01 ENCOUNTER — OFFICE VISIT (OUTPATIENT)
Dept: NEUROLOGY | Facility: CLINIC | Age: 66
End: 2021-06-01
Payer: MEDICARE

## 2021-06-01 VITALS
WEIGHT: 229 LBS | SYSTOLIC BLOOD PRESSURE: 110 MMHG | HEART RATE: 95 BPM | DIASTOLIC BLOOD PRESSURE: 73 MMHG | BODY MASS INDEX: 33.82 KG/M2

## 2021-06-01 DIAGNOSIS — F43.21 GRIEF: Primary | ICD-10-CM

## 2021-06-01 DIAGNOSIS — R41.89 SUBJECTIVE COGNITIVE IMPAIRMENT: ICD-10-CM

## 2021-06-01 DIAGNOSIS — G25.0 BENIGN ESSENTIAL TREMOR: ICD-10-CM

## 2021-06-01 PROCEDURE — 3008F BODY MASS INDEX DOCD: CPT | Mod: CPTII,S$GLB,, | Performed by: PSYCHIATRY & NEUROLOGY

## 2021-06-01 PROCEDURE — 99204 PR OFFICE/OUTPT VISIT, NEW, LEVL IV, 45-59 MIN: ICD-10-PCS | Mod: S$GLB,,, | Performed by: PSYCHIATRY & NEUROLOGY

## 2021-06-01 PROCEDURE — 1125F AMNT PAIN NOTED PAIN PRSNT: CPT | Mod: S$GLB,,, | Performed by: PSYCHIATRY & NEUROLOGY

## 2021-06-01 PROCEDURE — 99999 PR PBB SHADOW E&M-EST. PATIENT-LVL III: ICD-10-PCS | Mod: PBBFAC,,, | Performed by: PSYCHIATRY & NEUROLOGY

## 2021-06-01 PROCEDURE — 3008F PR BODY MASS INDEX (BMI) DOCUMENTED: ICD-10-PCS | Mod: CPTII,S$GLB,, | Performed by: PSYCHIATRY & NEUROLOGY

## 2021-06-01 PROCEDURE — 3288F PR FALLS RISK ASSESSMENT DOCUMENTED: ICD-10-PCS | Mod: CPTII,S$GLB,, | Performed by: PSYCHIATRY & NEUROLOGY

## 2021-06-01 PROCEDURE — 3288F FALL RISK ASSESSMENT DOCD: CPT | Mod: CPTII,S$GLB,, | Performed by: PSYCHIATRY & NEUROLOGY

## 2021-06-01 PROCEDURE — 1125F PR PAIN SEVERITY QUANTIFIED, PAIN PRESENT: ICD-10-PCS | Mod: S$GLB,,, | Performed by: PSYCHIATRY & NEUROLOGY

## 2021-06-01 PROCEDURE — 99204 OFFICE O/P NEW MOD 45 MIN: CPT | Mod: S$GLB,,, | Performed by: PSYCHIATRY & NEUROLOGY

## 2021-06-01 PROCEDURE — 1101F PR PT FALLS ASSESS DOC 0-1 FALLS W/OUT INJ PAST YR: ICD-10-PCS | Mod: CPTII,S$GLB,, | Performed by: PSYCHIATRY & NEUROLOGY

## 2021-06-01 PROCEDURE — 99999 PR PBB SHADOW E&M-EST. PATIENT-LVL III: CPT | Mod: PBBFAC,,, | Performed by: PSYCHIATRY & NEUROLOGY

## 2021-06-01 PROCEDURE — 1101F PT FALLS ASSESS-DOCD LE1/YR: CPT | Mod: CPTII,S$GLB,, | Performed by: PSYCHIATRY & NEUROLOGY

## 2021-06-01 RX ORDER — AMITRIPTYLINE HYDROCHLORIDE 50 MG/1
TABLET, FILM COATED ORAL
COMMUNITY
Start: 2021-05-26 | End: 2021-07-28

## 2021-06-25 ENCOUNTER — LAB VISIT (OUTPATIENT)
Dept: LAB | Facility: HOSPITAL | Age: 66
End: 2021-06-25
Attending: FAMILY MEDICINE
Payer: MEDICARE

## 2021-06-25 DIAGNOSIS — R30.0 DYSURIA: ICD-10-CM

## 2021-06-25 LAB
BACTERIA #/AREA URNS HPF: NORMAL /HPF
BILIRUB UR QL STRIP: NEGATIVE
CLARITY UR: CLEAR
COLOR UR: YELLOW
GLUCOSE UR QL STRIP: ABNORMAL
HGB UR QL STRIP: NEGATIVE
KETONES UR QL STRIP: NEGATIVE
LEUKOCYTE ESTERASE UR QL STRIP: NEGATIVE
MICROSCOPIC COMMENT: NORMAL
NITRITE UR QL STRIP: NEGATIVE
PH UR STRIP: 6 [PH] (ref 5–8)
PROT UR QL STRIP: NEGATIVE
SP GR UR STRIP: 1.01 (ref 1–1.03)
URN SPEC COLLECT METH UR: ABNORMAL
UROBILINOGEN UR STRIP-ACNC: NEGATIVE EU/DL
WBC #/AREA URNS HPF: 0 /HPF (ref 0–5)
YEAST URNS QL MICRO: NORMAL

## 2021-06-25 PROCEDURE — 81000 URINALYSIS NONAUTO W/SCOPE: CPT | Performed by: FAMILY MEDICINE

## 2021-07-09 PROBLEM — M25.69 DECREASED ROM OF TRUNK AND BACK: Status: RESOLVED | Noted: 2017-03-29 | Resolved: 2021-07-09

## 2021-07-09 PROBLEM — R29.898 DECREASED ROM OF NECK: Status: RESOLVED | Noted: 2017-03-29 | Resolved: 2021-07-09

## 2021-07-09 PROBLEM — I10 ESSENTIAL HYPERTENSION: Status: ACTIVE | Noted: 2021-07-09

## 2021-07-09 PROBLEM — M62.89 QUADRICEP TIGHTNESS: Status: RESOLVED | Noted: 2017-03-29 | Resolved: 2021-07-09

## 2021-07-09 PROBLEM — M62.9 HAMSTRING TIGHTNESS OF BOTH LOWER EXTREMITIES: Status: RESOLVED | Noted: 2017-03-29 | Resolved: 2021-07-09

## 2021-07-09 PROBLEM — R41.89 SUBJECTIVE COGNITIVE IMPAIRMENT: Status: RESOLVED | Noted: 2021-06-01 | Resolved: 2021-07-09

## 2021-07-13 ENCOUNTER — HOSPITAL ENCOUNTER (OUTPATIENT)
Dept: RADIOLOGY | Facility: HOSPITAL | Age: 66
Discharge: HOME | End: 2021-07-13
Attending: FAMILY MEDICINE
Payer: MEDICARE

## 2021-07-13 DIAGNOSIS — I73.9 CLAUDICATION: ICD-10-CM

## 2021-07-13 PROCEDURE — 93925 LOWER EXTREMITY STUDY: CPT | Mod: TC

## 2021-07-13 PROCEDURE — 72100 X-RAY EXAM L-S SPINE 2/3 VWS: CPT | Mod: TC,FY

## 2021-07-13 PROCEDURE — 93925 US LOWER EXTREMITY ARTERIES BILATERAL: ICD-10-PCS | Mod: 26,,, | Performed by: RADIOLOGY

## 2021-07-13 PROCEDURE — 93925 LOWER EXTREMITY STUDY: CPT | Mod: 26,,, | Performed by: RADIOLOGY

## 2021-07-13 PROCEDURE — 72100 X-RAY EXAM L-S SPINE 2/3 VWS: CPT | Mod: 26,,, | Performed by: RADIOLOGY

## 2021-07-13 PROCEDURE — 72100 XR LUMBAR SPINE AP AND LATERAL: ICD-10-PCS | Mod: 26,,, | Performed by: RADIOLOGY

## 2021-07-28 ENCOUNTER — LAB VISIT (OUTPATIENT)
Dept: LAB | Facility: HOSPITAL | Age: 66
End: 2021-07-28
Attending: FAMILY MEDICINE
Payer: MEDICARE

## 2021-07-28 DIAGNOSIS — M79.10 MYALGIA: ICD-10-CM

## 2021-07-28 LAB
CK SERPL-CCNC: 46 U/L (ref 20–200)
CRP SERPL-MCNC: 1.2 MG/L (ref 0–8.2)
ERYTHROCYTE [SEDIMENTATION RATE] IN BLOOD BY WESTERGREN METHOD: 25 MM/HR (ref 0–10)

## 2021-07-28 PROCEDURE — 85652 RBC SED RATE AUTOMATED: CPT | Performed by: FAMILY MEDICINE

## 2021-07-28 PROCEDURE — 86140 C-REACTIVE PROTEIN: CPT | Performed by: FAMILY MEDICINE

## 2021-07-28 PROCEDURE — 82550 ASSAY OF CK (CPK): CPT | Performed by: FAMILY MEDICINE

## 2021-07-28 PROCEDURE — 36415 COLL VENOUS BLD VENIPUNCTURE: CPT | Performed by: FAMILY MEDICINE

## 2021-12-28 NOTE — PROGRESS NOTES
TIME RECORD    Date:  04/10/2018    Start Time:  9:55 AM  Stop Time:  10:10 AM    PROCEDURES:    TIMED  Procedure Min.   Manual therapy 15               Total Timed Minutes:  15  Total Timed Units:  1  Total Untimed Units:  0  Charges Billed/# of units:  1 (MT=1)      Progress/Current Status    Subjective:     Patient ID: Medhat Nunez is a 63 y.o. male.  Diagnosis:   1. Lumbar pain     2. Chronic pain of both knees     3. Poor posture       Pain: Patient reports that FDN has helped decrease the symptoms in his back.    Objective:     Patient gave verbal consent to FDN today. Provided FDN to B L2 and B L3 multifidus coupled with estim for multiple bouts. Also provided FDN to L erector spinae/QL with good muscle twitching noted.    Assessment:     Patient tolerated FDN well. Patient demonstrated good muscle twitching when coupled with estim.    Patient Education/Response:     Continue current HEP.    Plans and Goals:     Continue PT POC.     Infliximab Counseling:  I discussed with the patient the risks of infliximab including but not limited to myelosuppression, immunosuppression, autoimmune hepatitis, demyelinating diseases, lymphoma, and serious infections.  The patient understands that monitoring is required including a PPD at baseline and must alert us or the primary physician if symptoms of infection or other concerning signs are noted.

## 2022-01-09 PROBLEM — N18.31 CHRONIC KIDNEY DISEASE, STAGE 3A: Status: ACTIVE | Noted: 2022-01-09

## 2022-01-11 ENCOUNTER — TELEPHONE (OUTPATIENT)
Dept: ADMINISTRATIVE | Facility: OTHER | Age: 67
End: 2022-01-11
Payer: MEDICARE

## 2022-01-20 ENCOUNTER — OFFICE VISIT (OUTPATIENT)
Dept: PAIN MEDICINE | Facility: CLINIC | Age: 67
End: 2022-01-20
Payer: MEDICARE

## 2022-01-20 VITALS — DIASTOLIC BLOOD PRESSURE: 90 MMHG | HEART RATE: 87 BPM | SYSTOLIC BLOOD PRESSURE: 162 MMHG

## 2022-01-20 DIAGNOSIS — M54.16 LUMBAR RADICULOPATHY, CHRONIC: ICD-10-CM

## 2022-01-20 DIAGNOSIS — G89.29 CHRONIC BILATERAL LOW BACK PAIN WITH BILATERAL SCIATICA: ICD-10-CM

## 2022-01-20 DIAGNOSIS — M54.41 CHRONIC BILATERAL LOW BACK PAIN WITH BILATERAL SCIATICA: ICD-10-CM

## 2022-01-20 DIAGNOSIS — M48.07 SPINAL STENOSIS, LUMBOSACRAL REGION: ICD-10-CM

## 2022-01-20 DIAGNOSIS — M25.50 POLYARTHRALGIA: Primary | ICD-10-CM

## 2022-01-20 DIAGNOSIS — M47.816 LUMBAR SPONDYLOSIS: ICD-10-CM

## 2022-01-20 DIAGNOSIS — M54.42 CHRONIC BILATERAL LOW BACK PAIN WITH BILATERAL SCIATICA: ICD-10-CM

## 2022-01-20 PROCEDURE — 1125F PR PAIN SEVERITY QUANTIFIED, PAIN PRESENT: ICD-10-PCS | Mod: CPTII,S$GLB,, | Performed by: PHYSICAL MEDICINE & REHABILITATION

## 2022-01-20 PROCEDURE — 3080F DIAST BP >= 90 MM HG: CPT | Mod: CPTII,S$GLB,, | Performed by: PHYSICAL MEDICINE & REHABILITATION

## 2022-01-20 PROCEDURE — 1159F PR MEDICATION LIST DOCUMENTED IN MEDICAL RECORD: ICD-10-PCS | Mod: CPTII,S$GLB,, | Performed by: PHYSICAL MEDICINE & REHABILITATION

## 2022-01-20 PROCEDURE — 99999 PR PBB SHADOW E&M-EST. PATIENT-LVL IV: CPT | Mod: PBBFAC,,, | Performed by: PHYSICAL MEDICINE & REHABILITATION

## 2022-01-20 PROCEDURE — 99204 PR OFFICE/OUTPT VISIT, NEW, LEVL IV, 45-59 MIN: ICD-10-PCS | Mod: S$GLB,,, | Performed by: PHYSICAL MEDICINE & REHABILITATION

## 2022-01-20 PROCEDURE — 3077F PR MOST RECENT SYSTOLIC BLOOD PRESSURE >= 140 MM HG: ICD-10-PCS | Mod: CPTII,S$GLB,, | Performed by: PHYSICAL MEDICINE & REHABILITATION

## 2022-01-20 PROCEDURE — 99204 OFFICE O/P NEW MOD 45 MIN: CPT | Mod: S$GLB,,, | Performed by: PHYSICAL MEDICINE & REHABILITATION

## 2022-01-20 PROCEDURE — 3077F SYST BP >= 140 MM HG: CPT | Mod: CPTII,S$GLB,, | Performed by: PHYSICAL MEDICINE & REHABILITATION

## 2022-01-20 PROCEDURE — 3080F PR MOST RECENT DIASTOLIC BLOOD PRESSURE >= 90 MM HG: ICD-10-PCS | Mod: CPTII,S$GLB,, | Performed by: PHYSICAL MEDICINE & REHABILITATION

## 2022-01-20 PROCEDURE — 1160F RVW MEDS BY RX/DR IN RCRD: CPT | Mod: CPTII,S$GLB,, | Performed by: PHYSICAL MEDICINE & REHABILITATION

## 2022-01-20 PROCEDURE — 1125F AMNT PAIN NOTED PAIN PRSNT: CPT | Mod: CPTII,S$GLB,, | Performed by: PHYSICAL MEDICINE & REHABILITATION

## 2022-01-20 PROCEDURE — 1159F MED LIST DOCD IN RCRD: CPT | Mod: CPTII,S$GLB,, | Performed by: PHYSICAL MEDICINE & REHABILITATION

## 2022-01-20 PROCEDURE — 1160F PR REVIEW ALL MEDS BY PRESCRIBER/CLIN PHARMACIST DOCUMENTED: ICD-10-PCS | Mod: CPTII,S$GLB,, | Performed by: PHYSICAL MEDICINE & REHABILITATION

## 2022-01-20 PROCEDURE — 99999 PR PBB SHADOW E&M-EST. PATIENT-LVL IV: ICD-10-PCS | Mod: PBBFAC,,, | Performed by: PHYSICAL MEDICINE & REHABILITATION

## 2022-01-20 RX ORDER — DULOXETIN HYDROCHLORIDE 30 MG/1
30 CAPSULE, DELAYED RELEASE ORAL DAILY
Qty: 30 CAPSULE | Refills: 11 | Status: SHIPPED | OUTPATIENT
Start: 2022-01-20 | End: 2022-03-07 | Stop reason: SDUPTHER

## 2022-01-20 NOTE — PROGRESS NOTES
[facility]  New Patient H&P    Referring Provider: Rodolfo Tse Md  [address]  [address]  [place] [address]    Chief Complaint:   Chief Complaint   Patient presents with    Knee Pain     right       History of Present Illness: Medhat Nunez is a 66 y.o. male referred by Rodolfo Tse MD for low back pain.      He notes pain all over his body, both hands, both shoulders, neck, low back, both flanks, both arms, both legs, both knees (right worse than left), both feet that started in 2017. He states he saw an outside rheumatologist, who diagnosed him with fibromyalgia.  He was started on with sounds like amitriptyline, but this causes bladder retention.    Back pain has been present since around 2017. Back pain is located diffusely in the low back and radiates into the bilateral groins and down the right posterior leg around the knee, especially when he walks. Pain is described as aching. The pain is aggravated by standing and walking. The pain is alleviated by laying down, sitting. He feels weakness in both arms and legs, especially with walking. He gets numbness in his legs at times. Denies changes in bowel or bladder function. Denies saddle anesthesia. Denies recent fevers or infections. Denies significant weight loss      Severity: Currently: 10/10   Typical Range: 5-10/10     Exacerbation: 10/10     Pain Disability Index  Family/Home Responsibilities:: 9  Recreation:: 10  Social Activity:: 7  Occupation:: 9  Sexual Behavior:: 9  Self Care:: 10  Life-Support Activities:: 3  Pain Disability Index (PDI): 57    Previous Interventions:  - Dr. Hernandez performed some injections in his back which helped    Previous Therapies:  PT/OT: yes   Relevant Surgery: yes    - Cervical fusion in 2013 at [facility]  Previous Medications:   - NSAIDS: diclofenac  - Muscle Relaxants: robaxin   - TCAs:  Elavil caused bladder retention  - SNRIs:   - Topicals:   - Anticonvulsants:    - Opioids:     Current  Pain Medications:  1. Methocarbamol  2. Diclofenac     Blood Thinners: None    Full Medication List:    Current Outpatient Medications:     diclofenac (VOLTAREN) 75 MG EC tablet, Take 75 mg by mouth daily as needed., Disp: , Rfl:     DULoxetine (CYMBALTA) 30 MG capsule, Take 1 capsule (30 mg total) by mouth once daily., Disp: 30 capsule, Rfl: 11    lisinopriL (PRINIVIL,ZESTRIL) 40 MG tablet, Take 1 tablet (40 mg total) by mouth once daily., Disp: 90 tablet, Rfl: 3    methocarbamoL (ROBAXIN) 750 MG Tab, Take 750 mg by mouth 2 (two) times daily as needed., Disp: , Rfl:     multivitamin capsule, Take 1 capsule by mouth once daily., Disp: , Rfl:      Review of Systems:  Review of Systems   Constitutional: Negative for fever and weight loss.   HENT: Negative for ear pain and tinnitus.    Eyes: Positive for pain. Negative for redness.   Respiratory: Negative for cough and shortness of breath.    Cardiovascular: Negative for chest pain and palpitations.   Gastrointestinal: Positive for abdominal pain and constipation. Negative for heartburn.   Genitourinary: Negative.    Musculoskeletal: Positive for back pain, joint pain, myalgias and neck pain.   Skin: Negative for itching and rash.   Neurological: Positive for dizziness, tingling and weakness. Negative for seizures.   Endo/Heme/Allergies: Negative for environmental allergies. Does not bruise/bleed easily.   Psychiatric/Behavioral: Positive for depression. The patient is nervous/anxious and has insomnia.        Allergies:  Patient has no known allergies.     Medical History:   has a past medical history of Hypertension (7/9/2021).    Surgical History:   has a past surgical history that includes Hernia repair; Shoulder surgery (Right); and Cervical fusion.    Family History:  family history is not on file.    Social History:   reports that he has never smoked. He has never used smokeless tobacco. He reports that he does not drink alcohol.    Physical Exam:  BP (!)  162/90   Pulse 87   GEN: No acute distress. Calm, comfortable  HENT: Normocephalic, atraumatic, moist mucous membranes  EYE: Anicteric sclera, non-injected.   CV: Non-diaphoretic. Regular Rate. Radial Pulses 2+.  RESP: Breathing comfortably. Chest expansion symmetric.  EXT: No clubbing, cyanosis.   SKIN: Warm, & dry to palpation. No visible rashes or lesions of exposed skin.   PSYCH: Pleasant mood and appropriate affect. Recent and remote memory intact.   GAIT: Independent, normal ambulation  MSK:  No diffuse tenderness to palpation over muscular regions, but most pain over joints of hand and fingers and elbows.  Lumbar Spine Exam:       Inspection: No erythema, bruising.       Palpation: (+) TTP of lumbar paraspinals on the left and left SIJ       ROM:  Limited in flexion, extension, lateral bending.       (+) Facet loading bilaterally      (-) Straight Leg Raise bilaterally      (+) ISAIAS bilaterally  Hip Exam:      Inspection: No gross deformity or apparent leg length discrepancy      Palpation: No TTP to bilateral greater trochanteric bursas.       ROM: Limitation in internal rotation bilaterally, but no pain  Neurologic Exam:     Alert. Speech is fluent and appropriate.     Strength:  5/5 throughout bilateral lower extremities     Sensation:  Grossly intact to light touch in bilateral lower extremities     Reflexes: 2+ in left and 1+ in right patella, and 1+ in b/l achilles     Tone: No abnormality appreciated in bilateral lower extremities     No Clonus     (-) Talbot bilaterally                Imaging:  - X-Ray Lumbar Spine Ap And Lateral 07/16/2021  Impression:  Mild spondylosis of the lumbar spine.    Labs:  BMP  Lab Results   Component Value Date     06/25/2021    K 4.9 06/25/2021     06/25/2021    CO2 25 06/25/2021    BUN 19 06/25/2021    CREATININE 1.4 06/25/2021    CALCIUM 9.6 06/25/2021    ANIONGAP 12 06/25/2021    ESTGFRAFRICA >60 06/25/2021    EGFRNONAA 52 (A) 06/25/2021     Lab Results    Component Value Date    ALT 38 01/11/2021    AST 21 01/11/2021    ALKPHOS 92 01/11/2021    BILITOT 0.3 01/11/2021     Lab Results   Component Value Date     01/11/2021     Lab Results   Component Value Date    CRP 1.2 07/28/2021     Lab Results   Component Value Date    SEDRATE 25 (H) 07/28/2021       Assessment:  Medhat Nunez is a 66 y.o. male with the following diagnoses based on history, exam, and imaging:    Problem List Items Addressed This Visit    None     Visit Diagnoses     Polyarthralgia    -  Primary    Relevant Medications    DULoxetine (CYMBALTA) 30 MG capsule    Other Relevant Orders    Ambulatory referral/consult to Rheumatology    Lumbar spondylosis        Relevant Medications    DULoxetine (CYMBALTA) 30 MG capsule    Other Relevant Orders    Ambulatory referral/consult to Physical/Occupational Therapy    Lumbar radiculopathy, chronic        Relevant Medications    DULoxetine (CYMBALTA) 30 MG capsule    Other Relevant Orders    MRI Lumbar Spine Without Contrast    Spinal stenosis, lumbosacral region        Relevant Medications    DULoxetine (CYMBALTA) 30 MG capsule    Other Relevant Orders    MRI Lumbar Spine Without Contrast    Ambulatory referral/consult to Physical/Occupational Therapy    Chronic bilateral low back pain with bilateral sciatica        Relevant Medications    DULoxetine (CYMBALTA) 30 MG capsule    Other Relevant Orders    Ambulatory referral/consult to Physical/Occupational Therapy          This is a pleasant 66 y.o. gentleman presenting with:     - Chronic bilateral low back pain with right radicular pain and bilateral claudication symptoms:  Minimal findings on lumbar x-ray, but patient appears to right radicular pain and bilateral claudication symptoms which appears consistent with lumbar spinal stenosis.  - polyarthralgia/chronic widespread pain:  He was previously diagnosed with fibromyalgia.  I am not sure that is the correct diagnosis at this time, but more likely  represents diffuse osteoarthritis, or potentially rheumatoid arthritis. Inflammatory markers not significant previously  - Comorbidities: CKD. Depression and Anxiety on ROS.     Treatment Plan:   - PT/OT/HEP: Refer to PT. Discussed benefits of exercise for pain.   - Procedures: Consider SERA in future  - Medications: Start cymbalta 30 mg daily. Plan to increase to 60 mg in one month if tolerated and not getting adequate benefit.   - Imaging: Reviewed. Order lumbar MRI  - Labs: Reviewed.  Medications are appropriately dosed for current hepatorenal function.  - Consult Rheumatology    Follow Up: RTC in 1 month    Colette Malcolm M.D.  Interventional Pain Medicine / Physical Medicine & Rehabilitation    Disclaimer: This note was partly generated using dictation software which may occasionally result in transcription errors.

## 2022-01-27 ENCOUNTER — CLINICAL SUPPORT (OUTPATIENT)
Dept: REHABILITATION | Facility: HOSPITAL | Age: 67
End: 2022-01-27
Attending: PHYSICAL MEDICINE & REHABILITATION
Payer: MEDICARE

## 2022-01-27 DIAGNOSIS — M75.20 BICEPS TENDINITIS, UNSPECIFIED LATERALITY: ICD-10-CM

## 2022-01-27 DIAGNOSIS — M54.42 CHRONIC BILATERAL LOW BACK PAIN WITH BILATERAL SCIATICA: ICD-10-CM

## 2022-01-27 DIAGNOSIS — M54.41 CHRONIC BILATERAL LOW BACK PAIN WITH BILATERAL SCIATICA: ICD-10-CM

## 2022-01-27 DIAGNOSIS — M48.07 SPINAL STENOSIS, LUMBOSACRAL REGION: ICD-10-CM

## 2022-01-27 DIAGNOSIS — R29.3 POOR POSTURE: ICD-10-CM

## 2022-01-27 DIAGNOSIS — M25.619 DECREASED RANGE OF MOTION OF SHOULDER, UNSPECIFIED LATERALITY: ICD-10-CM

## 2022-01-27 DIAGNOSIS — M47.816 LUMBAR SPONDYLOSIS: ICD-10-CM

## 2022-01-27 DIAGNOSIS — G89.29 CHRONIC BILATERAL LOW BACK PAIN WITH BILATERAL SCIATICA: ICD-10-CM

## 2022-01-27 PROCEDURE — 97110 THERAPEUTIC EXERCISES: CPT | Mod: PN

## 2022-01-27 PROCEDURE — 97162 PT EVAL MOD COMPLEX 30 MIN: CPT | Mod: PN

## 2022-01-27 NOTE — PLAN OF CARE
[facility]  Physical Therapy Initial Evaluation/treatment    Date: 1/27/2022   Name: Medhat Nunez  Clinic Number: [id number]    Therapy Diagnosis:   Encounter Diagnoses   Name Primary?    Lumbar spondylosis     Spinal stenosis, lumbosacral region     Chronic bilateral low back pain with bilateral sciatica     Decreased range of motion of shoulder, unspecified laterality     Biceps tendinitis, unspecified laterality     Poor posture      Physician: Colette Malcolm MD    Physician Orders: PT EVAL AND TREAT  Medical Diagnosis from Referral:  M47.816 (ICD-10-CM) - Lumbar spondylosis   M48.07 (ICD-10-CM) - Spinal stenosis, lumbosacral region   M54.42,M54.41,G89.29 (ICD-10-CM) - Chronic bilateral low back pain with bilateral sciatica     Evaluation Date: 1/27/2022  Authorization Period Expiration: 02/10/2022  Plan of Care Expiration: 3/27/21  Visit # / Visits authorized: 1/ 6    Time In: 10:10am  Time Out: 11:00  Total Appointment Time (timed & untimed codes): 50 minutes- 1 eval, 1 TE    Precautions: Standard    Subjective   Date of onset: 2017  History of current condition - Medhat reports: has pain in his whole body. Right knee bothers him mostly, difficulty giong to grocery store- wears a brace (which helps). Reports pain in L upper trapezius, ice helps, has trouble turning his neck toward left side. Endorses pain in both hands, can't close his hands at times. A medical doctor reported he may have fibromyalgia but another denied such. Got a shots in his back in the past, not having pain in his back lately because stretches (knee to chest in the AM). Sits on sofa daily, stopped exercising. Gets tired easily. Cuts grass recently about once per month due to season. No other responsibilities, helps wife work at times (throwing stuff away in dumpster, wife is a cleaning lady).  Endorses surgery on R RTC and biceps in 2013.     Numbness/tingling: Numbness only when using restroom, legs fall  asleep; numbness in hands (when supinates, goes away).  Falls: 2018 in park (uneven ground)  Exercise: no (not within last year)       Medical History:   Past Medical History:   Diagnosis Date    Hypertension 7/9/2021       Surgical History:   Medhat Nunez  has a past surgical history that includes Hernia repair; Shoulder surgery (Right); and Cervical fusion.    Medications:   Medhat has a current medication list which includes the following prescription(s): diclofenac, duloxetine, lisinopril, methocarbamol, and multivitamin.    Allergies:   Review of patient's allergies indicates:  No Known Allergies     Prior Therapy: *2017--same situation, did not help.  Social History: lives with wife, 4-5 steps, R handrails. At times, pain in R knee.  Occupation:  Retired-   Prior Level of Function: independent  Current Level of Function: Able to weed-eat, cut grass but increased pain afterward and increased time to perform. Feels he has to walk with increased lateral sway, pain in hips.    Neck/upper trap Pain:  Current 3/10, worst 10/10 (ice helps, pain medicine helps), best 3/10   Location: bilateral upper trapezius and neck    Description: digging  Aggravating Factors: turning head      Right knee:   Current 6/10, worst 6/10 (ice helps, pain medicine helps), best 1/10   Location: front and back of knee   Description: dull pain, digging  Aggravating Factors: none    Patients goals: To manage pain in R knee and L neck and B shoulders    Objective   Observation/posture: forward head, rounded shoulders  Palpation: tender to bilateral bicipital groove, bilateral acromion, supraspinatus, upper trapezius bilaterally.    Cervical Range of Motion:    Degrees Pain   Right Rotation 49 none     Left Rotation 49  Positive for pain       Cervical Special Tests: Not tested today    Active Range of Motion (degrees):   Shoulder Right Left   Flexion 153 133   Abduction 145 125     Functional Reach:  IR: bilateral T6  ER: Left L3 ,  Right T10    Upper Extremity Strength  (R) UE  (L) UE    Elbow flexion: 5/5 Elbow flexion: 5/5   Elbow extension: 5/5 Elbow extension: 5/5   Shoulder flexion: 5/5 ! Shoulder flexion: 5/5 !   Shoulder Abduction: 5/5 ! Shoulder abduction: 5/5 !   Shoulder ER 5/5 Shoulder ER 5/5   Shoulder IR 5/5 Shoulder IR 5/5   Supination:  5/5 (pain in RUE)  Pronation: 5/5 (pain in LUE, RUE)    Special Tests:  (+)= positive  (-)= negative    Subacromial impingement   Right Left   Neer - +   -Au-Fredy  -Painful arc ( degrees) +   -           Rotator Cuff:     Right Left   Empty Can Test (scaption, 90* abd, IR, overpressure) - -   Full Can Test (scaption, 90* abd, overpressure)    *If empty is +, full is -, non-related to RTC - -       AC Joint/Biceps:     Right Left   Speed's test (Also tests bursitis) + +        Right Left     Scapular assistance test (with abd) + +         Limitation/Restriction for FOTO Survey    Therapist reviewed FOTO scores for Medhat Nunez on 1/27/2022.   FOTO documents entered into [employer] - see Media section.    Limitation Score: 27%         TREATMENT   Treatment Time In: 10:50  Treatment Time Out: 11:00  Total Treatment time (time-based codes) separate from Evaluation: 10 minutes    Medhat received therapeutic exercises to develop strength, ROM and posture for 10 minutes including:  +Bruggers (no resistance) x 10 reps (seated)  +Seated upper trapezius/cervical side-bend stretch x 10'' hold, 3 trials  +Seated levator scapulae stretch x 10'' hold, 3 trials      Home Exercises and Patient Education Provided    Education provided:   - importance of home exercise program (try to perform every morning)  - PT diagnosis per observation, intake and objective measures  - importance of good posture daily    Written Home Exercises Provided: yes.  Exercises were reviewed and Medhat was able to demonstrate them prior to the end of the session.  Medhat demonstrated good  understanding of the education provided.      See EMR under Patient Instructions for exercises provided 1/27/2022.    Assessment   Medhat is a 66 y.o. male referred to outpatient Physical Therapy with a medical diagnosis of lumbar spondylosis, however pt presents to therapy for c/o left neck/shoulder and R knee pain which is constant and chronic since 2017. Patient presents with stiffness and/or decreased AROM in both shoulders (flexion/abduction), which causes pain. Presents with signs/symptoms consistent with biceps tendonitis due to overuse (pain at elbow with resisted supination, pain over bicipital groove, pain with shoulder flexion), periscapular weakness bilaterally and rounded shoulder posture causing impaired mechanics of the glenohumeral joint. Pt will benefit from therapy services to address these deficits and allow pt to manage pain independently. Plan to evaluate pt's right knee next session with physical therapist.    Patient prognosis is good/fair  Patientt will benefit from skilled outpatient Physical Therapy to address the deficits stated above and in the chart below, provide patient /family education, and to maximize patientt's level of independence.     Plan of care discussed with patient: yes  Patient's spiritual, cultural and educational needs considered and patient is agreeable to the plan of care and goals as stated below:     Anticipated Barriers for therapy: none    Medical Necessity is demonstrated by the following  History  Co-morbidities and personal factors that may impact the plan of care Co-morbidities:   advanced age, anxiety and immobility/sedentary    Personal Factors:   age  lifestyle     high   Examination  Body Structures and Functions, activity limitations and participation restrictions that may impact the plan of care Body Regions:   neck  lower extremities  upper extremities    Body Systems:    gross symmetry  ROM  strength  gait  motor control    Participation Restrictions:   Has stopped exercising since 2017, feels  limited with cutting grass and/or weed-eating.    Activity limitations:   Learning and applying knowledge  no deficits    General Tasks and Commands  no deficits    Communication  no deficits    Mobility  walking    Self care  no deficits    Domestic Life  no deficits    Interactions/Relationships  no deficits    Life Areas  no deficits    Community and Social Life  recreation and leisure         high   Clinical Presentation evolving clinical presentation with changing clinical characteristics moderate   Decision Making/ Complexity Score: moderate     Goals:  Cervical spine/shoulders Short-term- 4 weeks (2/27/22)  1. Pt will improve functional reach by 2 spinal segments with internal rotation bilaterally, denoting improved AROM.  2. Pt will improve functional reach by 2 spinal segments with external rotation, denoting improving AROM.  3. Pt will demonstrate his knowledge of and full compliance with home exercise program to allow home management of pain.  4. Pt will demonstrate improvement in rotation of neck by 5 degrees in each direction.  5. Pt will demonstrate improved shoulder AROM by >/= 5 degrees in flexion and abduction in both shoulders to denote improved flexibility.    Cervical spine/shoulders Long-term- 8 weeks (3/27/22)  1. Pt will report less pain with turning head side-to-side, improvement by >/= 75%.  2. Pt will present with pain-free overhead motion with flexion and/or abduction at >/= 150 degrees bilaterally.  3. Pt will denote less pain in bilateral shoulders, </= 2/10 consistently throughout one week.    To be determined: goals for R knee      Plan   Plan of care Certification: 1/27/2022 to 3/27/22.    -Plan: Encourage opening of chest (pec stretch, open books, etc), strengthen periscapular muscles (rows, prone/standing Y's, etc.) and around glenohumeral joint (IR/ER with resistance either side-lying or standing, etc.2), improve AROM of both shoulders (stretches via dowel flexion, wall slides,  cross-body stretch).  - PT: Will evaluate knees next session with PT on 2/11/22.       Outpatient Physical Therapy 2 times weekly for 12 weeks to include the following interventions: Manual Therapy, Moist Heat/ Ice, Neuromuscular Re-ed, Patient Education, Therapeutic Activites, and Therapeutic Exercise.     Ban Stern, PT, DPT  1/27/2022

## 2022-02-02 ENCOUNTER — HOSPITAL ENCOUNTER (OUTPATIENT)
Dept: RADIOLOGY | Facility: HOSPITAL | Age: 67
Discharge: HOME | End: 2022-02-02
Attending: PHYSICAL MEDICINE & REHABILITATION
Payer: MEDICARE

## 2022-02-02 DIAGNOSIS — M48.07 SPINAL STENOSIS, LUMBOSACRAL REGION: ICD-10-CM

## 2022-02-02 DIAGNOSIS — M54.16 LUMBAR RADICULOPATHY, CHRONIC: ICD-10-CM

## 2022-02-02 PROCEDURE — 72148 MRI LUMBAR SPINE W/O DYE: CPT | Mod: TC

## 2022-02-02 PROCEDURE — 72148 MRI LUMBAR SPINE W/O DYE: CPT | Mod: 26,,, | Performed by: STUDENT IN AN ORGANIZED HEALTH CARE EDUCATION/TRAINING PROGRAM

## 2022-02-02 PROCEDURE — 72148 MRI LUMBAR SPINE WITHOUT CONTRAST: ICD-10-PCS | Mod: 26,,, | Performed by: STUDENT IN AN ORGANIZED HEALTH CARE EDUCATION/TRAINING PROGRAM

## 2022-02-08 ENCOUNTER — CLINICAL SUPPORT (OUTPATIENT)
Dept: REHABILITATION | Facility: HOSPITAL | Age: 67
End: 2022-02-08
Payer: MEDICARE

## 2022-02-08 DIAGNOSIS — M25.619 DECREASED RANGE OF MOTION OF SHOULDER, UNSPECIFIED LATERALITY: ICD-10-CM

## 2022-02-08 DIAGNOSIS — R29.3 POOR POSTURE: ICD-10-CM

## 2022-02-08 DIAGNOSIS — M75.20 BICEPS TENDINITIS, UNSPECIFIED LATERALITY: ICD-10-CM

## 2022-02-08 PROCEDURE — 97110 THERAPEUTIC EXERCISES: CPT | Mod: PN,CQ

## 2022-02-08 NOTE — PROGRESS NOTES
[facility]   Physical Therapy Treatment Note     Name: Medhat Nunez  Clinic Number: [id number]    Therapy Diagnosis:   Encounter Diagnoses   Name Primary?    Decreased range of motion of shoulder, unspecified laterality     Biceps tendinitis, unspecified laterality     Poor posture      Physician: Colette Malcolm MD    Visit Date: 2/8/2022    Physician Orders: PT EVAL AND TREAT  Medical Diagnosis from Referral:  M47.816 (ICD-10-CM) - Lumbar spondylosis   M48.07 (ICD-10-CM) - Spinal stenosis, lumbosacral region   M54.42,M54.41,G89.29 (ICD-10-CM) - Chronic bilateral low back pain with bilateral sciatica      Evaluation Date: 1/27/2022  Authorization Period Expiration: 02/10/2022  Plan of Care Expiration: 3/27/21  Visit # / Visits authorized: 2/ 6  PTA Visit #: 1/5     Time In: 08:53  Time Out: 09:45  Total Billable Time: 45 minutes    SUBJECTIVE     Pt reports: he could barely walk because of R knee pain this weekend, a little better today. Reports pain behind R knee, unable to describe pain  He was compliant with home exercise program.  Response to previous treatment: increased neck pain  Functional change: none at this time    Neck/upper trap Pain:  Current 3/10, worst 10/10 (ice helps, pain medicine helps), best 3/10   Location: bilateral upper trapezius and neck    Description: digging  Aggravating Factors: turning head        Right knee:   Current 6/10, worst 6/10 (ice helps, pain medicine helps), best 1/10   Location: front and back of knee   Description: dull pain, digging  Aggravating Factors: none     Patients goals: To manage pain in R knee and L neck and B shoulders      OBJECTIVE     Objective Measures updated at progress report unless specified.     Treatment     Medhat received the treatments listed below:      Medhat received therapeutic exercises to develop strength, ROM and posture for 45 minutes including:    Supine cervical rotations  2x10  Supine cervical sidebending  " 2x10  Chin tucks 10x5"  Seated B shoulder ER 2x10  Standing wall slide x10 ea    Supine HS stretch 2x30"  Supine calf stretch 2x30"  SLR 2x10  Bridges 2x10  Side lying clams 2x10    Seated upper trapezius/cervical side-bend stretch x 10'' hold, 3 trials NP  Seated levator scapulae stretch x 10'' hold, 3 trials NP        Patient Education and Home Exercises       Home Exercises Provided and Patient Education Provided     Education provided:   - consistency and technique with HEP    Written Home Exercises Provided: Patient instructed to cont prior HEP. Exercises were reviewed and Medhat was able to demonstrate them prior to the end of the session.  Medhat demonstrated good  understanding of the education provided. See EMR under Patient Instructions for exercises provided during therapy sessions    ASSESSMENT     Medhat is a 66 y.o. male referred to outpatient Physical Therapy with a medical diagnosis of lumbar spondylosis, however pt presents to therapy for c/o left neck/shoulder and R knee pain which is constant and chronic since 2017. Medhat tolerated all progressed treatment well with no adverse effects. Limited mobility into cervical rotation and side bending noted with reports of pain on the right of the cervical spine with mobility into both directions. Extensive education to patient concerning consistency and correct technique with HEP for improved mobility.      Medhat is progressing well towards his goals.   Pt prognosis is Good/Fair.     Pt will continue to benefit from skilled outpatient physical therapy to address the deficits listed in the problem list box on initial evaluation, provide pt/family education and to maximize pt's level of independence in the home and community environment.     Pt's spiritual, cultural and educational needs considered and pt agreeable to plan of care and goals.     Anticipated barriers to physical therapy: none    Goals:   Cervical spine/shoulders Short-term- 4 weeks (2/27/22)  1. Pt " will improve functional reach by 2 spinal segments with internal rotation bilaterally, denoting improved AROM.  2. Pt will improve functional reach by 2 spinal segments with external rotation, denoting improving AROM.  3. Pt will demonstrate his knowledge of and full compliance with home exercise program to allow home management of pain.  4. Pt will demonstrate improvement in rotation of neck by 5 degrees in each direction.  5. Pt will demonstrate improved shoulder AROM by >/= 5 degrees in flexion and abduction in both shoulders to denote improved flexibility.     Cervical spine/shoulders Long-term- 8 weeks (3/27/22)  1. Pt will report less pain with turning head side-to-side, improvement by >/= 75%.  2. Pt will present with pain-free overhead motion with flexion and/or abduction at >/= 150 degrees bilaterally.  3. Pt will denote less pain in bilateral shoulders, </= 2/10 consistently throughout one week.     To be determined: goals for R knee      PLAN     Plan of care Certification: 1/27/2022 to 3/27/22.     -Plan: Encourage opening of chest (pec stretch, open books, etc), strengthen periscapular muscles (rows, prone/standing Y's, etc.) and around glenohumeral joint (IR/ER with resistance either side-lying or standing, etc.2), improve AROM of both shoulders (stretches via dowel flexion, wall slides, cross-body stretch).  - PT: Will evaluate knees next session with PT on 2/11/22.         Outpatient Physical Therapy 2 times weekly for 12 weeks to include the following interventions: Manual Therapy, Moist Heat/ Ice, Neuromuscular Re-ed, Patient Education, Therapeutic Activites, and Therapeutic Exercise.       KELLY MARTÍNEZ, PTA

## 2022-02-10 NOTE — PROGRESS NOTES
" [facility]   Physical Therapy Treatment Note     Name: Medhat Nunez  [facility] Number: [id number]    Therapy Diagnosis:   Encounter Diagnoses   Name Primary?    Decreased range of motion of shoulder, unspecified laterality     Biceps tendinitis, unspecified laterality     Poor posture      Physician: Colette Malcolm MD    Visit Date: 2/11/2022    Physician Orders: PT EVAL AND TREAT  Medical Diagnosis from Referral:  M47.816 (ICD-10-CM) - Lumbar spondylosis   M48.07 (ICD-10-CM) - Spinal stenosis, lumbosacral region   M54.42,M54.41,G89.29 (ICD-10-CM) - Chronic bilateral low back pain with bilateral sciatica      Evaluation Date: 1/27/2022  Authorization Period Expiration: 02/10/2022  Plan of Care Expiration: 3/27/21  Visit # / Visits authorized: 3/ 6  PTA Visit #: 0/5     Time In: 10:00am  Time Out: 11:00  Total Billable Time: 60 minutes- 2 TE, 2 PPT    SUBJECTIVE     Pt reports: tries to do exercises every night. Reports pain coming and going and neck pain runs from neck to scapula bilaterally.     He was compliant with home exercise program.  Response to previous treatment: increased neck pain  Functional change: none at this time    Neck/upper trap Pain:  4/10  Location: bilateral upper trapezius and neck       Right knee*:   Current 4/10  After: 1/10  Location: back of knee        Patients goals: To manage pain in R knee and L neck and B shoulders  OBJECTIVE     Objective Measures updated at progress report unless specified.     Observation:    Gait: Right lower extremity toe-out noted, right lateral shift with slight trendelenburg gait. Noted to have genu varus at baseline in standing.    Squat: Increased anterior weight-shift, got "stuck" when in full squat position due to pain in RLE.     Palpation: no pain in posterior BLE      Range of Motion (deg):   Knee Right AROM/PROM Left AROM/PROM   Flexion 119 degrees    After exercises: 133 degrees 140 degrees   Extension -2 degrees -1 " "degrees       Lower Extremity Strength  Right LE  Left LE    Knee extension: 5/5 Knee extension: 5/5   Knee flexion: 5/5 Knee flexion: 5/5   Hip flexion: 4+/5 Hip flexion: 5/5   Hip extension:  5/5 Hip extension: 5/5   Hip abduction: 5/5 Hip abduction: 5/5   Hip adduction: 5/5 Hip adduction 5/5   Hip internal rotation 5/5 Hip internal rotation 5/5   Hip external rotation 5/5 Hip external rotation 5/5   Ankle dorsiflexion: 5/5 Ankle dorsiflexion: 5/5   Ankle plantarflexion: 5/5 Ankle plantarflexion: 5/5     Special Tests:   Right   Valgus Stress Test (MCL- ER of tibia, Abduction at 0, 30 degrees) +   Varus Stress test (LCL- ER +adduction) - (pain medially)   Lachman's test (ACL- ant/med laxity, 30 degrees of flexion, ER of tibia, translate tibia anteriorly) -   Posterior Drawer (PCL laxity) -   Anterior Drawer (ACL- ant/med laxity) -   Supine Jose Cruz's Test (IR and varus, ER and valgus, flexion to extension, meniscus) -, pain medially   Mello's compression test (ITB irritation, flexion to extension, femoral condyle) -   Thessaly's Test (standing w/ rotation -meniscus tear) + for pain (no clicking, popping, locking)   Patellar Grind Test +       Joint Mobility:    Patellar sup./inf: within normal limits (RLE)   Patellar med/lat: limited    Palpation:    Crepitus: none   Adductors: Pain in RLE        Treatment     Medhat received the treatments listed below:      Medhat received therapeutic exercises to develop strength, ROM and posture for 30 minutes including:    +Recumbent bike x 5 mins, no resistance  +Quad sets on yellow ball, towel roll under heel x 20 reps, 5'' hold  SLR 2x10  +Heel slides to regain knee AROM: 20 reps  +Prone rectus femoris stretch x 30'' hold (3 trials)  Side-lying clams 2x10      Not today:  Supine HS stretch 2x30"  Supine calf stretch 2x30"  Bridges 2x10  Supine cervical rotations  2x10  Supine cervical sidebending  2x10  Chin tucks 10x5"  Seated B shoulder ER 2x10  Standing wall slide x10 " ea  Seated upper trapezius/cervical side-bend stretch x 10'' hold, 3 trials NP  Seated levator scapulae stretch x 10'' hold, 3 trials NP        Patient Education and Home Exercises       Home Exercises Provided and Patient Education Provided     Education provided:   - consistency and technique with HEP  - Updated HEP 2/11/22 for knees      Written Home Exercises Provided: Patient instructed to cont prior HEP. Exercises were reviewed and Medhat was able to demonstrate them prior to the end of the session.  Medhat demonstrated good  understanding of the education provided. See EMR under Patient Instructions for exercises provided during therapy sessions    ASSESSMENT     Medhat is a 66 y.o. male referred to outpatient Physical Therapy with a medical diagnosis of lumbar spondylosis. However, pt's chief complaints are pain in R knee and bilateral cervical spine and/or trapezius. Pt's knee assessment today shows decreased R knee AROM, impaired functional strength of gluteal muscles with slight Trendelenburg gait (with R single leg stance), signs of R adductor muscles overuse, patellofemoral pain and/or poor patellar tracking and medial compartment of tibiofemoral joint arthritis. He demonstrated 10+ degrees more AROM in the R knee after performing the exercises noted above with less pain as well. He will thus benefit from continued PT services to eliminate pain and allow self-management of R knee strength and AROM.      Medhat is progressing well towards his goals.   Pt prognosis is Good/Fair.     Pt will continue to benefit from skilled outpatient physical therapy to address the deficits listed in the problem list box on initial evaluation, provide pt/family education and to maximize pt's level of independence in the home and community environment.     Pt's spiritual, cultural and educational needs considered and pt agreeable to plan of care and goals.     Anticipated barriers to physical therapy: none    Goals Short-term- 4  weeks (2/27/22):   Cervical spine/shoulders   1. Pt will improve functional reach by 2 spinal segments with internal rotation bilaterally, denoting improved AROM.  2. Pt will improve functional reach by 2 spinal segments with external rotation, denoting improving AROM.  3. Pt will demonstrate his knowledge of and full compliance with home exercise program to allow home management of pain.  4. Pt will demonstrate improvement in rotation of neck by 5 degrees in each direction.  5. Pt will demonstrate improved shoulder AROM by >/= 5 degrees in flexion and abduction in both shoulders to denote improved flexibility.    R knee:  1. Pt will be compliant with HEP to show investment in self-care and promote improvement of R knee AROM, decrease pain.  2. Pt will improve R knee AROM by 15 degrees to allow for better functional mobility.  3. Pt will be able to negotiate 8 steps with </= 2/10 pain level, denoting improvement in overall functional strength.       Long-term- 8 weeks (3/27/22)  Cervical spine/shoulders   1. Pt will report less pain with turning head side-to-side, improvement by >/= 75%.  2. Pt will present with pain-free overhead motion with flexion and/or abduction at >/= 150 degrees bilaterally.  3. Pt will denote less pain in bilateral shoulders, </= 2/10 consistently throughout one week.    R knee:  1. Pt will demonstrate >/= 20 degrees improvement in R knee AROM.  2. Pt will be able to ambulate >/= 10 mins with </= 1/10 pain level to denote improvement in R knee AROM and strength.  3. Pt will be able to negotiate 12 steps with </= 1/10 pain level to demonstrate full functional use of R knee.    PLAN     Plan of care Certification: 1/27/2022 to 3/27/22.     Plan to alternate between R knee and cervical spine/upper trapezius (each session will switch).   -For neck: Encourage opening of chest (pec stretch, open books, etc), strengthen periscapular muscles (rows, prone/standing Y's, etc.) and around glenohumeral  joint (IR/ER with resistance either side-lying or standing, etc.2), improve AROM of both shoulders (stretches via dowel flexion, wall slides, cross-body stretch).   -For R knee: Improve patellar tracking by strengthening quadriceps, improve AROM with stretches, repeated flexion via heel slides, improve standing balance (single leg stance and/or gluteal strength to decrease trendelenburg with R SLS)        Outpatient Physical Therapy 2 times weekly for 8 weeks to include the following interventions: Manual Therapy, Moist Heat/ Ice, Neuromuscular Re-ed, Patient Education, Therapeutic Activites, and Therapeutic Exercise.       Ban Stern, PT , DPT  2/11/2022

## 2022-02-11 ENCOUNTER — CLINICAL SUPPORT (OUTPATIENT)
Dept: REHABILITATION | Facility: HOSPITAL | Age: 67
End: 2022-02-11
Payer: MEDICARE

## 2022-02-11 DIAGNOSIS — M75.20 BICEPS TENDINITIS, UNSPECIFIED LATERALITY: ICD-10-CM

## 2022-02-11 DIAGNOSIS — R29.3 POOR POSTURE: ICD-10-CM

## 2022-02-11 DIAGNOSIS — M25.619 DECREASED RANGE OF MOTION OF SHOULDER, UNSPECIFIED LATERALITY: ICD-10-CM

## 2022-02-11 PROCEDURE — 97110 THERAPEUTIC EXERCISES: CPT | Mod: PN

## 2022-02-11 PROCEDURE — 97750 PHYSICAL PERFORMANCE TEST: CPT | Mod: PN

## 2022-02-14 ENCOUNTER — CLINICAL SUPPORT (OUTPATIENT)
Dept: REHABILITATION | Facility: HOSPITAL | Age: 67
End: 2022-02-14
Payer: MEDICARE

## 2022-02-14 DIAGNOSIS — M25.619 DECREASED RANGE OF MOTION OF SHOULDER, UNSPECIFIED LATERALITY: ICD-10-CM

## 2022-02-14 DIAGNOSIS — R29.3 POOR POSTURE: ICD-10-CM

## 2022-02-14 DIAGNOSIS — M75.20 BICEPS TENDINITIS, UNSPECIFIED LATERALITY: ICD-10-CM

## 2022-02-14 PROCEDURE — 97110 THERAPEUTIC EXERCISES: CPT | Mod: PN,CQ

## 2022-02-14 PROCEDURE — 97140 MANUAL THERAPY 1/> REGIONS: CPT | Mod: PN,CQ

## 2022-02-14 NOTE — PROGRESS NOTES
" [facility]   Physical Therapy Treatment Note     Name: Medhat Nunez  [facility] Number: [id number]    Therapy Diagnosis:   Encounter Diagnoses   Name Primary?    Decreased range of motion of shoulder, unspecified laterality     Biceps tendinitis, unspecified laterality     Poor posture      Physician: Colette Malcolm MD    Visit Date: 2/14/2022    Physician Orders: PT EVAL AND TREAT  Medical Diagnosis from Referral:  M47.816 (ICD-10-CM) - Lumbar spondylosis   M48.07 (ICD-10-CM) - Spinal stenosis, lumbosacral region   M54.42,M54.41,G89.29 (ICD-10-CM) - Chronic bilateral low back pain with bilateral sciatica      Evaluation Date: 1/27/2022  Authorization Period Expiration: 02/10/2022  Plan of Care Expiration: 3/27/21  Visit # / Visits authorized: 4/ 6  PTA Visit #: 1/5     Time In: 9:00am  Time Out: 9:45  Total Billable Time: 45 minutes- 2 TE,1 MT    SUBJECTIVE     Pt reports: tries to do exercises every night. Reports pain coming and going and neck pain runs from neck to scapula bilaterally.     He was compliant with home exercise program.  Response to previous treatment: increased neck pain  Functional change: none at this time    Neck/upper trap Pain:  4/10  Location: bilateral upper trapezius and neck       Right knee*:   Current 9/10  After: 1/10  Location: back of knee        Patients goals: To manage pain in R knee and L neck and B shoulders  OBJECTIVE     Objective Measures updated at progress report unless specified.     Observation:    Gait: Right lower extremity toe-out noted, right lateral shift with slight trendelenburg gait. Noted to have genu varus at baseline in standing.    Squat: Increased anterior weight-shift, got "stuck" when in full squat position due to pain in RLE.     Palpation: no pain in posterior BLE      Range of Motion (deg):   Knee Right AROM/PROM Left AROM/PROM   Flexion 119 degrees    After exercises: 133 degrees 140 degrees   Extension -2 degrees -1 " "degrees       Lower Extremity Strength  Right LE  Left LE    Knee extension: 5/5 Knee extension: 5/5   Knee flexion: 5/5 Knee flexion: 5/5   Hip flexion: 4+/5 Hip flexion: 5/5   Hip extension:  5/5 Hip extension: 5/5   Hip abduction: 5/5 Hip abduction: 5/5   Hip adduction: 5/5 Hip adduction 5/5   Hip internal rotation 5/5 Hip internal rotation 5/5   Hip external rotation 5/5 Hip external rotation 5/5   Ankle dorsiflexion: 5/5 Ankle dorsiflexion: 5/5   Ankle plantarflexion: 5/5 Ankle plantarflexion: 5/5     Special Tests:   Right   Valgus Stress Test (MCL- ER of tibia, Abduction at 0, 30 degrees) +   Varus Stress test (LCL- ER +adduction) - (pain medially)   Lachman's test (ACL- ant/med laxity, 30 degrees of flexion, ER of tibia, translate tibia anteriorly) -   Posterior Drawer (PCL laxity) -   Anterior Drawer (ACL- ant/med laxity) -   Supine Jose Cruz's Test (IR and varus, ER and valgus, flexion to extension, meniscus) -, pain medially   Mello's compression test (ITB irritation, flexion to extension, femoral condyle) -   Thessaly's Test (standing w/ rotation -meniscus tear) + for pain (no clicking, popping, locking)   Patellar Grind Test +       Joint Mobility:    Patellar sup./inf: within normal limits (RLE)   Patellar med/lat: limited    Palpation:    Crepitus: none   Adductors: Pain in RLE        Treatment     Medhat received the treatments listed below:      Medhat received therapeutic exercises to develop strength, ROM and posture for 35 minutes including:    Recumbent bike x 5 mins, no resistance  Quad sets on yellow ball, towel roll under heel x 20 reps, 5'' hold  SLR 2x10  Heel slides to regain knee AROM: 20 reps  Prone rectus femoris stretch x 30'' hold (3 trials)  Side-lying clams 2x10   Bridges 2x10  Supine cervical rotations  2x10  Supine cervical sidebending  2x10  Chin tucks 10x5"        Not today:  Supine HS stretch 2x30"  Supine calf stretch 2x30"  Seated B shoulder ER 2x10  Standing wall slide x10 " ea  Seated upper trapezius/cervical side-bend stretch x 10'' hold, 3 trials NP  Seated levator scapulae stretch x 10'' hold, 3 trials NP    Medhat received manual treatment to include joint mobilizations and manual stretching for 10 minutes:  TF jt mobs gr II/III  Manual stretch right knee flex    Patient Education and Home Exercises       Home Exercises Provided and Patient Education Provided     Education provided:   - consistency and technique with HEP  - Updated HEP 2/11/22 for knees      Written Home Exercises Provided: Patient instructed to cont prior HEP. Exercises were reviewed and Medhat was able to demonstrate them prior to the end of the session.  Medhat demonstrated good  understanding of the education provided. See EMR under Patient Instructions for exercises provided during therapy sessions    ASSESSMENT     Medhat is a 66 y.o. male referred to outpatient Physical Therapy with a medical diagnosis of lumbar spondylosis. However, pt's chief complaints are pain in R knee and bilateral cervical spine and/or trapezius. Patient arrived to PT today with reports of increased pain, identified outer head of the gastroc and limited R knee flexion due to reports of pain. Improved pain free mobility and ambulation post therex and manual treatment. Education to patient concerning consistency with HEP for control of symptoms.     Medhat is progressing well towards his goals.   Pt prognosis is Good/Fair.     Pt will continue to benefit from skilled outpatient physical therapy to address the deficits listed in the problem list box on initial evaluation, provide pt/family education and to maximize pt's level of independence in the home and community environment.     Pt's spiritual, cultural and educational needs considered and pt agreeable to plan of care and goals.     Anticipated barriers to physical therapy: none    Goals Short-term- 4 weeks (2/27/22):   Cervical spine/shoulders   1. Pt will improve functional reach by 2  spinal segments with internal rotation bilaterally, denoting improved AROM.  2. Pt will improve functional reach by 2 spinal segments with external rotation, denoting improving AROM.  3. Pt will demonstrate his knowledge of and full compliance with home exercise program to allow home management of pain.  4. Pt will demonstrate improvement in rotation of neck by 5 degrees in each direction.  5. Pt will demonstrate improved shoulder AROM by >/= 5 degrees in flexion and abduction in both shoulders to denote improved flexibility.    R knee:  1. Pt will be compliant with HEP to show investment in self-care and promote improvement of R knee AROM, decrease pain.  2. Pt will improve R knee AROM by 15 degrees to allow for better functional mobility.  3. Pt will be able to negotiate 8 steps with </= 2/10 pain level, denoting improvement in overall functional strength.       Long-term- 8 weeks (3/27/22)  Cervical spine/shoulders   1. Pt will report less pain with turning head side-to-side, improvement by >/= 75%.  2. Pt will present with pain-free overhead motion with flexion and/or abduction at >/= 150 degrees bilaterally.  3. Pt will denote less pain in bilateral shoulders, </= 2/10 consistently throughout one week.    R knee:  1. Pt will demonstrate >/= 20 degrees improvement in R knee AROM.  2. Pt will be able to ambulate >/= 10 mins with </= 1/10 pain level to denote improvement in R knee AROM and strength.  3. Pt will be able to negotiate 12 steps with </= 1/10 pain level to demonstrate full functional use of R knee.    PLAN     Plan of care Certification: 1/27/2022 to 3/27/22.     Plan to alternate between R knee and cervical spine/upper trapezius (each session will switch).   -For neck: Encourage opening of chest (pec stretch, open books, etc), strengthen periscapular muscles (rows, prone/standing Y's, etc.) and around glenohumeral joint (IR/ER with resistance either side-lying or standing, etc.2), improve AROM of both  shoulders (stretches via dowel flexion, wall slides, cross-body stretch).   -For R knee: Improve patellar tracking by strengthening quadriceps, improve AROM with stretches, repeated flexion via heel slides, improve standing balance (single leg stance and/or gluteal strength to decrease trendelenburg with R SLS)        Outpatient Physical Therapy 2 times weekly for 8 weeks to include the following interventions: Manual Therapy, Moist Heat/ Ice, Neuromuscular Re-ed, Patient Education, Therapeutic Activites, and Therapeutic Exercise.       KELLY MARTÍNEZ, PTA   2/14/2022

## 2022-02-16 ENCOUNTER — CLINICAL SUPPORT (OUTPATIENT)
Dept: REHABILITATION | Facility: HOSPITAL | Age: 67
End: 2022-02-16
Payer: MEDICARE

## 2022-02-16 PROCEDURE — 97140 MANUAL THERAPY 1/> REGIONS: CPT | Mod: PN,CQ

## 2022-02-16 PROCEDURE — 97110 THERAPEUTIC EXERCISES: CPT | Mod: PN,CQ

## 2022-02-16 NOTE — PROGRESS NOTES
" [facility]   Physical Therapy Treatment Note     Name: Medhat Nunez  Clinic Number: [id number]    Therapy Diagnosis:   No diagnosis found.  Physician: Colette Malcolm MD    Visit Date: 2/16/2022    Physician Orders: PT EVAL AND TREAT  Medical Diagnosis from Referral:  M47.816 (ICD-10-CM) - Lumbar spondylosis   M48.07 (ICD-10-CM) - Spinal stenosis, lumbosacral region   M54.42,M54.41,G89.29 (ICD-10-CM) - Chronic bilateral low back pain with bilateral sciatica      Evaluation Date: 1/27/2022  Authorization Period Expiration: 02/10/2022  Plan of Care Expiration: 3/27/21  Visit # / Visits authorized: 5/ 6  PTA Visit #: 2/5     Time In: 9:45am  Time Out: 10:30  Total Billable Time: 45 minutes- 2 TE,1 MT    SUBJECTIVE     Pt reports: does his exercises daily and feels good for 10-15' and then pain comes on     He was compliant with home exercise program.  Response to previous treatment: increased neck pain  Functional change: none at this time    Neck/upper trap Pain:  4/10  Location: bilateral upper trapezius and neck       Right knee*:   Current 9/10  After: 1/10  Location: back of knee        Patients goals: To manage pain in R knee and L neck and B shoulders  OBJECTIVE     Objective Measures updated at progress report unless specified.     Observation:    Gait: Right lower extremity toe-out noted, right lateral shift with slight trendelenburg gait. Noted to have genu varus at baseline in standing.    Squat: Increased anterior weight-shift, got "stuck" when in full squat position due to pain in RLE.     Palpation: no pain in posterior BLE      Range of Motion (deg):   Knee Right AROM/PROM Left AROM/PROM   Flexion 119 degrees    After exercises: 133 degrees 140 degrees   Extension -2 degrees -1 degrees       Lower Extremity Strength  Right LE  Left LE    Knee extension: 5/5 Knee extension: 5/5   Knee flexion: 5/5 Knee flexion: 5/5   Hip flexion: 4+/5 Hip flexion: 5/5   Hip extension:  5/5 " "Hip extension: 5/5   Hip abduction: 5/5 Hip abduction: 5/5   Hip adduction: 5/5 Hip adduction 5/5   Hip internal rotation 5/5 Hip internal rotation 5/5   Hip external rotation 5/5 Hip external rotation 5/5   Ankle dorsiflexion: 5/5 Ankle dorsiflexion: 5/5   Ankle plantarflexion: 5/5 Ankle plantarflexion: 5/5     Special Tests:   Right   Valgus Stress Test (MCL- ER of tibia, Abduction at 0, 30 degrees) +   Varus Stress test (LCL- ER +adduction) - (pain medially)   Lachman's test (ACL- ant/med laxity, 30 degrees of flexion, ER of tibia, translate tibia anteriorly) -   Posterior Drawer (PCL laxity) -   Anterior Drawer (ACL- ant/med laxity) -   Supine Jose Cruz's Test (IR and varus, ER and valgus, flexion to extension, meniscus) -, pain medially   Mello's compression test (ITB irritation, flexion to extension, femoral condyle) -   Thessaly's Test (standing w/ rotation -meniscus tear) + for pain (no clicking, popping, locking)   Patellar Grind Test +       Joint Mobility:    Patellar sup./inf: within normal limits (RLE)   Patellar med/lat: limited    Palpation:    Crepitus: none   Adductors: Pain in RLE        Treatment     Medhat received the treatments listed below:      Medhat received therapeutic exercises to develop strength, ROM and posture for 35 minutes including:    Recumbent bike x 5 mins, no resistance  SLR 2x10  Side-lying clams with YTB 2x10   Bridges with 3" hold 2x10  Chin tucks 10x5"  Seated no monies 2x10  Standing YTB rows/shoulder ext with chin tuck 2x10        Not today:  Quad sets on yellow ball, towel roll under heel x 20 reps, 5'' hold   Heel slides to regain knee AROM: 20 reps  Prone rectus femoris stretch x 30'' hold (3 trials)  Supine HS stretch 2x30"  Supine calf stretch 2x30"  Seated B shoulder ER 2x10  Standing wall slide x10 ea  Seated upper trapezius/cervical side-bend stretch x 10'' hold, 3 trials  Seated levator scapulae stretch x 10'' hold, 3 trials NPSupine cervical rotations 2x10  Supine " cervical sidebending  2x10      Medhat received manual treatment to include joint mobilizations and manual stretching for 10 minutes:  TF jt mobs gr II/III  Manual stretch right knee flex    Patient Education and Home Exercises       Home Exercises Provided and Patient Education Provided     Education provided:   - consistency and technique with HEP  - Updated HEP 2/11/22 for knees      Written Home Exercises Provided: Patient instructed to cont prior HEP. Exercises were reviewed and Medhat was able to demonstrate them prior to the end of the session.  Medhat demonstrated good  understanding of the education provided. See EMR under Patient Instructions for exercises provided during therapy sessions    ASSESSMENT     Medhat is a 66 y.o. male referred to outpatient Physical Therapy with a medical diagnosis of lumbar spondylosis. However, pt's chief complaints are pain in R knee and bilateral cervical spine and/or trapezius. Patient continues to tolerate treatment well with reports of relief of pain post treatment and reports it will come back on in about 15-20 minutes. Reports of right shoulder/bicep pain with no monies with modification made for no resistance. Plan to progress strength of the scapula and cervical spine per patient tolerance.  Medhat is progressing well towards his goals.   Pt prognosis is Good/Fair.     Pt will continue to benefit from skilled outpatient physical therapy to address the deficits listed in the problem list box on initial evaluation, provide pt/family education and to maximize pt's level of independence in the home and community environment.     Pt's spiritual, cultural and educational needs considered and pt agreeable to plan of care and goals.     Anticipated barriers to physical therapy: none    Goals Short-term- 4 weeks (2/27/22):   Cervical spine/shoulders   1. Pt will improve functional reach by 2 spinal segments with internal rotation bilaterally, denoting improved AROM.  2. Pt will  improve functional reach by 2 spinal segments with external rotation, denoting improving AROM.  3. Pt will demonstrate his knowledge of and full compliance with home exercise program to allow home management of pain.  4. Pt will demonstrate improvement in rotation of neck by 5 degrees in each direction.  5. Pt will demonstrate improved shoulder AROM by >/= 5 degrees in flexion and abduction in both shoulders to denote improved flexibility.    R knee:  1. Pt will be compliant with HEP to show investment in self-care and promote improvement of R knee AROM, decrease pain.  2. Pt will improve R knee AROM by 15 degrees to allow for better functional mobility.  3. Pt will be able to negotiate 8 steps with </= 2/10 pain level, denoting improvement in overall functional strength.       Long-term- 8 weeks (3/27/22)  Cervical spine/shoulders   1. Pt will report less pain with turning head side-to-side, improvement by >/= 75%.  2. Pt will present with pain-free overhead motion with flexion and/or abduction at >/= 150 degrees bilaterally.  3. Pt will denote less pain in bilateral shoulders, </= 2/10 consistently throughout one week.    R knee:  1. Pt will demonstrate >/= 20 degrees improvement in R knee AROM.  2. Pt will be able to ambulate >/= 10 mins with </= 1/10 pain level to denote improvement in R knee AROM and strength.  3. Pt will be able to negotiate 12 steps with </= 1/10 pain level to demonstrate full functional use of R knee.    PLAN     Plan of care Certification: 1/27/2022 to 3/27/22.     Plan to alternate between R knee and cervical spine/upper trapezius (each session will switch).   -For neck: Encourage opening of chest (pec stretch, open books, etc), strengthen periscapular muscles (rows, prone/standing Y's, etc.) and around glenohumeral joint (IR/ER with resistance either side-lying or standing, etc.2), improve AROM of both shoulders (stretches via dowel flexion, wall slides, cross-body stretch).   -For R  knee: Improve patellar tracking by strengthening quadriceps, improve AROM with stretches, repeated flexion via heel slides, improve standing balance (single leg stance and/or gluteal strength to decrease trendelenburg with R SLS)        Outpatient Physical Therapy 2 times weekly for 8 weeks to include the following interventions: Manual Therapy, Moist Heat/ Ice, Neuromuscular Re-ed, Patient Education, Therapeutic Activites, and Therapeutic Exercise.       KELLY MARTÍNEZ, PTA   2/16/2022

## 2022-02-16 NOTE — PROGRESS NOTES
Imaging reviewed.  There are some mild arthritic changes at L5-S1 which does slightly narrow the foramina. Consider right or bilateral L5 TF SERA if not improving with PT. Could also consider addressing facet arthropathy if no relief with SERA or PT, but considering leg symptoms, I would start with SERA.

## 2022-02-21 ENCOUNTER — CLINICAL SUPPORT (OUTPATIENT)
Dept: REHABILITATION | Facility: HOSPITAL | Age: 67
End: 2022-02-21
Payer: MEDICARE

## 2022-02-21 DIAGNOSIS — R29.3 POOR POSTURE: ICD-10-CM

## 2022-02-21 DIAGNOSIS — M75.20 BICEPS TENDINITIS, UNSPECIFIED LATERALITY: ICD-10-CM

## 2022-02-21 DIAGNOSIS — M25.619 DECREASED RANGE OF MOTION OF SHOULDER, UNSPECIFIED LATERALITY: Primary | ICD-10-CM

## 2022-02-21 PROCEDURE — 97110 THERAPEUTIC EXERCISES: CPT | Mod: PN,CQ

## 2022-02-21 NOTE — PROGRESS NOTES
"  [facility]   Physical Therapy Treatment Note     Name: Medhat Nunez  Clinic Number: [id number]    Therapy Diagnosis:   Encounter Diagnoses   Name Primary?    Decreased range of motion of shoulder, unspecified laterality Yes    Biceps tendinitis, unspecified laterality     Poor posture      Physician: Colette Malcolm MD    Visit Date: 2/21/2022    Physician Orders: PT EVAL AND TREAT  Medical Diagnosis from Referral:  M47.816 (ICD-10-CM) - Lumbar spondylosis   M48.07 (ICD-10-CM) - Spinal stenosis, lumbosacral region   M54.42,M54.41,G89.29 (ICD-10-CM) - Chronic bilateral low back pain with bilateral sciatica      Evaluation Date: 1/27/2022  Authorization Period Expiration: 02/10/2022  Plan of Care Expiration: 3/27/21  Visit # / Visits authorized: 6/ 6  PTA Visit #: 2/5     Time In: 11:15am  Time Out: 12:00PM  Total Billable Time: 45 minutes- 3 TE    SUBJECTIVE     Pt reports: Continues to note that therapy and HEP decrease his back sx temporarily but return soon after especially when knee is in prolonged flexed position.      He was compliant with home exercise program.  Response to previous treatment: increased neck pain  Functional change: none at this time    Neck/upper trap Pain:  4/10  Location: bilateral upper trapezius and neck       Right knee*:   Current 7/10  After: 1/10  Location: back of knee        Patients goals: To manage pain in R knee and L neck and B shoulders  OBJECTIVE     Objective Measures updated at progress report unless specified.     Treatment     Medhat received the treatments listed below:      Medhat received therapeutic exercises to develop strength, ROM and posture for 45 minutes including:    Recumbent bike x 5 mins, no resistance  SLR 3x10  Side-lying clams with YTB 3x10   Bridges with 3" hold 2x10  Chin tucks 10x5"  Seated no monies 2x10  Standing YTB rows/shoulder ext with chin tuck 2x10  Lunges at stairs x20        Not today:  Quad sets on yellow ball, " "towel roll under heel x 20 reps, 5'' hold   Heel slides to regain knee AROM: 20 reps  Prone rectus femoris stretch x 30'' hold (3 trials)  Supine HS stretch 2x30"  Supine calf stretch 2x30"  Seated B shoulder ER 2x10  Standing wall slide x10 ea  Seated upper trapezius/cervical side-bend stretch x 10'' hold, 3 trials  Seated levator scapulae stretch x 10'' hold, 3 trials NPSupine cervical rotations 2x10  Supine cervical sidebending  2x10      Medhat received manual treatment to include joint mobilizations and manual stretching for 00 minutes:  TF jt mobs gr II/III  Manual stretch right knee flex    Patient Education and Home Exercises       Home Exercises Provided and Patient Education Provided     Education provided:   - consistency and technique with HEP  - Updated HEP 2/11/22 for knees      Written Home Exercises Provided: Patient instructed to cont prior HEP. Exercises were reviewed and Medhat was able to demonstrate them prior to the end of the session.  Medhat demonstrated good  understanding of the education provided. See EMR under Patient Instructions for exercises provided during therapy sessions    ASSESSMENT     Medhat is a 66 y.o. male referred to outpatient Physical Therapy with a medical diagnosis of lumbar spondylosis. However, pt's chief complaints is pain in R knee. Bilateral cervical spine and/or trapezius pain has decreased and is well managed with HEP. Patient continues to tolerate treatment well with reports of relief of pain post treatment and reports it will come back on in about 15-20 minutes. Patient reports most knee pain with flexion and states he sits with his leg in extension for comfort. Implemented knee lunges on stairs to continue improving knee strength and ROM with good tolerance reporting decreased pain afterward. Plan to progress strength of the scapula and cervical spine per patient tolerance.  Medhat is progressing well towards his goals.   Pt prognosis is Good/Fair.     Pt will continue " to benefit from skilled outpatient physical therapy to address the deficits listed in the problem list box on initial evaluation, provide pt/family education and to maximize pt's level of independence in the home and community environment.     Pt's spiritual, cultural and educational needs considered and pt agreeable to plan of care and goals.     Anticipated barriers to physical therapy: none    Goals Short-term- 4 weeks (2/27/22):   Cervical spine/shoulders   1. Pt will improve functional reach by 2 spinal segments with internal rotation bilaterally, denoting improved AROM.  2. Pt will improve functional reach by 2 spinal segments with external rotation, denoting improving AROM.  3. Pt will demonstrate his knowledge of and full compliance with home exercise program to allow home management of pain.  4. Pt will demonstrate improvement in rotation of neck by 5 degrees in each direction.  5. Pt will demonstrate improved shoulder AROM by >/= 5 degrees in flexion and abduction in both shoulders to denote improved flexibility.    R knee:  1. Pt will be compliant with HEP to show investment in self-care and promote improvement of R knee AROM, decrease pain.  2. Pt will improve R knee AROM by 15 degrees to allow for better functional mobility.  3. Pt will be able to negotiate 8 steps with </= 2/10 pain level, denoting improvement in overall functional strength.       Long-term- 8 weeks (3/27/22)  Cervical spine/shoulders   1. Pt will report less pain with turning head side-to-side, improvement by >/= 75%.  2. Pt will present with pain-free overhead motion with flexion and/or abduction at >/= 150 degrees bilaterally.  3. Pt will denote less pain in bilateral shoulders, </= 2/10 consistently throughout one week.    R knee:  1. Pt will demonstrate >/= 20 degrees improvement in R knee AROM.  2. Pt will be able to ambulate >/= 10 mins with </= 1/10 pain level to denote improvement in R knee AROM and strength.  3. Pt will be  able to negotiate 12 steps with </= 1/10 pain level to demonstrate full functional use of R knee.    PLAN     Plan of care Certification: 1/27/2022 to 3/27/22.     Plan to alternate between R knee and cervical spine/upper trapezius (each session will switch).   -For neck: Encourage opening of chest (pec stretch, open books, etc), strengthen periscapular muscles (rows, prone/standing Y's, etc.) and around glenohumeral joint (IR/ER with resistance either side-lying or standing, etc.2), improve AROM of both shoulders (stretches via dowel flexion, wall slides, cross-body stretch).   -For R knee: Improve patellar tracking by strengthening quadriceps, improve AROM with stretches, repeated flexion via heel slides, improve standing balance (single leg stance and/or gluteal strength to decrease trendelenburg with R SLS)        Outpatient Physical Therapy 2 times weekly for 8 weeks to include the following interventions: Manual Therapy, Moist Heat/ Ice, Neuromuscular Re-ed, Patient Education, Therapeutic Activites, and Therapeutic Exercise.       Terra Ruff, PTA   2/21/2022

## 2022-02-22 ENCOUNTER — DOCUMENTATION ONLY (OUTPATIENT)
Dept: REHABILITATION | Facility: HOSPITAL | Age: 67
End: 2022-02-22
Payer: MEDICARE

## 2022-02-22 NOTE — PROGRESS NOTES
Face to face meeting completed with Ban Pandya PT regarding current status and progress of   Medhat Nunez .  Evens Hernandez, PTA

## 2022-02-25 ENCOUNTER — CLINICAL SUPPORT (OUTPATIENT)
Dept: REHABILITATION | Facility: HOSPITAL | Age: 67
End: 2022-02-25
Payer: MEDICARE

## 2022-02-25 DIAGNOSIS — M75.20 BICEPS TENDINITIS, UNSPECIFIED LATERALITY: ICD-10-CM

## 2022-02-25 DIAGNOSIS — R29.3 POOR POSTURE: ICD-10-CM

## 2022-02-25 DIAGNOSIS — M25.619 DECREASED RANGE OF MOTION OF SHOULDER, UNSPECIFIED LATERALITY: Primary | ICD-10-CM

## 2022-02-25 PROCEDURE — 97110 THERAPEUTIC EXERCISES: CPT | Mod: PN,CQ

## 2022-02-25 NOTE — PROGRESS NOTES
"  [facility]   Physical Therapy Treatment Note     Name: Medhat Nunez  Clinic Number: [id number]    Therapy Diagnosis:   Encounter Diagnoses   Name Primary?    Decreased range of motion of shoulder, unspecified laterality Yes    Biceps tendinitis, unspecified laterality     Poor posture      Physician: Colette Malcolm MD    Visit Date: 2/25/2022    Physician Orders: PT EVAL AND TREAT  Medical Diagnosis from Referral:  M47.816 (ICD-10-CM) - Lumbar spondylosis   M48.07 (ICD-10-CM) - Spinal stenosis, lumbosacral region   M54.42,M54.41,G89.29 (ICD-10-CM) - Chronic bilateral low back pain with bilateral sciatica      Evaluation Date: 1/27/2022  Authorization Period Expiration: 02/10/2022  Plan of Care Expiration: 3/27/21  Visit # / Visits authorized: 7/ 6  PTA Visit #: 3/5     Time In: 10:30am  Time Out: 11:15am  Total Billable Time: 45 minutes- 3 TE    SUBJECTIVE     Pt reports: he felt the pain got worse after he completes exercises, ~10' after. Overall feels better after treatments     He was compliant with home exercise program.  Response to previous treatment: increased neck pain  Functional change: none at this time    Neck/upper trap Pain:  3/10  Location: bilateral upper trapezius and neck       Right knee*:   Current 5/10  After: 1/10  Location: back of knee        Patients goals: To manage pain in R knee and L neck and B shoulders  OBJECTIVE     Objective Measures updated at progress report unless specified.     Treatment     Medhat received the treatments listed below:      Medhat received therapeutic exercises to develop strength, ROM and posture for 45 minutes including:    Recumbent bike x 5 mins, no resistance  SLR 3x10  Side-lying clams with RTB 3x10   Bridges with 3" hold 2x10  Chin tucks 10x5"  Seated no monies 2x10  Standing YTB rows/shoulder ext with chin tuck 2x10  Lunges at stairs x20  Supine R nerve glides 2x10  Supine piriformis str 3x30"        Not today:  Quad sets " "on yellow ball, towel roll under heel x 20 reps, 5'' hold   Heel slides to regain knee AROM: 20 reps  Prone rectus femoris stretch x 30'' hold (3 trials)  Supine HS stretch 2x30"  Supine calf stretch 2x30"  Seated B shoulder ER 2x10  Standing wall slide x10 ea  Seated upper trapezius/cervical side-bend stretch x 10'' hold, 3 trials  Seated levator scapulae stretch x 10'' hold, 3 trials NPSupine cervical rotations 2x10  Supine cervical sidebending  2x10      Medhat received manual treatment to include joint mobilizations and manual stretching for 00 minutes:  TF jt mobs gr II/III  Manual stretch right knee flex    Patient Education and Home Exercises       Home Exercises Provided and Patient Education Provided     Education provided:   - consistency and technique with HEP  - Updated HEP 2/25/22      Written Home Exercises Provided: yes. Exercises were reviewed and Medhat was able to demonstrate them prior to the end of the session.  Medhat demonstrated good  understanding of the education provided. See EMR under Patient Instructions for exercises provided during therapy sessions    ASSESSMENT     Medhat is a 66 y.o. male referred to outpatient Physical Therapy with a medical diagnosis of lumbar spondylosis. Patient presents to PT today with chief complaint of posterior R knee pain that is better after exercises and worsens in about 10' after exercises. Reproduction of pain reported with supine nerve glides into knee extension and ankle DF. Added supine nerve glides and supine piriformis stretch at today's visit with reports of decreased pain. Plan to assess tolerance to progressed HEP and progress per patient tolerance.  Medhat is progressing well towards his goals.   Pt prognosis is Good/Fair.     Pt will continue to benefit from skilled outpatient physical therapy to address the deficits listed in the problem list box on initial evaluation, provide pt/family education and to maximize pt's level of independence in the home " and community environment.     Pt's spiritual, cultural and educational needs considered and pt agreeable to plan of care and goals.     Anticipated barriers to physical therapy: none    Goals Short-term- 4 weeks (2/27/22):   Cervical spine/shoulders   1. Pt will improve functional reach by 2 spinal segments with internal rotation bilaterally, denoting improved AROM.  2. Pt will improve functional reach by 2 spinal segments with external rotation, denoting improving AROM.  3. Pt will demonstrate his knowledge of and full compliance with home exercise program to allow home management of pain.  4. Pt will demonstrate improvement in rotation of neck by 5 degrees in each direction.  5. Pt will demonstrate improved shoulder AROM by >/= 5 degrees in flexion and abduction in both shoulders to denote improved flexibility.    R knee:  1. Pt will be compliant with HEP to show investment in self-care and promote improvement of R knee AROM, decrease pain.  2. Pt will improve R knee AROM by 15 degrees to allow for better functional mobility.  3. Pt will be able to negotiate 8 steps with </= 2/10 pain level, denoting improvement in overall functional strength.       Long-term- 8 weeks (3/27/22)  Cervical spine/shoulders   1. Pt will report less pain with turning head side-to-side, improvement by >/= 75%.  2. Pt will present with pain-free overhead motion with flexion and/or abduction at >/= 150 degrees bilaterally.  3. Pt will denote less pain in bilateral shoulders, </= 2/10 consistently throughout one week.    R knee:  1. Pt will demonstrate >/= 20 degrees improvement in R knee AROM.  2. Pt will be able to ambulate >/= 10 mins with </= 1/10 pain level to denote improvement in R knee AROM and strength.  3. Pt will be able to negotiate 12 steps with </= 1/10 pain level to demonstrate full functional use of R knee.    PLAN     Plan of care Certification: 1/27/2022 to 3/27/22.     Plan to alternate between R knee and cervical  spine/upper trapezius (each session will switch).   -For neck: Encourage opening of chest (pec stretch, open books, etc), strengthen periscapular muscles (rows, prone/standing Y's, etc.) and around glenohumeral joint (IR/ER with resistance either side-lying or standing, etc.2), improve AROM of both shoulders (stretches via dowel flexion, wall slides, cross-body stretch).   -For R knee: Improve patellar tracking by strengthening quadriceps, improve AROM with stretches, repeated flexion via heel slides, improve standing balance (single leg stance and/or gluteal strength to decrease trendelenburg with R SLS)        Outpatient Physical Therapy 2 times weekly for 8 weeks to include the following interventions: Manual Therapy, Moist Heat/ Ice, Neuromuscular Re-ed, Patient Education, Therapeutic Activites, and Therapeutic Exercise.       KELLY MARTÍNEZ, PTA   2/25/2022

## 2022-03-02 ENCOUNTER — CLINICAL SUPPORT (OUTPATIENT)
Dept: REHABILITATION | Facility: HOSPITAL | Age: 67
End: 2022-03-02
Payer: MEDICARE

## 2022-03-02 DIAGNOSIS — R29.3 POOR POSTURE: ICD-10-CM

## 2022-03-02 DIAGNOSIS — M25.619 DECREASED RANGE OF MOTION OF SHOULDER, UNSPECIFIED LATERALITY: Primary | ICD-10-CM

## 2022-03-02 DIAGNOSIS — M75.20 BICEPS TENDINITIS, UNSPECIFIED LATERALITY: ICD-10-CM

## 2022-03-02 PROCEDURE — 97140 MANUAL THERAPY 1/> REGIONS: CPT | Mod: PN

## 2022-03-02 PROCEDURE — 97110 THERAPEUTIC EXERCISES: CPT | Mod: PN

## 2022-03-02 NOTE — PROGRESS NOTES
"  [facility]   Physical Therapy Treatment Note     Name: Medhat Nunez  Clinic Number: [id number]    Therapy Diagnosis:   Encounter Diagnoses   Name Primary?    Decreased range of motion of shoulder, unspecified laterality Yes    Biceps tendinitis, unspecified laterality     Poor posture      Physician: Colette Malcolm MD    Visit Date: 3/2/2022    Physician Orders: PT EVAL AND TREAT  Medical Diagnosis from Referral:  M47.816 (ICD-10-CM) - Lumbar spondylosis   M48.07 (ICD-10-CM) - Spinal stenosis, lumbosacral region   M54.42,M54.41,G89.29 (ICD-10-CM) - Chronic bilateral low back pain with bilateral sciatica      Evaluation Date: 1/27/2022  Authorization Period Expiration: 02/10/2022  Plan of Care Expiration: 3/27/21  Visit # / Visits authorized: 8/ 6  FOTO: next  PTA Visit #: 0/5    Time In: 1215  Time Out: 1315  Total Billable Time: 30 minutes- (1 TE, 1 MT)    SUBJECTIVE     Pt reports: injuring the right side of his neck about 1 hour ago when he turned his head to the left too quickly.      He was compliant with home exercise program.  Response to previous treatment: increased neck pain  Functional change: none at this time    Neck/upper trap Pain:  3/10  Location: bilateral upper trapezius and neck       Right knee*:   Current 2/10  After: 1/10  Location: back of knee     Patients goals: To manage pain in R knee and L neck and B shoulders    OBJECTIVE     Objective Measures updated at progress report unless specified.     Treatment     Medhat received the treatments listed below:      Medhat received therapeutic exercises to develop strength, ROM and posture for 15 minutes with PT 1:1 and 30 minutes of supervision including:  Recumbent bike x 5 mins, no resistance  SLR 3x10  Side-lying clams with RTB 3x10   Bridges with 3" hold 2x10  Leg press 3x10 double leg at 7 plates  Knee extension machine 3x10 at 15#    Chin tucks 10x5" --> 2 rounds  Seated no monies 2x10  Standing YTB " "rows/shoulder ext with chin tuck 2x10    Not today:  Lunges at stairs x20  Supine R nerve glides 2x10  Supine piriformis str 3x30"        Medhat received manual treatment to include joint mobilizations and manual stretching for 15 minutes:  - cervical upper ligamentous assessment  - cervical side glide assessment.   - suboccipital release and CFM to Semispinalis insertion onto occipital protuberence    Patient Education and Home Exercises     Home Exercises Provided and Patient Education Provided   Education provided:   - consistency and technique with HEP  - Updated HEP 2/25/22    Written Home Exercises Provided: yes. Exercises were reviewed and Medhat was able to demonstrate them prior to the end of the session.  Medhat demonstrated good  understanding of the education provided. See EMR under Patient Instructions for exercises provided during therapy sessions    ASSESSMENT     Medhat is a 66 y.o. male referred to outpatient Physical Therapy with a medical diagnosis of lumbar spondylosis. Patient presents to PT today with chief complaint of right-sided neck pain with both LR and RR. Voices acute onset of pain ~ 1 hour prior to session.  SUSAN: turning his head quickly to the left.  After manual today, reported rotatory symptoms; does voice a history of this symptom. (-) transverse ligament test prior to starting manual.  No VBI symptoms voiced while performing manual therapy.  Improved neck pain following session today.  Minimal knee pain upon arrival today.       Medhat is progressing well towards his goals.   Pt prognosis is Good/Fair.     Pt will continue to benefit from skilled outpatient physical therapy to address the deficits listed in the problem list box on initial evaluation, provide pt/family education and to maximize pt's level of independence in the home and community environment.   Pt's spiritual, cultural and educational needs considered and pt agreeable to plan of care and goals.     Anticipated barriers to " physical therapy: none    Goals Short-term- 4 weeks (2/27/22):   Cervical spine/shoulders   1. Pt will improve functional reach by 2 spinal segments with internal rotation bilaterally, denoting improved AROM.  2. Pt will improve functional reach by 2 spinal segments with external rotation, denoting improving AROM.  3. Pt will demonstrate his knowledge of and full compliance with home exercise program to allow home management of pain.  4. Pt will demonstrate improvement in rotation of neck by 5 degrees in each direction.  5. Pt will demonstrate improved shoulder AROM by >/= 5 degrees in flexion and abduction in both shoulders to denote improved flexibility.    R knee:  1. Pt will be compliant with HEP to show investment in self-care and promote improvement of R knee AROM, decrease pain.  2. Pt will improve R knee AROM by 15 degrees to allow for better functional mobility.  3. Pt will be able to negotiate 8 steps with </= 2/10 pain level, denoting improvement in overall functional strength.       Long-term- 8 weeks (3/27/22)  Cervical spine/shoulders   1. Pt will report less pain with turning head side-to-side, improvement by >/= 75%.  2. Pt will present with pain-free overhead motion with flexion and/or abduction at >/= 150 degrees bilaterally.  3. Pt will denote less pain in bilateral shoulders, </= 2/10 consistently throughout one week.    R knee:  1. Pt will demonstrate >/= 20 degrees improvement in R knee AROM.  2. Pt will be able to ambulate >/= 10 mins with </= 1/10 pain level to denote improvement in R knee AROM and strength.  3. Pt will be able to negotiate 12 steps with </= 1/10 pain level to demonstrate full functional use of R knee.    PLAN     Plan of care Certification: 1/27/2022 to 3/27/22.     Plan to alternate between R knee and cervical spine/upper trapezius (each session will switch).   -For neck: Encourage opening of chest (pec stretch, open books, etc), strengthen periscapular muscles (rows,  prone/standing Y's, etc.) and around glenohumeral joint (IR/ER with resistance either side-lying or standing, etc.2), improve AROM of both shoulders (stretches via dowel flexion, wall slides, cross-body stretch).   -For R knee: Improve patellar tracking by strengthening quadriceps, improve AROM with stretches, repeated flexion via heel slides, improve standing balance (single leg stance and/or gluteal strength to decrease trendelenburg with R SLS)        Outpatient Physical Therapy 2 times weekly for 8 weeks to include the following interventions: Manual Therapy, Moist Heat/ Ice, Neuromuscular Re-ed, Patient Education, Therapeutic Activites, and Therapeutic Exercise.       Harris Brown, PT, DPT, OCS  3/2/2022

## 2022-03-03 NOTE — PROGRESS NOTES
[facility]   Physical Therapy Treatment Note / progress note/ updated plan of care     Name: Medhat Nunez  Clinic Number: [id number]    Therapy Diagnosis:   Encounter Diagnoses   Name Primary?    Decreased range of motion of shoulder, unspecified laterality Yes    Biceps tendinitis, unspecified laterality     Poor posture      Physician: Colette Malcolm MD    Visit Date: 3/4/2022    Physician Orders: PT EVAL AND TREAT  Medical Diagnosis from Referral:  M47.816 (ICD-10-CM) - Lumbar spondylosis   M48.07 (ICD-10-CM) - Spinal stenosis, lumbosacral region   M54.42,M54.41,G89.29 (ICD-10-CM) - Chronic bilateral low back pain with bilateral sciatica      Evaluation Date: 1/27/2022  Authorization Period Expiration: 02/10/2022  Plan of Care Expiration: 3/4/22- 4/4/22   Last progress note 3/4/22  Visit # / Visits authorized: 8/ 6  FOTO: next  PTA Visit #: 0/5    Time In: 10:00  Time Out: 11:00  Total Billable Time: 60 mins- 2 PPT, 2 TE     SUBJECTIVE     Pt reports: it helps but the pain comes back. Yesterday was a bad day. Only feels pain in back of the knee.     He was compliant with home exercise program.  Response to previous treatment: increased neck pain  Functional change: none at this time    This session  Right knee;   Current 8/10 (took pain medication)  After: 2-3/10  Location: back of knee     Last session:  Neck/upper trap   Pain: 3/10  Location: bilateral upper trapezius and neck      Patients goals: To manage pain in R knee and L neck and B shoulders    OBJECTIVE     3/3/22: 30 mins    Functional reach:  Internal rotation: T8=left, T12= right (eval=Left L3 , Right T10)  External rotation: T3= left, T4=right (eval=bilateral T6)    Cervical active range of motion (after SNAGs performed):  Rotation: 49 degrees (right, same as eval), 53 degrees (left, no pain today)   Prior to SNAGS: 40 deg right, 50 deg left    Shoulders range of motion (degrees), seated:  Flexion: left= 150  "(160),  Right= 168 (170)  Abduction: left= 180, right= 175   All improved since eval.    Knee active range of motion (right):  Flexion: 123 degrees, 134 after heel slides (Improved since eval)  Extension: 0 degrees    Treatment     Medhat received the treatments listed below:      Medhat received therapeutic exercises to develop strength, ROM and posture for 25 minutes with PT 1:1:  Recumbent bike x 5 mins, L2  +Hamstring/gastrocnemius stretch (Seated)- 30'' x 3 trials- "No longer having pain"  SLR 2x10, 1# ankle weights  +Heel slides (with straps) x 20 reps right lower extremity    Cervical/shoulders:  +SNAGs (rotation, left and right) x 5 reps, 10'' hold-  "feels better!"- added to home exercise program    Not today cervical/shoulder:  Chin tucks 10x5" --> 2 rounds  Seated no monies 2x10  Standing YTB rows/shoulder ext with chin tuck 2x10    Not today right knee:  Side-lying clams with RTB 3x10   Bridges with 3" hold 2x10  Leg press 3x10 double leg at 7 plates  Knee extension machine 3x10 at 15#  Lunges at stairs x20  Supine R nerve glides 2x10  Supine piriformis str 3x30"          Medhat received manual treatment to include joint mobilizations and manual stretching for 5 minutes:  Knee:  -Tibiofemoral distraction (right knee only) x Grade II, III- "feels better!"    Not today:  - cervical upper ligamentous assessment  - cervical side glide assessment.   - suboccipital release and CFM to Semispinalis insertion onto occipital protuberence    Patient Education and Home Exercises     Home Exercises Provided and Patient Education Provided   Education provided:   - consistency and technique with HEP  - Updated HEP 2/25/22  - updated and to be performed daily: 3/4/22     Written Home Exercises Provided: yes. Exercises were reviewed and Medhat was able to demonstrate them prior to the end of the session.  Medhat demonstrated good  understanding of the education provided. See EMR under Patient Instructions for exercises provided " during therapy sessions    ASSESSMENT     Medhat is a 66 y.o. male referred to outpatient Physical Therapy with a medical diagnosis of lumbar spondylosis. Progress note shows that pt's shoulder active range of motion has improved significantly, and he's met 2/5 short-term goals for cervical spine/shoulders and 2/3 goals for knee. Pt presents with improved range of motion in right knee and cervical spine after performing exercises during PT session. Pt's functional reach did not improve significantly with internal nor external rotation. Pt will benefit from daily heel slides, gastrocnemius and hamstring stretch (RLE) and cervical rotation SNAGs to decrease stiffness and pain. Recommend pt continue therapy services 2x/day to improve cervical/shoulder and/or right knee pain.        Medhat is progressing well towards his goals.   Pt prognosis is Good/Fair.     Pt will continue to benefit from skilled outpatient physical therapy to address the deficits listed in the problem list box on initial evaluation, provide pt/family education and to maximize pt's level of independence in the home and community environment.   Pt's spiritual, cultural and educational needs considered and pt agreeable to plan of care and goals.     Anticipated barriers to physical therapy: none    Goals Short-term- 4 weeks (2/27/22):   Cervical spine/shoulders   1. Pt will improve functional reach by 2 spinal segments with internal rotation bilaterally, denoting improved AROM. Not met, progressing  2. Pt will improve functional reach by 2 spinal segments with external rotation, denoting improving AROM. Not met, progressing  3. Pt will demonstrate his knowledge of and full compliance with home exercise program to allow home management of pain. Met 3/4  4. Pt will demonstrate improvement in rotation of neck by 5 degrees in each direction. Not met, progressing   5. Pt will demonstrate improved shoulder AROM by >/= 5 degrees in flexion and abduction in both  shoulders to denote improved flexibility. Met 3/4    R knee:  1. Pt will be compliant with HEP to show investment in self-care and promote improvement of R knee AROM, decrease pain. Met 3/4  2. Pt will improve R knee AROM by 15 degrees to allow for better functional mobility. Met 3/4  3. Pt will be able to negotiate 8 steps with </= 2/10 pain level, denoting improvement in overall functional strength. Not yet assessed       Long-term- 8 weeks (3/27/22)  Cervical spine/shoulders   1. Pt will report less pain with turning head side-to-side, improvement by >/= 75%.  2. Pt will present with pain-free overhead motion with flexion and/or abduction at >/= 150 degrees bilaterally.  3. Pt will denote less pain in bilateral shoulders, </= 2/10 consistently throughout one week.    R knee:  1. Pt will demonstrate >/= 20 degrees improvement in R knee AROM.  2. Pt will be able to ambulate >/= 10 mins with </= 1/10 pain level to denote improvement in R knee AROM and strength.  3. Pt will be able to negotiate 12 steps with </= 1/10 pain level to demonstrate full functional use of R knee.    PLAN   Next: Steps, treadmill to tolerance and/or IR/ER stretch of upper extremities. FOTO      Plan of care Certification: 3/4/22- 4/4/22.1- 2x/wk, alternate between right knee and cervical spine/shoulders.     Plan to alternate between R knee and cervical spine/upper trapezius (each session will switch).   -For neck/shoulders: Address internal rotation/ER range of motion, open chest, improve periscapular mm, improve cervical deep neck flexor strength.    -For R knee: Continue to strengthen, progress to closed chain and/or standing activities. More challenging      Outpatient Physical Therapy 2 times weekly for 4 weeks to include the following interventions: Manual Therapy, Moist Heat/ Ice, Neuromuscular Re-ed, Patient Education, Therapeutic Activites, and Therapeutic Exercise.       Ban Stern, PT, DPT  3/4/2022

## 2022-03-04 ENCOUNTER — CLINICAL SUPPORT (OUTPATIENT)
Dept: REHABILITATION | Facility: HOSPITAL | Age: 67
End: 2022-03-04
Payer: MEDICARE

## 2022-03-04 DIAGNOSIS — R29.3 POOR POSTURE: ICD-10-CM

## 2022-03-04 DIAGNOSIS — M25.619 DECREASED RANGE OF MOTION OF SHOULDER, UNSPECIFIED LATERALITY: Primary | ICD-10-CM

## 2022-03-04 DIAGNOSIS — M75.20 BICEPS TENDINITIS, UNSPECIFIED LATERALITY: ICD-10-CM

## 2022-03-04 PROCEDURE — 97110 THERAPEUTIC EXERCISES: CPT | Mod: PN

## 2022-03-04 PROCEDURE — 97750 PHYSICAL PERFORMANCE TEST: CPT | Mod: PN

## 2022-03-07 ENCOUNTER — OFFICE VISIT (OUTPATIENT)
Dept: PAIN MEDICINE | Facility: CLINIC | Age: 67
End: 2022-03-07
Payer: MEDICARE

## 2022-03-07 ENCOUNTER — TELEPHONE (OUTPATIENT)
Dept: PAIN MEDICINE | Facility: CLINIC | Age: 67
End: 2022-03-07
Payer: MEDICARE

## 2022-03-07 VITALS
DIASTOLIC BLOOD PRESSURE: 76 MMHG | HEART RATE: 85 BPM | SYSTOLIC BLOOD PRESSURE: 128 MMHG | WEIGHT: 237 LBS | BODY MASS INDEX: 35 KG/M2

## 2022-03-07 DIAGNOSIS — M54.16 LUMBAR RADICULOPATHY: Primary | ICD-10-CM

## 2022-03-07 DIAGNOSIS — G89.29 CHRONIC BILATERAL LOW BACK PAIN WITH BILATERAL SCIATICA: ICD-10-CM

## 2022-03-07 DIAGNOSIS — M47.816 LUMBAR SPONDYLOSIS: ICD-10-CM

## 2022-03-07 DIAGNOSIS — M54.16 LUMBAR RADICULOPATHY, CHRONIC: Primary | ICD-10-CM

## 2022-03-07 DIAGNOSIS — M54.42 CHRONIC BILATERAL LOW BACK PAIN WITH BILATERAL SCIATICA: ICD-10-CM

## 2022-03-07 DIAGNOSIS — M25.50 POLYARTHRALGIA: ICD-10-CM

## 2022-03-07 DIAGNOSIS — M48.07 SPINAL STENOSIS, LUMBOSACRAL REGION: ICD-10-CM

## 2022-03-07 DIAGNOSIS — M54.41 CHRONIC BILATERAL LOW BACK PAIN WITH BILATERAL SCIATICA: ICD-10-CM

## 2022-03-07 PROCEDURE — 1160F PR REVIEW ALL MEDS BY PRESCRIBER/CLIN PHARMACIST DOCUMENTED: ICD-10-PCS | Mod: CPTII,S$GLB,, | Performed by: NURSE PRACTITIONER

## 2022-03-07 PROCEDURE — 99214 OFFICE O/P EST MOD 30 MIN: CPT | Mod: S$GLB,,, | Performed by: NURSE PRACTITIONER

## 2022-03-07 PROCEDURE — 3078F PR MOST RECENT DIASTOLIC BLOOD PRESSURE < 80 MM HG: ICD-10-PCS | Mod: CPTII,S$GLB,, | Performed by: NURSE PRACTITIONER

## 2022-03-07 PROCEDURE — 1125F PR PAIN SEVERITY QUANTIFIED, PAIN PRESENT: ICD-10-PCS | Mod: CPTII,S$GLB,, | Performed by: NURSE PRACTITIONER

## 2022-03-07 PROCEDURE — 99999 PR PBB SHADOW E&M-EST. PATIENT-LVL III: CPT | Mod: PBBFAC,,, | Performed by: NURSE PRACTITIONER

## 2022-03-07 PROCEDURE — 4010F PR ACE/ARB THEARPY RXD/TAKEN: ICD-10-PCS | Mod: CPTII,S$GLB,, | Performed by: NURSE PRACTITIONER

## 2022-03-07 PROCEDURE — 99214 PR OFFICE/OUTPT VISIT, EST, LEVL IV, 30-39 MIN: ICD-10-PCS | Mod: S$GLB,,, | Performed by: NURSE PRACTITIONER

## 2022-03-07 PROCEDURE — 1160F RVW MEDS BY RX/DR IN RCRD: CPT | Mod: CPTII,S$GLB,, | Performed by: NURSE PRACTITIONER

## 2022-03-07 PROCEDURE — 1125F AMNT PAIN NOTED PAIN PRSNT: CPT | Mod: CPTII,S$GLB,, | Performed by: NURSE PRACTITIONER

## 2022-03-07 PROCEDURE — 3008F BODY MASS INDEX DOCD: CPT | Mod: CPTII,S$GLB,, | Performed by: NURSE PRACTITIONER

## 2022-03-07 PROCEDURE — 3008F PR BODY MASS INDEX (BMI) DOCUMENTED: ICD-10-PCS | Mod: CPTII,S$GLB,, | Performed by: NURSE PRACTITIONER

## 2022-03-07 PROCEDURE — 1159F MED LIST DOCD IN RCRD: CPT | Mod: CPTII,S$GLB,, | Performed by: NURSE PRACTITIONER

## 2022-03-07 PROCEDURE — 4010F ACE/ARB THERAPY RXD/TAKEN: CPT | Mod: CPTII,S$GLB,, | Performed by: NURSE PRACTITIONER

## 2022-03-07 PROCEDURE — 3074F SYST BP LT 130 MM HG: CPT | Mod: CPTII,S$GLB,, | Performed by: NURSE PRACTITIONER

## 2022-03-07 PROCEDURE — 99999 PR PBB SHADOW E&M-EST. PATIENT-LVL III: ICD-10-PCS | Mod: PBBFAC,,, | Performed by: NURSE PRACTITIONER

## 2022-03-07 PROCEDURE — 3074F PR MOST RECENT SYSTOLIC BLOOD PRESSURE < 130 MM HG: ICD-10-PCS | Mod: CPTII,S$GLB,, | Performed by: NURSE PRACTITIONER

## 2022-03-07 PROCEDURE — 3078F DIAST BP <80 MM HG: CPT | Mod: CPTII,S$GLB,, | Performed by: NURSE PRACTITIONER

## 2022-03-07 PROCEDURE — 1159F PR MEDICATION LIST DOCUMENTED IN MEDICAL RECORD: ICD-10-PCS | Mod: CPTII,S$GLB,, | Performed by: NURSE PRACTITIONER

## 2022-03-07 RX ORDER — DULOXETIN HYDROCHLORIDE 30 MG/1
60 CAPSULE, DELAYED RELEASE ORAL DAILY
Qty: 60 CAPSULE | Refills: 0 | Status: SHIPPED | OUTPATIENT
Start: 2022-03-07 | End: 2022-08-29

## 2022-03-07 NOTE — PROGRESS NOTES
[facility]  Established Clinic Visit  Patient H&P    Referring Provider: No referring provider defined for this encounter.    Chief Complaint:   Chief Complaint   Patient presents with    Leg Pain     Right        History of Present Illness: Medhat Nunez is a 66 y.o. male referred by Rodolfo Tse MD for low back pain.      He notes pain all over his body, both hands, both shoulders, neck, low back, both flanks, both arms, both legs, both knees (right worse than left), both feet that started in 2017. He states he saw an outside rheumatologist, who diagnosed him with fibromyalgia.  He was started on with sounds like amitriptyline, but this causes bladder retention.    Back pain has been present since around 2017. Back pain is located diffusely in the low back and radiates into the bilateral groins and down the right posterior leg around the knee, especially when he walks. Pain is described as aching. The pain is aggravated by standing and walking. The pain is alleviated by laying down, sitting. He feels weakness in both arms and legs, especially with walking. He gets numbness in his legs at times. Denies changes in bowel or bladder function. Denies saddle anesthesia. Denies recent fevers or infections. Denies significant weight loss      Severity: Currently: 10/10   Typical Range: 5-10/10     Exacerbation: 10/10     Interval updates    3/7/2022 - Mr. Nunez is following up for Leg Pain (Right ) Pain is currently rate 4/10 with a weekly range 4-5/10.  Patient presented with his daughter.  Patient reports  attending physical therapy with minimal relief of his pain. His primary  source of pain today is his right leg, he reports relief once he is in physical therapy but once he leaves the building he states his pain returns.  Denies any bowel bladder dysfunction, denies any profound weakness, denies any saddle anesthesia denies any recent incident or trauma.    Previous Interventions:  -   Mary performed some injections in his back which helped    Previous Therapies:  PT/OT: yes   Relevant Surgery: yes    - Cervical fusion in 2013 at [facility]  Previous Medications:   - NSAIDS: diclofenac  - Muscle Relaxants: robaxin   - TCAs:  Elavil caused bladder retention  - SNRIs:   - Topicals:   - Anticonvulsants:    - Opioids:     Current Pain Medications:  1. Methocarbamol  2. Diclofenac     Blood Thinners: None    Full Medication List:    Current Outpatient Medications:     diclofenac (VOLTAREN) 75 MG EC tablet, Take 75 mg by mouth daily as needed., Disp: , Rfl:     DULoxetine (CYMBALTA) 30 MG capsule, Take 1 capsule (30 mg total) by mouth once daily., Disp: 30 capsule, Rfl: 11    lisinopriL (PRINIVIL,ZESTRIL) 40 MG tablet, Take 1 tablet (40 mg total) by mouth once daily., Disp: 90 tablet, Rfl: 3    methocarbamoL (ROBAXIN) 750 MG Tab, Take 750 mg by mouth 2 (two) times daily as needed., Disp: , Rfl:     multivitamin capsule, Take 1 capsule by mouth once daily., Disp: , Rfl:      Review of Systems:  Review of Systems   Constitutional: Negative for fever and weight loss.   HENT: Negative for ear pain and tinnitus.    Eyes: Positive for pain. Negative for redness.   Respiratory: Negative for cough and shortness of breath.    Cardiovascular: Negative for chest pain and palpitations.   Gastrointestinal: Positive for abdominal pain and constipation. Negative for heartburn.   Genitourinary: Negative.    Musculoskeletal: Positive for back pain, joint pain, myalgias and neck pain.   Skin: Negative for itching and rash.   Neurological: Positive for dizziness, tingling and weakness. Negative for seizures.   Endo/Heme/Allergies: Negative for environmental allergies. Does not bruise/bleed easily.   Psychiatric/Behavioral: Positive for depression. The patient is nervous/anxious and has insomnia.        Allergies:  Patient has no known allergies.     Medical History:   has a past medical history of Hypertension  (7/9/2021).    Surgical History:   has a past surgical history that includes Hernia repair; Shoulder surgery (Right); and Cervical fusion.    Family History:  family history is not on file.    Social History:   reports that he has never smoked. He has never used smokeless tobacco. He reports that he does not drink alcohol.    Physical Exam:  There were no vitals taken for this visit.  GEN: No acute distress. Calm, comfortable  HENT: Normocephalic, atraumatic, moist mucous membranes  EYE: Anicteric sclera, non-injected.   CV: Non-diaphoretic. Regular Rate. Radial Pulses 2+.  RESP: Breathing comfortably. Chest expansion symmetric.  EXT: No clubbing, cyanosis.   SKIN: Warm, & dry to palpation. No visible rashes or lesions of exposed skin.   PSYCH: Pleasant mood and appropriate affect. Recent and remote memory intact.   GAIT: Independent, normal ambulation  MSK:  No diffuse tenderness to palpation over muscular regions, but most pain over joints of hand and fingers and elbows.  Lumbar Spine Exam:       Inspection: No erythema, bruising.       Palpation: (+) TTP of lumbar paraspinals on the left and left SIJ       ROM:  Limited in flexion, extension, lateral bending.       (+) Facet loading bilaterally      (-) Straight Leg Raise bilaterally      (+) ISAIAS bilaterally  Hip Exam:      Inspection: No gross deformity or apparent leg length discrepancy      Palpation: No TTP to bilateral greater trochanteric bursas.       ROM: Limitation in internal rotation bilaterally, but no pain  Neurologic Exam:     Alert. Speech is fluent and appropriate.     Strength:  5/5 throughout bilateral lower extremities     Sensation:  Grossly intact to light touch in bilateral lower extremities     Reflexes: 2+ in left and 1+ in right patella, and 1+ in b/l achilles     Tone: No abnormality appreciated in bilateral lower extremities     No Clonus     (-) Talbot bilaterally                Imaging:  - X-Ray Lumbar Spine Ap And Lateral  07/16/2021  Impression:  Mild spondylosis of the lumbar spine.    Labs:  BMP  Lab Results   Component Value Date     06/25/2021    K 4.9 06/25/2021     06/25/2021    CO2 25 06/25/2021    BUN 19 06/25/2021    CREATININE 1.4 06/25/2021    CALCIUM 9.6 06/25/2021    ANIONGAP 12 06/25/2021    ESTGFRAFRICA >60 06/25/2021    EGFRNONAA 52 (A) 06/25/2021     Lab Results   Component Value Date    ALT 38 01/11/2021    AST 21 01/11/2021    ALKPHOS 92 01/11/2021    BILITOT 0.3 01/11/2021     Lab Results   Component Value Date     01/11/2021     Lab Results   Component Value Date    CRP 1.2 07/28/2021     Lab Results   Component Value Date    SEDRATE 25 (H) 07/28/2021       Assessment:  Medhat Nunez is a 66 y.o. male with the following diagnoses based on history, exam, and imaging:    Problem List Items Addressed This Visit    None     Visit Diagnoses     Lumbar radiculopathy, chronic    -  Primary    Relevant Medications    DULoxetine (CYMBALTA) 30 MG capsule    Spinal stenosis, lumbosacral region        Relevant Medications    DULoxetine (CYMBALTA) 30 MG capsule    Lumbar spondylosis        Relevant Medications    DULoxetine (CYMBALTA) 30 MG capsule    Polyarthralgia        Relevant Medications    DULoxetine (CYMBALTA) 30 MG capsule    Chronic bilateral low back pain with bilateral sciatica        Relevant Medications    DULoxetine (CYMBALTA) 30 MG capsule          This is a pleasant 66 y.o. gentleman presenting with:     - Chronic bilateral low back pain with right radicular pain and bilateral claudication symptoms:  Minimal findings on lumbar x-ray, but patient appears to right radicular pain and bilateral claudication symptoms which appears consistent with lumbar spinal stenosis.  - polyarthralgia/chronic widespread pain:  He was previously diagnosed with fibromyalgia.  I am not sure that is the correct diagnosis at this time, but more likely represents diffuse osteoarthritis, or potentially rheumatoid  arthritis. Inflammatory markers not significant previously  - Comorbidities: CKD. Depression and Anxiety on ROS.     03/07/2022- 66-year-old male with history of bilateral low back pain and bilateral leg pain with claudication symptoms.  He had minimal relief with physical therapy he was previously started on Cymbalta 30 mg with no relief of his right leg pain, did not report any adverse side effects with Cymbalta.  Discussed with patient I will schedule for a right lumbar SERA targeting L5-S1, and I will also increase his Cymbalta from 30 mg to 60. Patient verbalized understanding and agreed.      Treatment Plan:   - PT/OT/HEP: Completed  PT. Discussed benefits of exercise for pain.   - Procedures: s/f a Right TFESI L5-S1   - Medications: Increase Cymbalta from 30 mg to 60 mg.  - Imaging: Reviewed.   - Labs: Reviewed.  Medications are appropriately dosed for current hepatorenal function.      Follow Up: RTC 2-3 weeks      Disclaimer: This note was partly generated using dictation software which may occasionally result in transcription errors.

## 2022-03-10 ENCOUNTER — TELEPHONE (OUTPATIENT)
Dept: REHABILITATION | Facility: HOSPITAL | Age: 67
End: 2022-03-10
Payer: MEDICARE

## 2022-03-10 NOTE — TELEPHONE ENCOUNTER
Attempted to call patient in regards to missed therapy appointment today. Reminded patient of next therapy appointment and left a call back number for any questions.    Terra Ruff, PTA

## 2022-03-15 ENCOUNTER — DOCUMENTATION ONLY (OUTPATIENT)
Dept: REHABILITATION | Facility: HOSPITAL | Age: 67
End: 2022-03-15
Payer: MEDICARE

## 2022-03-15 ENCOUNTER — TELEPHONE (OUTPATIENT)
Dept: PAIN MEDICINE | Facility: CLINIC | Age: 67
End: 2022-03-15
Payer: MEDICARE

## 2022-03-15 NOTE — PROGRESS NOTES
Outpatient Therapy Discharge Summary     Name: Medhat Nunez  [facility] Number: [id number]    Therapy Diagnosis: No diagnosis found.  Physician: No ref. provider found    Physician Orders: PT EVAL AND TREAT  Medical Diagnosis from Referral:  M47.816 (ICD-10-CM) - Lumbar spondylosis   M48.07 (ICD-10-CM) - Spinal stenosis, lumbosacral region   M54.42,M54.41,G89.29 (ICD-10-CM) - Chronic bilateral low back pain with bilateral sciatica      Evaluation Date: 1/27/2022  Authorization Period Expiration: 02/10/2022  Plan of Care Expiration: 3/4/22- 4/4/22              Last progress note 3/4/22  Visit # / Visits authorized: 8      Total Visits Received: 8      Assessment    Goals: Goals Short-term- 4 weeks (2/27/22):   Cervical spine/shoulders   1. Pt will improve functional reach by 2 spinal segments with internal rotation bilaterally, denoting improved AROM. Not met  2. Pt will improve functional reach by 2 spinal segments with external rotation, denoting improving AROM. Not met  3. Pt will demonstrate his knowledge of and full compliance with home exercise program to allow home management of pain. Met 3/4  4. Pt will demonstrate improvement in rotation of neck by 5 degrees in each direction. Not met   5. Pt will demonstrate improved shoulder AROM by >/= 5 degrees in flexion and abduction in both shoulders to denote improved flexibility. Met 3/4     R knee:  1. Pt will be compliant with HEP to show investment in self-care and promote improvement of R knee AROM, decrease pain. Met 3/4  2. Pt will improve R knee AROM by 15 degrees to allow for better functional mobility. Met 3/4  3. Pt will be able to negotiate 8 steps with </= 2/10 pain level, denoting improvement in overall functional strength. Not met         Long-term- 8 weeks (3/27/22) Not met  Cervical spine/shoulders   1. Pt will report less pain with turning head side-to-side, improvement by >/= 75%.  2. Pt will present with pain-free overhead motion with flexion  and/or abduction at >/= 150 degrees bilaterally.  3. Pt will denote less pain in bilateral shoulders, </= 2/10 consistently throughout one week.     R knee: Not met  1. Pt will demonstrate >/= 20 degrees improvement in R knee AROM.  2. Pt will be able to ambulate >/= 10 mins with </= 1/10 pain level to denote improvement in R knee AROM and strength.  3. Pt will be able to negotiate 12 steps with </= 1/10 pain level to demonstrate full functional use of R knee.          Discharge reason: Other:  Requested D/C. Daughter proxied for pt since pt does not speak English well, per daughter. He felt therapy helped him in the moment but long-term did not help. Will have a procedure done soon for pain relief and wishes to discharge from therapy.    Plan   This patient is discharged from Physical Therapy

## 2022-03-15 NOTE — DISCHARGE INSTRUCTIONS
Home Care Instructions Pain Management:    1.  DIET:    You may resume your normal diet today.    2.  BATHING:    You may shower with luke warm water.    3.  DRESSING:    You may remove your bandage today.    4.  ACTIVITY LEVEL:      You may resume your normal activities 24 hours after your procedure.    5.  MEDICATIONS:    You may resume your normal medications today.    6.  SPECIAL INSTRUCTIONS:    No heat to the injection site for 24 hours including bath or shower, heating pad, moist heat or hot tubs.    Use an ice pack to the injection site for any pain or discomfort.  Apply ice packs for 20 minute intervals as needed.    If you have received any sedatives by mouth today, you can not drive for 12 hours.    If you have received sedation through an IV, you can not drive for 24 hours.    PLEASE CALL YOUR DOCTOR FOR THE FOLLOWIN.  Redness or swelling around the injection site.  2.  Fever of 101 degrees.  3.  Drainage (pus) from the injection site.  4.  For any continuous bleeding (some dried blood over the incision is normal.)    FOR EMERGENCIES:    If any unusual problems or difficulties occur during clinic hours, call [phone] or dial [phone].    Follow up with with your physician in 2-3 weeks.

## 2022-03-16 ENCOUNTER — HOSPITAL ENCOUNTER (OUTPATIENT)
Facility: HOSPITAL | Age: 67
Discharge: HOME | End: 2022-03-16
Attending: PHYSICAL MEDICINE & REHABILITATION | Admitting: PHYSICAL MEDICINE & REHABILITATION
Payer: MEDICARE

## 2022-03-16 VITALS
OXYGEN SATURATION: 97 % | DIASTOLIC BLOOD PRESSURE: 81 MMHG | RESPIRATION RATE: 16 BRPM | SYSTOLIC BLOOD PRESSURE: 163 MMHG | BODY MASS INDEX: 31.5 KG/M2 | HEIGHT: 70 IN | TEMPERATURE: 98 F | WEIGHT: 220 LBS | HEART RATE: 82 BPM

## 2022-03-16 DIAGNOSIS — R52 PAIN: ICD-10-CM

## 2022-03-16 DIAGNOSIS — M51.36 DDD (DEGENERATIVE DISC DISEASE), LUMBAR: ICD-10-CM

## 2022-03-16 DIAGNOSIS — M54.16 LUMBAR RADICULOPATHY: Primary | ICD-10-CM

## 2022-03-16 PROCEDURE — 25000003 PHARM REV CODE 250: Performed by: PHYSICAL MEDICINE & REHABILITATION

## 2022-03-16 PROCEDURE — 64483 NJX AA&/STRD TFRM EPI L/S 1: CPT | Mod: RT,,, | Performed by: PHYSICAL MEDICINE & REHABILITATION

## 2022-03-16 PROCEDURE — 64483 PR EPIDURAL INJ, ANES/STEROID, TRANSFORAMINAL, LUMB/SACR, SNGL LEVL: ICD-10-PCS | Mod: RT,,, | Performed by: PHYSICAL MEDICINE & REHABILITATION

## 2022-03-16 PROCEDURE — 25500020 PHARM REV CODE 255: Performed by: PHYSICAL MEDICINE & REHABILITATION

## 2022-03-16 PROCEDURE — 64483 NJX AA&/STRD TFRM EPI L/S 1: CPT | Performed by: PHYSICAL MEDICINE & REHABILITATION

## 2022-03-16 PROCEDURE — 63600175 PHARM REV CODE 636 W HCPCS: Performed by: PHYSICAL MEDICINE & REHABILITATION

## 2022-03-16 RX ORDER — INDOMETHACIN 25 MG/1
CAPSULE ORAL
Status: DISCONTINUED | OUTPATIENT
Start: 2022-03-16 | End: 2022-03-16 | Stop reason: HOSPADM

## 2022-03-16 RX ORDER — DEXAMETHASONE SODIUM PHOSPHATE 4 MG/ML
INJECTION, SOLUTION INTRA-ARTICULAR; INTRALESIONAL; INTRAMUSCULAR; INTRAVENOUS; SOFT TISSUE
Status: DISCONTINUED | OUTPATIENT
Start: 2022-03-16 | End: 2022-03-16 | Stop reason: HOSPADM

## 2022-03-16 RX ORDER — LIDOCAINE HYDROCHLORIDE 10 MG/ML
INJECTION INFILTRATION; PERINEURAL
Status: DISCONTINUED | OUTPATIENT
Start: 2022-03-16 | End: 2022-03-16 | Stop reason: HOSPADM

## 2022-03-16 NOTE — DISCHARGE SUMMARY
[facility]  Discharge Note  Short Stay     Admit Date: 3/16/2022    Discharge Date: 3/16/2022     Attending Physician: Colette Malcolm M.D.    Diagnoses:  Active Hospital Problems    Diagnosis  POA    *Lumbar radiculopathy [M54.16]  Yes      Resolved Hospital Problems   No resolved problems to display.     Discharged Condition: Good     Hospital Course: Patient was admitted for an outpatient interventional pain management procedure and tolerated the procedure well with no complications.     Final Diagnoses: Same as principal problem.     Disposition: Home or Self Care     Follow up/Patient Instructions:   Follow-up in 1-2 weeks unless otherwise instructed. May return sooner as needed.       Reconciled Medications:     Medication List      CONTINUE taking these medications    diclofenac 75 MG EC tablet  Commonly known as: VOLTAREN  Take 75 mg by mouth daily as needed.     DULoxetine 30 MG capsule  Commonly known as: CYMBALTA  Take 2 capsules (60 mg total) by mouth once daily.     lisinopriL 40 MG tablet  Commonly known as: PRINIVIL,ZESTRIL  Take 1 tablet (40 mg total) by mouth once daily.     methocarbamoL 750 MG Tab  Commonly known as: ROBAXIN  Take 750 mg by mouth 2 (two) times daily as needed.     multivitamin capsule  Take 1 capsule by mouth once daily.           Discharge Procedure Orders (must include Diet, Follow-up, Activity)   Ice to affected area   Order Comments: 20 minutes of ice or until area numb to the touch if area is sore 2-3 times per day as needed     No driving until:   Order Comments: Until following day     No dressing needed     Notify your health care provider if you experience any of the following:  temperature >100.4     Notify your health care provider if you experience any of the following:  persistent nausea and vomiting or diarrhea     Notify your health care provider if you experience any of the following:  severe uncontrolled pain     Notify your health care provider if you  experience any of the following:  redness, tenderness, or signs of infection (pain, swelling, redness, odor or green/yellow discharge around incision site)     Notify your health care provider if you experience any of the following:  difficulty breathing or increased cough     Notify your health care provider if you experience any of the following:  severe persistent headache     Notify your health care provider if you experience any of the following:  worsening rash     Notify your health care provider if you experience any of the following:  persistent dizziness, light-headedness, or visual disturbances     Notify your health care provider if you experience any of the following:  increased confusion or weakness     Shower on day dressing removed (No bath)       Colette Malcolm M.D.  Interventional Pain Medicine / Physical Medicine & Rehabilitation

## 2022-03-16 NOTE — OP NOTE
Lumbar Transforaminal Epidural Steroid Injection under fluoroscopic guidance  I have reviewed the patient's medications, allergies and relevant histories prior to the procedure and no contraindications have been identified. The risks, benefits and alternatives to the procedure were discussed with the patient, and all questions regarding the procedure were answered to the patient's satisfaction. I personally obtained Medhat's consent prior to the start of the procedure and the signed consent can be found in the patient's chart.                                                         Time-out was taken to identify patient, procedure, laterality, and allergies prior to starting the procedure.       Date of Service: 03/16/2022  Procedure: Right L5 transforaminal epidural steroid injection under fluoroscopy  Pre-Operative Diagnosis: Lumbar Radiculopathy  Post-Operative Diagnosis: Lumbar Radiculopathy    Physician: Colette Malcolm M.D.  Assistants: Genia Dumont M.D.      Medications Injected:  Preservative-free dexamethasone 10 mg/mL & 2 mL of Xylocaine 1% MPF.   Local Anesthetic: Xylocaine 1% 10 mL with Sodium Bicarbonate 1ml.   Sedation Medications: None.     Procedural Technique:   Laying in a prone position, the patient was prepped and draped in the usual sterile fashion using ChloraPrep and fenestrated drape.  Hemodynamic monitoring was initiated, including blood pressure, pulse oximetry and EKG. The vertebral foramen of interest was determined under fluoroscopic guidance.  Local anesthetic was given by raising a wheel and going down to the hub of a 25-gauge 1.5 inch needle.  The 5 inch 22-gauge spinal needle was introduced towards the inferior-medial aspect transverse process of each above named nerve root levels.  The needle was walked medially then hinged into the neural foramen.  After negative aspiration, 2-3 mL of Omnipaque was injected to confirm appropriate placement and that there was no vascular runoff.  The  medication was then injected slowly. Needle was removed and a bandage was applied to the area. The patient tolerated the procedure well.     Estimated Blood Loss:  None.  Complications:  None.     Disposition: Vital signs remained stable throughout the procedure. The patient was taken to the recovery area where written discharge instructions for the procedure were given.     Follow-Up: We will see the patient back in two weeks or the patient may call to inform of status. The patient was discharged in a stable condition.

## 2022-03-22 ENCOUNTER — PATIENT MESSAGE (OUTPATIENT)
Dept: PAIN MEDICINE | Facility: CLINIC | Age: 67
End: 2022-03-22
Payer: MEDICARE

## 2022-03-31 ENCOUNTER — OFFICE VISIT (OUTPATIENT)
Dept: PAIN MEDICINE | Facility: CLINIC | Age: 67
End: 2022-03-31
Payer: MEDICARE

## 2022-03-31 ENCOUNTER — TELEPHONE (OUTPATIENT)
Dept: PAIN MEDICINE | Facility: CLINIC | Age: 67
End: 2022-03-31
Payer: MEDICARE

## 2022-03-31 ENCOUNTER — HOSPITAL ENCOUNTER (OUTPATIENT)
Dept: RADIOLOGY | Facility: HOSPITAL | Age: 67
Discharge: HOME | End: 2022-03-31
Attending: NURSE PRACTITIONER
Payer: MEDICARE

## 2022-03-31 VITALS
BODY MASS INDEX: 33.56 KG/M2 | HEART RATE: 76 BPM | SYSTOLIC BLOOD PRESSURE: 129 MMHG | DIASTOLIC BLOOD PRESSURE: 78 MMHG | WEIGHT: 233.94 LBS

## 2022-03-31 DIAGNOSIS — M25.561 POSTERIOR KNEE PAIN, RIGHT: Primary | ICD-10-CM

## 2022-03-31 DIAGNOSIS — M51.36 DDD (DEGENERATIVE DISC DISEASE), LUMBAR: ICD-10-CM

## 2022-03-31 DIAGNOSIS — M25.561 POSTERIOR KNEE PAIN, RIGHT: ICD-10-CM

## 2022-03-31 DIAGNOSIS — M54.16 LUMBAR RADICULOPATHY: Primary | ICD-10-CM

## 2022-03-31 DIAGNOSIS — M25.50 POLYARTHRALGIA: ICD-10-CM

## 2022-03-31 DIAGNOSIS — M54.16 LUMBAR RADICULOPATHY: ICD-10-CM

## 2022-03-31 PROCEDURE — 73560 X-RAY EXAM OF KNEE 1 OR 2: CPT | Mod: TC,FY,RT

## 2022-03-31 PROCEDURE — 73560 X-RAY EXAM OF KNEE 1 OR 2: CPT | Mod: 26,RT,, | Performed by: RADIOLOGY

## 2022-03-31 PROCEDURE — 1160F PR REVIEW ALL MEDS BY PRESCRIBER/CLIN PHARMACIST DOCUMENTED: ICD-10-PCS | Mod: CPTII,S$GLB,, | Performed by: NURSE PRACTITIONER

## 2022-03-31 PROCEDURE — 3008F BODY MASS INDEX DOCD: CPT | Mod: CPTII,S$GLB,, | Performed by: NURSE PRACTITIONER

## 2022-03-31 PROCEDURE — 99999 PR PBB SHADOW E&M-EST. PATIENT-LVL III: CPT | Mod: PBBFAC,,, | Performed by: NURSE PRACTITIONER

## 2022-03-31 PROCEDURE — 4010F PR ACE/ARB THEARPY RXD/TAKEN: ICD-10-PCS | Mod: CPTII,S$GLB,, | Performed by: NURSE PRACTITIONER

## 2022-03-31 PROCEDURE — 3074F PR MOST RECENT SYSTOLIC BLOOD PRESSURE < 130 MM HG: ICD-10-PCS | Mod: CPTII,S$GLB,, | Performed by: NURSE PRACTITIONER

## 2022-03-31 PROCEDURE — 1160F RVW MEDS BY RX/DR IN RCRD: CPT | Mod: CPTII,S$GLB,, | Performed by: NURSE PRACTITIONER

## 2022-03-31 PROCEDURE — 3078F DIAST BP <80 MM HG: CPT | Mod: CPTII,S$GLB,, | Performed by: NURSE PRACTITIONER

## 2022-03-31 PROCEDURE — 1125F PR PAIN SEVERITY QUANTIFIED, PAIN PRESENT: ICD-10-PCS | Mod: CPTII,S$GLB,, | Performed by: NURSE PRACTITIONER

## 2022-03-31 PROCEDURE — 99215 PR OFFICE/OUTPT VISIT, EST, LEVL V, 40-54 MIN: ICD-10-PCS | Mod: S$GLB,,, | Performed by: NURSE PRACTITIONER

## 2022-03-31 PROCEDURE — 73560 XR KNEE 1 OR 2 VIEW RIGHT: ICD-10-PCS | Mod: 26,RT,, | Performed by: RADIOLOGY

## 2022-03-31 PROCEDURE — 4010F ACE/ARB THERAPY RXD/TAKEN: CPT | Mod: CPTII,S$GLB,, | Performed by: NURSE PRACTITIONER

## 2022-03-31 PROCEDURE — 3008F PR BODY MASS INDEX (BMI) DOCUMENTED: ICD-10-PCS | Mod: CPTII,S$GLB,, | Performed by: NURSE PRACTITIONER

## 2022-03-31 PROCEDURE — 99999 PR PBB SHADOW E&M-EST. PATIENT-LVL III: ICD-10-PCS | Mod: PBBFAC,,, | Performed by: NURSE PRACTITIONER

## 2022-03-31 PROCEDURE — 3078F PR MOST RECENT DIASTOLIC BLOOD PRESSURE < 80 MM HG: ICD-10-PCS | Mod: CPTII,S$GLB,, | Performed by: NURSE PRACTITIONER

## 2022-03-31 PROCEDURE — 1159F PR MEDICATION LIST DOCUMENTED IN MEDICAL RECORD: ICD-10-PCS | Mod: CPTII,S$GLB,, | Performed by: NURSE PRACTITIONER

## 2022-03-31 PROCEDURE — 1159F MED LIST DOCD IN RCRD: CPT | Mod: CPTII,S$GLB,, | Performed by: NURSE PRACTITIONER

## 2022-03-31 PROCEDURE — 3074F SYST BP LT 130 MM HG: CPT | Mod: CPTII,S$GLB,, | Performed by: NURSE PRACTITIONER

## 2022-03-31 PROCEDURE — 1125F AMNT PAIN NOTED PAIN PRSNT: CPT | Mod: CPTII,S$GLB,, | Performed by: NURSE PRACTITIONER

## 2022-03-31 PROCEDURE — 99215 OFFICE O/P EST HI 40 MIN: CPT | Mod: S$GLB,,, | Performed by: NURSE PRACTITIONER

## 2022-03-31 NOTE — PROGRESS NOTES
[facility]  Established Clinic Visit  Patient H&P    Referring Provider: No referring provider defined for this encounter.    Chief Complaint:   Chief Complaint   Patient presents with    Knee Pain     Right posterior knee pain       History of Present Illness: Medhat Nunez is a 67 y.o. male referred by Rodolfo Tse MD for low back pain.      He notes pain all over his body, both hands, both shoulders, neck, low back, both flanks, both arms, both legs, both knees (right worse than left), both feet that started in 2017. He states he saw an outside rheumatologist, who diagnosed him with fibromyalgia.  He was started on with sounds like amitriptyline, but this causes bladder retention.    Back pain has been present since around 2017. Back pain is located diffusely in the low back and radiates into the bilateral groins and down the right posterior leg around the knee, especially when he walks. Pain is described as aching. The pain is aggravated by standing and walking. The pain is alleviated by laying down, sitting. He feels weakness in both arms and legs, especially with walking. He gets numbness in his legs at times. Denies changes in bowel or bladder function. Denies saddle anesthesia. Denies recent fevers or infections. Denies significant weight loss      Severity: Currently: 10/10   Typical Range: 5-10/10     Exacerbation: 10/10       Interval History (03/31/2022):  Medhat Nunez returns today for follow up, he is status post a right L5 TF SERA, he denies any low back pain so he did get relief from this injection however he is still complaining of right posterior and lateral knee pain that begins just above his knee and intermittently radiates to his right lateral calf.  Describes his pain as throbbing and sharp at times.  Pain is worse when driving, bending and is resolved at time with stretching his right leg.  He was previously started on Cymbalta with an increase to 60 mg he denies  any adverse side effects with this current medication.  He denies any profound weakness, denies any bowel bladder dysfunction denies any saddle anesthesia denies any recent incident or trauma.    Current Pain Scales:  Current: 4/10              Typical Range: 4-10/10    Previous Interventions:  -03/16/2022  Right L5 transforaminal epidural steroid resolved low back pain  - Dr. Hernandez performed some injections in his back which helped    Previous Therapies:  PT/OT: yes   Relevant Surgery: yes    - Cervical fusion in 2013 at [facility]  Previous Medications:   - NSAIDS: diclofenac  - Muscle Relaxants: robaxin   - TCAs:  Elavil caused bladder retention  - SNRIs:   - Topicals:   - Anticonvulsants:    - Opioids:     Current Pain Medications:  1. Methocarbamol  2. Diclofenac   3. Cymbalta 60 mg daily    Blood Thinners: None    Full Medication List:    Current Outpatient Medications:     diclofenac (VOLTAREN) 75 MG EC tablet, Take 75 mg by mouth daily as needed., Disp: , Rfl:     DULoxetine (CYMBALTA) 30 MG capsule, Take 2 capsules (60 mg total) by mouth once daily., Disp: 60 capsule, Rfl: 0    lisinopriL (PRINIVIL,ZESTRIL) 40 MG tablet, Take 1 tablet (40 mg total) by mouth once daily., Disp: 90 tablet, Rfl: 3    methocarbamoL (ROBAXIN) 750 MG Tab, Take 750 mg by mouth 2 (two) times daily as needed., Disp: , Rfl:     multivitamin capsule, Take 1 capsule by mouth once daily., Disp: , Rfl:      Review of Systems:  Review of Systems   Constitutional: Negative for fever and weight loss.   HENT: Negative for ear pain and tinnitus.    Eyes: Positive for pain. Negative for redness.   Respiratory: Negative for cough and shortness of breath.    Cardiovascular: Negative for chest pain and palpitations.   Gastrointestinal: Positive for abdominal pain and constipation. Negative for heartburn.   Genitourinary: Negative.    Musculoskeletal: Positive for back pain, joint pain, myalgias and neck pain.   Skin: Negative for itching and rash.    Neurological: Positive for dizziness, tingling and weakness. Negative for seizures.   Endo/Heme/Allergies: Negative for environmental allergies. Does not bruise/bleed easily.   Psychiatric/Behavioral: Positive for depression. The patient is nervous/anxious and has insomnia.        Allergies:  Patient has no known allergies.     Medical History:   has a past medical history of Hypertension (7/9/2021).    Surgical History:   has a past surgical history that includes Hernia repair; Shoulder surgery (Right); Cervical fusion; and Transforaminal epidural injection of steroid (Right, 3/16/2022).    Family History:  family history is not on file.    Social History:   reports that he has never smoked. He has never used smokeless tobacco. He reports that he does not drink alcohol.    Physical Exam:  /78   Pulse 76   Wt 106.1 kg (233 lb 14.5 oz)   BMI 33.56 kg/m²   GEN: No acute distress. Calm, comfortable  HENT: Normocephalic, atraumatic, moist mucous membranes  EYE: Anicteric sclera, non-injected.   CV: Non-diaphoretic. Regular Rate. Radial Pulses 2+.  RESP: Breathing comfortably. Chest expansion symmetric.  EXT: No clubbing, cyanosis.   SKIN: Warm, & dry to palpation. No visible rashes or lesions of exposed skin.   PSYCH: Pleasant mood and appropriate affect. Recent and remote memory intact.   GAIT: Independent, normal ambulation  MSK:  No diffuse tenderness to palpation over muscular regions, but most pain over joints of hand and fingers and elbows.  Lumbar Spine Exam:       Inspection: No erythema, bruising.       Palpation: (+) TTP of lumbar paraspinals on the left and left SIJ       ROM:  Limited in flexion, extension, lateral bending.       (+) Facet loading bilaterally      (-) Straight Leg Raise bilaterally      (+) ISAIAS bilaterally  Hip Exam:      Inspection: No gross deformity or apparent leg length discrepancy      Palpation: No TTP to bilateral greater trochanteric bursas.       ROM: Limitation in  internal rotation bilaterally, but no pain  Neurologic Exam:     Alert. Speech is fluent and appropriate.     Strength:  5/5 throughout bilateral lower extremities     Sensation:  Grossly intact to light touch in bilateral lower extremities     Reflexes: 2+ in left and 1+ in right patella, and 1+ in b/l achilles     Tone: No abnormality appreciated in bilateral lower extremities     No Clonus     (-) Talbot bilaterally                Imaging:  - X-Ray Lumbar Spine Ap And Lateral 07/16/2021  Impression:  Mild spondylosis of the lumbar spine.    Labs:  BMP  Lab Results   Component Value Date     06/25/2021    K 4.9 06/25/2021     06/25/2021    CO2 25 06/25/2021    BUN 19 06/25/2021    CREATININE 1.4 06/25/2021    CALCIUM 9.6 06/25/2021    ANIONGAP 12 06/25/2021    ESTGFRAFRICA >60 06/25/2021    EGFRNONAA 52 (A) 06/25/2021     Lab Results   Component Value Date    ALT 38 01/11/2021    AST 21 01/11/2021    ALKPHOS 92 01/11/2021    BILITOT 0.3 01/11/2021     Lab Results   Component Value Date     01/11/2021     Lab Results   Component Value Date    CRP 1.2 07/28/2021     Lab Results   Component Value Date    SEDRATE 25 (H) 07/28/2021       Assessment:  Medhat Nunez is a 67 y.o. male with the following diagnoses based on history, exam, and imaging:    Problem List Items Addressed This Visit        Neuro    Lumbar radiculopathy      Other Visit Diagnoses     Posterior knee pain, right    -  Primary    Relevant Orders    X-Ray Knee 1 or 2 View Right    Polyarthralgia        DDD (degenerative disc disease), lumbar              This is a pleasant 67 y.o. gentleman presenting with:     - Chronic bilateral low back pain with right radicular pain and bilateral claudication symptoms:  Minimal findings on lumbar x-ray, but patient appears to right radicular pain and bilateral claudication symptoms which appears consistent with lumbar spinal stenosis.  - polyarthralgia/chronic widespread pain:  He was  previously diagnosed with fibromyalgia.  I am not sure that is the correct diagnosis at this time, but more likely represents diffuse osteoarthritis, or potentially rheumatoid arthritis. Inflammatory markers not significant previously  - Comorbidities: CKD. Depression and Anxiety on ROS.     03/07/2022- 66-year-old male with history of bilateral low back pain and bilateral leg pain with claudication symptoms.  He had minimal relief with physical therapy he was previously started on Cymbalta 30 mg with no relief of his right leg pain, did not report any adverse side effects with Cymbalta.  Discussed with patient I will schedule for a right lumbar SERA targeting L5-S1, and I will also increase his Cymbalta from 30 mg to 60. Patient verbalized understanding and agreed.      03/31/2022- 67-year-old male with a history of low back and right radicular pain and a previous history of bilateral claudication symptoms.  He reports today following a recent right TF SERA targeting L5 he denies any back pain, still reporting right lateral leg pain beginning just above his knee radiating into his calf area.  He does have a history of polyarthralgia and chronic widespread pain but again the only pain he complained about today was his right leg pain as mentioned previously.  Recommended we repeat the lumbar SERA but changing it to interlaminar.  Patient was agreeable to this plan.    Treatment Plan:   - PT/OT/HEP:  Continue  home exercise plan utilizing what  he learned from PT.   - Procedures: s/f  a lumbar SERA targeting L5-S1   - Medications:  Continue Cymbalta 60 mg daily  - Imaging:  Update right knee x-ray to rule out additional pathology patient also has an upcoming orthopedic appointment.  - Labs: Reviewed.  Medications are appropriately dosed for current hepatorenal function.    Greater than 50 minutes spent in total in todays visit with the patient, 25 minutes direct face to face counseling, performing PE, and educating  patient today. 25 minutes was used discussing the disease process, prognosis, treatment plan, risks and benefits.        Follow Up: RTC 2-3 weeks after injection    Discussed case with Dr. Malcolm today, he did recommend a lumbar SERA targeting L5-S1 and for patient to continue with home exercise plan.      Disclaimer: This note was partly generated using dictation software which may occasionally result in transcription errors.

## 2022-04-07 NOTE — DISCHARGE INSTRUCTIONS
Home Care Instructions Pain Management:    1.  DIET:    You may resume your normal diet today.    2.  BATHING:    You may shower with luke warm water.    3.  DRESSING:    You may remove your bandage today.    4.  ACTIVITY LEVEL:      You may resume your normal activities 24 hours after your procedure.    5.  MEDICATIONS:    You may resume your normal medications today.    6.  SPECIAL INSTRUCTIONS:    No heat to the injection site for 24 hours including bath or shower, heating pad, moist heat or hot tubs.    Use an ice pack to the injection site for any pain or discomfort.  Apply ice packs for 20 minute intervals as needed.    If you have received any sedatives by mouth today, you can not drive for 12 hours.    If you have received sedation through an IV, you can not drive for 24 hours.    PLEASE CALL YOUR DOCTOR FOR THE FOLLOWIN.  Redness or swelling around the injection site.  2.  Fever of 101 degrees.  3.  Drainage (pus) from the injection site.  4.  For any continuous bleeding (some dried blood over the incision is normal.)    FOR EMERGENCIES:    If any unusual problems or difficulties occur during clinic hours, call [phone] or dial [phone].    Follow up with with your physician in 2-3 weeks.

## 2022-04-11 ENCOUNTER — PATIENT MESSAGE (OUTPATIENT)
Dept: PAIN MEDICINE | Facility: CLINIC | Age: 67
End: 2022-04-11
Payer: MEDICARE

## 2022-04-12 ENCOUNTER — TELEPHONE (OUTPATIENT)
Dept: PAIN MEDICINE | Facility: CLINIC | Age: 67
End: 2022-04-12
Payer: MEDICARE

## 2022-04-13 ENCOUNTER — HOSPITAL ENCOUNTER (OUTPATIENT)
Facility: HOSPITAL | Age: 67
Discharge: HOME | End: 2022-04-13
Attending: PHYSICAL MEDICINE & REHABILITATION | Admitting: PHYSICAL MEDICINE & REHABILITATION
Payer: MEDICARE

## 2022-04-13 VITALS
HEART RATE: 87 BPM | SYSTOLIC BLOOD PRESSURE: 148 MMHG | WEIGHT: 210 LBS | HEIGHT: 70 IN | DIASTOLIC BLOOD PRESSURE: 87 MMHG | BODY MASS INDEX: 30.06 KG/M2 | TEMPERATURE: 98 F | OXYGEN SATURATION: 96 % | RESPIRATION RATE: 14 BRPM

## 2022-04-13 DIAGNOSIS — M54.16 LUMBAR RADICULOPATHY: Primary | ICD-10-CM

## 2022-04-13 DIAGNOSIS — R52 PAIN: ICD-10-CM

## 2022-04-13 PROCEDURE — 62323 NJX INTERLAMINAR LMBR/SAC: CPT | Mod: ,,, | Performed by: PHYSICAL MEDICINE & REHABILITATION

## 2022-04-13 PROCEDURE — 25000003 PHARM REV CODE 250: Performed by: PHYSICAL MEDICINE & REHABILITATION

## 2022-04-13 PROCEDURE — 62323 NJX INTERLAMINAR LMBR/SAC: CPT | Performed by: PHYSICAL MEDICINE & REHABILITATION

## 2022-04-13 PROCEDURE — 63600175 PHARM REV CODE 636 W HCPCS: Performed by: PHYSICAL MEDICINE & REHABILITATION

## 2022-04-13 PROCEDURE — 62323 PR INJ LUMBAR/SACRAL, W/IMAGING GUIDANCE: ICD-10-PCS | Mod: ,,, | Performed by: PHYSICAL MEDICINE & REHABILITATION

## 2022-04-13 PROCEDURE — 25500020 PHARM REV CODE 255: Performed by: PHYSICAL MEDICINE & REHABILITATION

## 2022-04-13 RX ORDER — DEXAMETHASONE SODIUM PHOSPHATE 4 MG/ML
INJECTION, SOLUTION INTRA-ARTICULAR; INTRALESIONAL; INTRAMUSCULAR; INTRAVENOUS; SOFT TISSUE
Status: DISCONTINUED | OUTPATIENT
Start: 2022-04-13 | End: 2022-04-13 | Stop reason: HOSPADM

## 2022-04-13 RX ORDER — INDOMETHACIN 25 MG/1
CAPSULE ORAL
Status: DISCONTINUED | OUTPATIENT
Start: 2022-04-13 | End: 2022-04-13 | Stop reason: HOSPADM

## 2022-04-13 RX ORDER — LIDOCAINE HYDROCHLORIDE 10 MG/ML
INJECTION, SOLUTION EPIDURAL; INFILTRATION; INTRACAUDAL; PERINEURAL
Status: DISCONTINUED | OUTPATIENT
Start: 2022-04-13 | End: 2022-04-13 | Stop reason: HOSPADM

## 2022-04-13 RX ORDER — LIDOCAINE HYDROCHLORIDE 10 MG/ML
INJECTION INFILTRATION; PERINEURAL
Status: DISCONTINUED | OUTPATIENT
Start: 2022-04-13 | End: 2022-04-13 | Stop reason: HOSPADM

## 2022-04-13 NOTE — INTERVAL H&P NOTE
HPI  Patient presenting for Procedure(s) (LRB):  Injection-steroid-epidural-lumbar L5-S1 (N/A)     Patient on Anti-coagulation No    No health changes since previous encounter. No changes in pain since procedure was scheduled at previous visit. Denies any fevers or infections.     No current facility-administered medications on file prior to encounter.     Current Outpatient Medications on File Prior to Encounter   Medication Sig Dispense Refill    diclofenac (VOLTAREN) 75 MG EC tablet Take 75 mg by mouth daily as needed.      DULoxetine (CYMBALTA) 30 MG capsule Take 2 capsules (60 mg total) by mouth once daily. 60 capsule 0    lisinopriL (PRINIVIL,ZESTRIL) 40 MG tablet Take 1 tablet (40 mg total) by mouth once daily. 90 tablet 3    methocarbamoL (ROBAXIN) 750 MG Tab Take 750 mg by mouth 2 (two) times daily as needed.      multivitamin capsule Take 1 capsule by mouth once daily.       Past Medical History:   Diagnosis Date    Hypertension 7/9/2021     Past Surgical History:   Procedure Laterality Date    CERVICAL FUSION      HERNIA REPAIR      SHOULDER SURGERY Right     TRANSFORAMINAL EPIDURAL INJECTION OF STEROID Right 3/16/2022    Procedure: Injection,steroid,epidural,transforaminal Right L5;  Surgeon: Colette Malcolm MD;  Location: [facility];  Service: Pain Management;  Laterality: Right;     Review of patient's allergies indicates:  No Known Allergies   No current facility-administered medications for this encounter.       PMHx, PSHx, Allergies, Medications reviewed in epic    ROS negative except pain complaints in HPI    OBJECTIVE:    There were no vitals taken for this visit.    PHYSICAL EXAMINATION:    GENERAL: Well appearing, in no acute distress, alert and oriented.  PSYCH:  Mood and affect appropriate.  SKIN: Skin color, texture, turgor normal in procedure area, no rashes or lesions which will impact the procedure.  CV: RRR with palpation of the radial artery.  PULM: No evidence of respiratory  difficulty, symmetric chest rise. Clear to auscultation.  NEURO: Alert. Oriented. Speech fluent and appropriate. Moving all extremities.    Plan:    Proceed with procedure as planned Procedure(s) (LRB):  Injection-steroid-epidural-lumbar L5-S1 (N/A)    Cooper Stokes  04/13/2022

## 2022-04-13 NOTE — DISCHARGE SUMMARY
[facility]  Discharge Note  Short Stay     Admit Date: 4/13/2022    Discharge Date: 4/13/2022     Attending Physician: Colette Maclolm M.D.    Diagnoses:  Active Hospital Problems    Diagnosis  POA    *Lumbar radiculopathy [M54.16]  Yes      Resolved Hospital Problems   No resolved problems to display.     Discharged Condition: Good     Hospital Course: Patient was admitted for an outpatient interventional pain management procedure and tolerated the procedure well with no complications.     Final Diagnoses: Same as principal problem.     Disposition: Home or Self Care     Follow up/Patient Instructions:   Follow-up in 1-2 weeks unless otherwise instructed. May return sooner as needed.       Reconciled Medications:     Medication List      CONTINUE taking these medications    diclofenac 75 MG EC tablet  Commonly known as: VOLTAREN  Take 75 mg by mouth daily as needed.     DULoxetine 30 MG capsule  Commonly known as: CYMBALTA  Take 2 capsules (60 mg total) by mouth once daily.     lisinopriL 40 MG tablet  Commonly known as: PRINIVIL,ZESTRIL  Take 1 tablet (40 mg total) by mouth once daily.     methocarbamoL 750 MG Tab  Commonly known as: ROBAXIN  Take 750 mg by mouth 2 (two) times daily as needed.     multivitamin capsule  Take 1 capsule by mouth once daily.           Discharge Procedure Orders (must include Diet, Follow-up, Activity)   Ice to affected area   Order Comments: 20 minutes of ice or until area numb to the touch if area is sore 2-3 times per day as needed     No driving until:   Order Comments: Until following day     No dressing needed     Notify your health care provider if you experience any of the following:  temperature >100.4     Notify your health care provider if you experience any of the following:  persistent nausea and vomiting or diarrhea     Notify your health care provider if you experience any of the following:  severe uncontrolled pain     Notify your health care provider if you  experience any of the following:  redness, tenderness, or signs of infection (pain, swelling, redness, odor or green/yellow discharge around incision site)     Notify your health care provider if you experience any of the following:  difficulty breathing or increased cough     Notify your health care provider if you experience any of the following:  severe persistent headache     Notify your health care provider if you experience any of the following:  worsening rash     Notify your health care provider if you experience any of the following:  persistent dizziness, light-headedness, or visual disturbances     Notify your health care provider if you experience any of the following:  increased confusion or weakness     Shower on day dressing removed (No bath)       Colette Malcolm M.D.  Interventional Pain Medicine / Physical Medicine & Rehabilitation

## 2022-04-13 NOTE — OP NOTE
Lumbar Interlaminar Epidural Steroid Injection Under Fluoroscopic Guidance:  I have reviewed the patient's medications, allergies and relevant histories prior to the procedure and no contraindications have been identified. The risks, benefits and alternatives to the procedure were discussed with the patient, and all questions regarding the procedure were answered to the patient's satisfaction. I personally obtained [employer]'s consent prior to the start of the procedure and the signed consent can be found in the patient's chart.                                                         Time-out was taken to identify patient, procedure, laterality, and allergies prior to starting the procedure.       Date of Service: 04/13/2022  Procedure: L5-S1 Interlaminar Epidural Steroid Injection under fluoroscopic guidance  Pre-Operative Diagnosis: Lumbar Radiculopathy  Post-Operative Diagnosis: Lumbar Radiculopathy    Physician: Colette Malcolm M.D.  Assistants: Cooper Stokes M.D.    Medications Injected: Preservative-free dexamethasone 10 mg/mL with 5 mL of sterile Xylocaine-MPF 1%.  Local Anesthetic: Xylocaine 1% 10 mL with Sodium Bicarbonate 1ml.   Sedation Medications: None.    Procedural Technique:   Laying in a prone position, the patient was prepped and draped in the usual sterile fashion using ChloraPrep and fenestrated drape.  Continuous hemodynamic monitoring was initiated including blood pressure, EKG, and pulse oximetry.  The interlaminar area of interest was determined under fluoroscopic guidance.  Local anesthetic was utilized to anesthetize the skin and subcutaneous tissues in the area using a 25-gauge needle.  A 3.5 inch 18-gauge Touhy needle was introduced under fluoroscopic guidance.  It met the inferior lamina. The needle was then hinged superiorly above the lamina.  Loss of resistance technique was employed while advancing the needle.  Once in the desired position, 2-3 mL of contrast, Omnipaque, was injected to  confirm epidural placement and that there was no vascular runoff. The medication was then injected slowly.  The needle was removed and bandage applied to the area.    Estimated Blood Loss:  None.  Complications:  None.     Disposition: The patient tolerated the procedure well, and there were no apparent complications. Vital signs remained stable throughout the procedure. The patient was taken to the recovery area and monitored. The patient was supplied with written discharge instructions for the procedure. If helpful, we can repeat as needed. The patient was discharged in a stable condition.    Follow-Up: We will see the patient back 2-4 weeks.

## 2022-04-13 NOTE — PLAN OF CARE
Pt denies pain or discomfort @ this time. Pt states he is ready to be discharged home @ this time.  Discharge instructions reviewed with patient, with time allotted for questions. Pt verbalizes understanding of instructions and states they have no further questions @ this time. Procedure site is clean, dry and intact. Band-aids in place. Vital signs are stable. No acute distress noted. Staff is at bedside to assist patient. Wheeled to discharge area. Home with family in private vehicle.

## 2022-04-13 NOTE — PLAN OF CARE
Bandaid applied to injection site, dry and intact. No active bleeding or hematoma present. Patient remained stable and without complaint throughout this procedure.

## 2022-04-14 ENCOUNTER — TELEPHONE (OUTPATIENT)
Dept: PAIN MEDICINE | Facility: CLINIC | Age: 67
End: 2022-04-14
Payer: MEDICARE

## 2022-04-14 NOTE — TELEPHONE ENCOUNTER
I informed pt's daughter Gloria Dr. Malcolm would like to cancel pt's knee injection due to having an SERA yesterday. I informed Gloria to keep f/u appt with rCispin in May. Gloria voiced understanding.

## 2022-04-14 NOTE — TELEPHONE ENCOUNTER
----- Message from Colette Malcolm MD sent at 4/14/2022 10:38 AM CDT -----  This gentleman is scheduled with me next week for knee injection, but lets take him off of my schedule and put him for SERA f/u 2-3 weeks after the SERA yesterday with Crispin. I talked to Crispin about it today.    Thanks,  KP

## 2022-06-05 ENCOUNTER — CLINICAL SUPPORT (OUTPATIENT)
Dept: URGENT CARE | Facility: CLINIC | Age: 67
End: 2022-06-05
Payer: MEDICARE

## 2022-06-05 DIAGNOSIS — Z20.822 ENCOUNTER FOR LABORATORY TESTING FOR COVID-19 VIRUS: ICD-10-CM

## 2022-06-05 PROCEDURE — U0003 INFECTIOUS AGENT DETECTION BY NUCLEIC ACID (DNA OR RNA); SEVERE ACUTE RESPIRATORY SYNDROME CORONAVIRUS 2 (SARS-COV-2) (CORONAVIRUS DISEASE [COVID-19]), AMPLIFIED PROBE TECHNIQUE, MAKING USE OF HIGH THROUGHPUT TECHNOLOGIES AS DESCRIBED BY CMS-2020-01-R: HCPCS

## 2022-06-05 PROCEDURE — U0005 INFEC AGEN DETEC AMPLI PROBE: HCPCS

## 2022-06-06 ENCOUNTER — TELEPHONE (OUTPATIENT)
Dept: URGENT CARE | Facility: CLINIC | Age: 67
End: 2022-06-06
Payer: MEDICARE

## 2022-06-06 LAB — SARS-COV-2 RNA RESP QL NAA+PROBE: NOT DETECTED

## 2022-07-14 ENCOUNTER — LAB VISIT (OUTPATIENT)
Dept: LAB | Facility: HOSPITAL | Age: 67
End: 2022-07-14
Attending: FAMILY MEDICINE
Payer: MEDICARE

## 2022-07-14 DIAGNOSIS — N18.31 CHRONIC KIDNEY DISEASE, STAGE 3A: ICD-10-CM

## 2022-07-14 DIAGNOSIS — R73.01 IFG (IMPAIRED FASTING GLUCOSE): ICD-10-CM

## 2022-07-14 LAB
ANION GAP SERPL CALC-SCNC: 11 MMOL/L (ref 8–16)
BUN SERPL-MCNC: 21 MG/DL (ref 8–23)
CALCIUM SERPL-MCNC: 9.9 MG/DL (ref 8.7–10.5)
CHLORIDE SERPL-SCNC: 103 MMOL/L (ref 95–110)
CO2 SERPL-SCNC: 26 MMOL/L (ref 23–29)
CREAT SERPL-MCNC: 1.2 MG/DL (ref 0.5–1.4)
EST. GFR  (AFRICAN AMERICAN): >60 ML/MIN/1.73 M^2
EST. GFR  (NON AFRICAN AMERICAN): >60 ML/MIN/1.73 M^2
ESTIMATED AVG GLUCOSE: 148 MG/DL (ref 68–131)
GLUCOSE SERPL-MCNC: 120 MG/DL (ref 70–110)
HBA1C MFR BLD: 6.8 % (ref 4–5.6)
POTASSIUM SERPL-SCNC: 4.8 MMOL/L (ref 3.5–5.1)
PTH-INTACT SERPL-MCNC: 57.3 PG/ML (ref 9–77)
SODIUM SERPL-SCNC: 140 MMOL/L (ref 136–145)

## 2022-07-14 PROCEDURE — 83970 ASSAY OF PARATHORMONE: CPT | Performed by: FAMILY MEDICINE

## 2022-07-14 PROCEDURE — 83036 HEMOGLOBIN GLYCOSYLATED A1C: CPT | Performed by: FAMILY MEDICINE

## 2022-07-14 PROCEDURE — 80048 BASIC METABOLIC PNL TOTAL CA: CPT | Performed by: FAMILY MEDICINE

## 2022-07-14 PROCEDURE — 36415 COLL VENOUS BLD VENIPUNCTURE: CPT | Performed by: FAMILY MEDICINE

## 2022-07-26 PROBLEM — R29.3 POOR POSTURE: Status: RESOLVED | Noted: 2022-01-27 | Resolved: 2022-07-26

## 2022-07-26 PROBLEM — R73.01 IFG (IMPAIRED FASTING GLUCOSE): Status: ACTIVE | Noted: 2022-07-26

## 2022-07-26 PROBLEM — M25.619 DECREASED RANGE OF MOTION (ROM) OF SHOULDER: Status: RESOLVED | Noted: 2022-01-27 | Resolved: 2022-07-26

## 2022-07-27 PROBLEM — N18.31 CHRONIC KIDNEY DISEASE, STAGE 3A: Status: RESOLVED | Noted: 2022-01-09 | Resolved: 2022-07-27

## 2022-08-29 PROBLEM — M17.11 PRIMARY OSTEOARTHRITIS OF RIGHT KNEE: Status: ACTIVE | Noted: 2022-08-29

## 2024-04-09 DIAGNOSIS — M19.90 OSTEOARTHRITIS: ICD-10-CM

## 2024-04-09 DIAGNOSIS — M25.569 KNEE PAIN: ICD-10-CM

## 2024-04-09 DIAGNOSIS — H81.10 BPPV (BENIGN PAROXYSMAL POSITIONAL VERTIGO): Primary | ICD-10-CM

## 2024-06-19 ENCOUNTER — PATIENT MESSAGE (OUTPATIENT)
Dept: NEUROLOGY | Facility: CLINIC | Age: 69
End: 2024-06-19
Payer: MEDICARE

## (undated) DEVICE — DRESSING LEUKOPLAST FLEX 1X3IN